# Patient Record
Sex: FEMALE | Race: WHITE | NOT HISPANIC OR LATINO | Employment: FULL TIME | URBAN - METROPOLITAN AREA
[De-identification: names, ages, dates, MRNs, and addresses within clinical notes are randomized per-mention and may not be internally consistent; named-entity substitution may affect disease eponyms.]

---

## 2017-03-07 PROBLEM — I50.22 CHRONIC SYSTOLIC CONGESTIVE HEART FAILURE: Status: ACTIVE | Noted: 2017-03-07

## 2017-03-15 ENCOUNTER — TELEPHONE (OUTPATIENT)
Dept: VASCULAR SURGERY | Facility: CLINIC | Age: 42
End: 2017-03-15

## 2017-03-15 NOTE — TELEPHONE ENCOUNTER
----- Message from Simeon MILTON Richter sent at 3/14/2017 12:01 PM CDT -----  Contact: Ingrid/Dr. Limon's office  Ingrid called in regarding the attached patient and wanted to see if Dr. Spencer would accept the attached as a referral.  Patient would be new patient to [facility] and has Medicaid.    Ingrid's call back number is [phone]

## 2017-03-24 ENCOUNTER — PATIENT MESSAGE (OUTPATIENT)
Dept: CARDIOLOGY | Facility: CLINIC | Age: 42
End: 2017-03-24

## 2020-08-08 ENCOUNTER — OFFICE VISIT (OUTPATIENT)
Dept: URGENT CARE | Facility: CLINIC | Age: 45
End: 2020-08-08
Payer: MEDICAID

## 2020-08-08 VITALS
WEIGHT: 197 LBS | OXYGEN SATURATION: 97 % | TEMPERATURE: 97 F | DIASTOLIC BLOOD PRESSURE: 90 MMHG | HEIGHT: 65 IN | SYSTOLIC BLOOD PRESSURE: 153 MMHG | BODY MASS INDEX: 32.82 KG/M2 | HEART RATE: 67 BPM

## 2020-08-08 DIAGNOSIS — R05.9 COUGH: ICD-10-CM

## 2020-08-08 DIAGNOSIS — R06.02 SHORTNESS OF BREATH: ICD-10-CM

## 2020-08-08 DIAGNOSIS — Z20.822 CLOSE EXPOSURE TO COVID-19 VIRUS: Primary | ICD-10-CM

## 2020-08-08 DIAGNOSIS — R51.9 HEAD ACHE: ICD-10-CM

## 2020-08-08 DIAGNOSIS — R51.9 INTRACTABLE HEADACHE, UNSPECIFIED CHRONICITY PATTERN, UNSPECIFIED HEADACHE TYPE: ICD-10-CM

## 2020-08-08 DIAGNOSIS — R06.02 SOB (SHORTNESS OF BREATH): ICD-10-CM

## 2020-08-08 DIAGNOSIS — J02.9 SORE THROAT: ICD-10-CM

## 2020-08-08 DIAGNOSIS — Z03.818 ENCOUNTER FOR OBSERVATION FOR SUSPECTED EXPOSURE TO OTHER BIOLOGICAL AGENTS RULED OUT: ICD-10-CM

## 2020-08-08 PROCEDURE — U0003 INFECTIOUS AGENT DETECTION BY NUCLEIC ACID (DNA OR RNA); SEVERE ACUTE RESPIRATORY SYNDROME CORONAVIRUS 2 (SARS-COV-2) (CORONAVIRUS DISEASE [COVID-19]), AMPLIFIED PROBE TECHNIQUE, MAKING USE OF HIGH THROUGHPUT TECHNOLOGIES AS DESCRIBED BY CMS-2020-01-R: HCPCS

## 2020-08-08 PROCEDURE — 99203 OFFICE O/P NEW LOW 30 MIN: CPT | Mod: S$GLB,,, | Performed by: NURSE PRACTITIONER

## 2020-08-08 PROCEDURE — 99203 PR OFFICE/OUTPT VISIT, NEW, LEVL III, 30-44 MIN: ICD-10-PCS | Mod: S$GLB,,, | Performed by: NURSE PRACTITIONER

## 2020-08-08 RX ORDER — PROMETHAZINE HYDROCHLORIDE AND DEXTROMETHORPHAN HYDROBROMIDE 6.25; 15 MG/5ML; MG/5ML
5 SYRUP ORAL NIGHTLY PRN
Qty: 118 ML | Refills: 0 | Status: SHIPPED | OUTPATIENT
Start: 2020-08-08 | End: 2020-08-18

## 2020-08-08 RX ORDER — ALBUTEROL SULFATE 90 UG/1
2 AEROSOL, METERED RESPIRATORY (INHALATION) EVERY 6 HOURS PRN
Qty: 18 G | Refills: 0 | Status: SHIPPED | OUTPATIENT
Start: 2020-08-08 | End: 2022-03-18

## 2020-08-08 RX ORDER — FLUTICASONE PROPIONATE 50 MCG
1 SPRAY, SUSPENSION (ML) NASAL DAILY
Qty: 16 G | Refills: 0 | Status: ON HOLD | OUTPATIENT
Start: 2020-08-08 | End: 2022-03-18 | Stop reason: HOSPADM

## 2020-08-08 NOTE — PATIENT INSTRUCTIONS
Follow up with your doctor in a few days.  Return to the urgent care or go to the ER if symptoms get worse.    flonase daily.  Daily antihistamine (claritin, allergra, or zyrtec)  Hot steam showers, vicks vapor rub, hot tea.  Albuterol every 4-6 hours for shortness of breath.    Promethazine DM for cough- will make you drowsy.     Follow up with primary care this week.  Self quarantine until test results back and follow cdc guidelines    We will call with results or they will be released to your patient portal.

## 2020-08-08 NOTE — PROGRESS NOTES
"Subjective:       Patient ID: Samraia Verma is a 44 y.o. female.    Vitals:  height is 5' 5" (1.651 m) and weight is 89.4 kg (197 lb). Her temperature is 97.2 °F (36.2 °C). Her blood pressure is 153/90 (abnormal) and her pulse is 67. Her oxygen saturation is 97%.     Chief Complaint: Sore Throat    Patient presents to clinic today for sore throat, headache, shortness of breath that started on Tuesday.(5 days) Patient's son tested positive on Tuesday for COVID.   Smh: htn, dm2, chf.  started with symptoms 1-2 days ago. No hx of asthma, chronic lung disease.     Other  This is a new problem. The current episode started in the past 7 days. The problem occurs constantly. The problem has been unchanged. Associated symptoms include congestion, coughing, fatigue, headaches and a sore throat. Pertinent negatives include no arthralgias, chest pain, chills, fever, joint swelling, myalgias, nausea, rash, vertigo, vomiting or weakness. She has tried nothing for the symptoms. The treatment provided no relief.       Constitution: Positive for fatigue. Negative for chills and fever.   HENT: Positive for congestion and sore throat.    Neck: Negative for painful lymph nodes.   Cardiovascular: Negative for chest pain and leg swelling.   Eyes: Negative for double vision and blurred vision.   Respiratory: Positive for cough. Negative for shortness of breath.    Gastrointestinal: Negative for nausea, vomiting and diarrhea.   Genitourinary: Negative for dysuria, frequency, urgency and history of kidney stones.   Musculoskeletal: Negative for joint pain, joint swelling, muscle cramps and muscle ache.   Skin: Negative for color change, pale, rash and bruising.   Allergic/Immunologic: Negative for seasonal allergies.   Neurological: Positive for headaches. Negative for dizziness, history of vertigo, light-headedness and passing out.   Hematologic/Lymphatic: Negative for swollen lymph nodes.   Psychiatric/Behavioral: Negative for " nervous/anxious, sleep disturbance and depression. The patient is not nervous/anxious.        Objective:      Physical Exam   Constitutional:  Non-toxic appearance. She does not appear ill. No distress.   HENT:   Mouth/Throat: Posterior oropharyngeal erythema present. No oropharyngeal exudate or posterior oropharyngeal edema.   Pulmonary/Chest: No stridor. No respiratory distress. She has decreased breath sounds (decreased air movement). She has no wheezes. She has no rhonchi.   Dry cough with deep breath    Comments: Dry cough with deep breath    Skin: Skin is not diaphoretic. Psychiatric: Her speech is normal and behavior is normal. Mood normal.       Patient was seen limited PE due to state of emergency for the COVID-19 outbreak.    Assessment:       1. Close Exposure to Covid-19 Virus    2. SOB (shortness of breath)    3. Intractable headache, unspecified chronicity pattern, unspecified headache type    4. Cough    5. Sore throat    6. Shortness of breath    7. Head ache    8. Encounter for observation for suspected exposure to other biological agents ruled out        Plan:     discussed meets criteria for covid 19 testing- . Advised to self quarantine until results and based on cdc guidelines if symptomatic.  Close follow up with pcp  Antihistamine, flonase, albuterol, rest/hydrate.     Close Exposure to Covid-19 Virus    SOB (shortness of breath)  -     albuterol (PROVENTIL/VENTOLIN HFA) 90 mcg/actuation inhaler; Inhale 2 puffs into the lungs every 6 (six) hours as needed. Rescue  Dispense: 18 g; Refill: 0    Intractable headache, unspecified chronicity pattern, unspecified headache type    Cough  -     albuterol (PROVENTIL/VENTOLIN HFA) 90 mcg/actuation inhaler; Inhale 2 puffs into the lungs every 6 (six) hours as needed. Rescue  Dispense: 18 g; Refill: 0  -     promethazine-dextromethorphan (PROMETHAZINE-DM) 6.25-15 mg/5 mL Syrp; Take 5 mLs by mouth nightly as needed.  Dispense: 118 mL; Refill: 0  -      fluticasone propionate (FLONASE) 50 mcg/actuation nasal spray; 1 spray (50 mcg total) by Each Nostril route once daily.  Dispense: 16 g; Refill: 0    Sore throat  -     fluticasone propionate (FLONASE) 50 mcg/actuation nasal spray; 1 spray (50 mcg total) by Each Nostril route once daily.  Dispense: 16 g; Refill: 0    Shortness of breath  -     COVID-19 Routine Screening    Head ache  -     COVID-19 Routine Screening    Encounter for observation for suspected exposure to other biological agents ruled out  -     COVID-19 Routine Screening      Patient Instructions   Follow up with your doctor in a few days.  Return to the urgent care or go to the ER if symptoms get worse.    flonase daily.  Daily antihistamine (claritin, allergra, or zyrtec)  Hot steam showers, vicks vapor rub, hot tea.  Albuterol every 4-6 hours for shortness of breath.    Promethazine DM for cough- will make you drowsy.     Follow up with primary care this week.  Self quarantine until test results back and follow cdc guidelines    We will call with results or they will be released to your patient portal.

## 2020-08-09 ENCOUNTER — TELEPHONE (OUTPATIENT)
Dept: URGENT CARE | Facility: CLINIC | Age: 45
End: 2020-08-09

## 2020-08-09 LAB — SARS-COV-2 RNA RESP QL NAA+PROBE: DETECTED

## 2020-08-09 NOTE — TELEPHONE ENCOUNTER
covid 19 detected. Reviewed results. Discussed cdc guidelines/dept of state recommendations/guidelines.  Enrolled in home monitoring program.

## 2021-04-15 ENCOUNTER — OFFICE VISIT (OUTPATIENT)
Dept: URGENT CARE | Facility: CLINIC | Age: 46
End: 2021-04-15
Payer: MEDICAID

## 2021-04-15 VITALS
TEMPERATURE: 98 F | WEIGHT: 197 LBS | BODY MASS INDEX: 32.78 KG/M2 | SYSTOLIC BLOOD PRESSURE: 125 MMHG | RESPIRATION RATE: 16 BRPM | OXYGEN SATURATION: 98 % | DIASTOLIC BLOOD PRESSURE: 70 MMHG | HEART RATE: 84 BPM

## 2021-04-15 DIAGNOSIS — J01.90 ACUTE SINUSITIS, RECURRENCE NOT SPECIFIED, UNSPECIFIED LOCATION: Primary | ICD-10-CM

## 2021-04-15 DIAGNOSIS — R05.9 COUGH: ICD-10-CM

## 2021-04-15 LAB
CTP QC/QA: YES
SARS-COV-2 RDRP RESP QL NAA+PROBE: NEGATIVE

## 2021-04-15 PROCEDURE — U0002: ICD-10-PCS | Mod: QW,S$GLB,, | Performed by: PHYSICIAN ASSISTANT

## 2021-04-15 PROCEDURE — 99214 PR OFFICE/OUTPT VISIT, EST, LEVL IV, 30-39 MIN: ICD-10-PCS | Mod: S$GLB,,, | Performed by: PHYSICIAN ASSISTANT

## 2021-04-15 PROCEDURE — 99214 OFFICE O/P EST MOD 30 MIN: CPT | Mod: S$GLB,,, | Performed by: PHYSICIAN ASSISTANT

## 2021-04-15 PROCEDURE — U0002 COVID-19 LAB TEST NON-CDC: HCPCS | Mod: QW,S$GLB,, | Performed by: PHYSICIAN ASSISTANT

## 2021-04-15 RX ORDER — AMOXICILLIN AND CLAVULANATE POTASSIUM 875; 125 MG/1; MG/1
1 TABLET, FILM COATED ORAL 2 TIMES DAILY
Qty: 20 TABLET | Refills: 0 | Status: SHIPPED | OUTPATIENT
Start: 2021-04-15 | End: 2021-04-25

## 2021-04-15 RX ORDER — ONDANSETRON 4 MG/1
4 TABLET, ORALLY DISINTEGRATING ORAL EVERY 8 HOURS PRN
Qty: 21 TABLET | Refills: 0 | Status: SHIPPED | OUTPATIENT
Start: 2021-04-15 | End: 2021-04-22

## 2021-04-15 RX ORDER — AZELASTINE 1 MG/ML
1 SPRAY, METERED NASAL 2 TIMES DAILY
Qty: 30 ML | Refills: 0 | Status: SHIPPED | OUTPATIENT
Start: 2021-04-15 | End: 2022-03-18

## 2021-04-15 RX ORDER — PROMETHAZINE HYDROCHLORIDE AND DEXTROMETHORPHAN HYDROBROMIDE 6.25; 15 MG/5ML; MG/5ML
5 SYRUP ORAL
Qty: 180 ML | Refills: 0 | Status: SHIPPED | OUTPATIENT
Start: 2021-04-15 | End: 2021-04-22

## 2021-04-15 RX ORDER — BENZONATATE 100 MG/1
100 CAPSULE ORAL EVERY 6 HOURS PRN
Qty: 28 CAPSULE | Refills: 0 | Status: SHIPPED | OUTPATIENT
Start: 2021-04-15 | End: 2021-04-22

## 2021-04-15 RX ORDER — FLUTICASONE PROPIONATE 50 MCG
1 SPRAY, SUSPENSION (ML) NASAL DAILY
Qty: 16 G | Refills: 0 | Status: SHIPPED | OUTPATIENT
Start: 2021-04-15 | End: 2022-03-28

## 2021-05-06 ENCOUNTER — PATIENT MESSAGE (OUTPATIENT)
Dept: RESEARCH | Facility: HOSPITAL | Age: 46
End: 2021-05-06

## 2022-03-15 ENCOUNTER — HOSPITAL ENCOUNTER (INPATIENT)
Facility: HOSPITAL | Age: 47
LOS: 2 days | Discharge: HOME | DRG: 638 | End: 2022-03-18
Attending: EMERGENCY MEDICINE | Admitting: STUDENT IN AN ORGANIZED HEALTH CARE EDUCATION/TRAINING PROGRAM
Payer: MEDICARE

## 2022-03-15 DIAGNOSIS — R73.9 HYPERGLYCEMIA: Primary | ICD-10-CM

## 2022-03-15 DIAGNOSIS — R07.89 CHEST DISCOMFORT: ICD-10-CM

## 2022-03-15 DIAGNOSIS — R20.0 NUMBNESS AND TINGLING OF LEFT SIDE OF FACE: ICD-10-CM

## 2022-03-15 DIAGNOSIS — I50.22 CHRONIC SYSTOLIC CONGESTIVE HEART FAILURE: ICD-10-CM

## 2022-03-15 DIAGNOSIS — E11.69 DIABETES MELLITUS TYPE 2 IN OBESE: ICD-10-CM

## 2022-03-15 DIAGNOSIS — R07.9 CHEST PAIN: ICD-10-CM

## 2022-03-15 DIAGNOSIS — R20.2 PARESTHESIA OF LEFT ARM AND LEG: ICD-10-CM

## 2022-03-15 DIAGNOSIS — I42.8 NICM (NONISCHEMIC CARDIOMYOPATHY): ICD-10-CM

## 2022-03-15 DIAGNOSIS — H53.8 BLURRY VISION, LEFT EYE: ICD-10-CM

## 2022-03-15 DIAGNOSIS — I50.9 CHF (CONGESTIVE HEART FAILURE): ICD-10-CM

## 2022-03-15 DIAGNOSIS — H53.2 DIPLOPIA: ICD-10-CM

## 2022-03-15 DIAGNOSIS — R20.2 NUMBNESS AND TINGLING OF LEFT SIDE OF FACE: ICD-10-CM

## 2022-03-15 DIAGNOSIS — E11.00 HYPEROSMOLAR HYPERGLYCEMIC STATE (HHS): ICD-10-CM

## 2022-03-15 DIAGNOSIS — E66.9 DIABETES MELLITUS TYPE 2 IN OBESE: ICD-10-CM

## 2022-03-15 PROBLEM — E78.2 MIXED HYPERLIPIDEMIA: Status: ACTIVE | Noted: 2022-03-15

## 2022-03-15 PROBLEM — E87.1 HYPONATREMIA: Status: ACTIVE | Noted: 2022-03-15

## 2022-03-15 PROBLEM — E66.811 CLASS 1 OBESITY IN ADULT: Status: ACTIVE | Noted: 2022-03-15

## 2022-03-15 LAB
ALLENS TEST: ABNORMAL
ANION GAP SERPL CALC-SCNC: 15 MMOL/L (ref 8–16)
ANION GAP SERPL CALC-SCNC: 18 MMOL/L (ref 8–16)
B-OH-BUTYR BLD STRIP-SCNC: 0 MMOL/L (ref 0–0.5)
BACTERIA #/AREA URNS AUTO: NORMAL /HPF
BASOPHILS # BLD AUTO: 0.03 K/UL (ref 0–0.2)
BASOPHILS NFR BLD: 0.3 % (ref 0–1.9)
BILIRUB UR QL STRIP: NEGATIVE
BNP SERPL-MCNC: <10 PG/ML (ref 0–99)
BUN SERPL-MCNC: 6 MG/DL (ref 6–30)
BUN SERPL-MCNC: 6 MG/DL (ref 6–30)
BUN SERPL-MCNC: 7 MG/DL (ref 6–20)
BUN SERPL-MCNC: 7 MG/DL (ref 6–20)
CALCIUM SERPL-MCNC: 10 MG/DL (ref 8.7–10.5)
CALCIUM SERPL-MCNC: 9.9 MG/DL (ref 8.7–10.5)
CHLORIDE SERPL-SCNC: 93 MMOL/L (ref 95–110)
CHLORIDE SERPL-SCNC: 95 MMOL/L (ref 95–110)
CHLORIDE SERPL-SCNC: 96 MMOL/L (ref 95–110)
CHLORIDE SERPL-SCNC: 98 MMOL/L (ref 95–110)
CLARITY UR REFRACT.AUTO: CLEAR
CO2 SERPL-SCNC: 22 MMOL/L (ref 23–29)
CO2 SERPL-SCNC: 22 MMOL/L (ref 23–29)
COLOR UR AUTO: COLORLESS
CREAT SERPL-MCNC: 0.5 MG/DL (ref 0.5–1.4)
CREAT SERPL-MCNC: 0.5 MG/DL (ref 0.5–1.4)
CREAT SERPL-MCNC: 0.6 MG/DL (ref 0.5–1.4)
CREAT SERPL-MCNC: 0.7 MG/DL (ref 0.5–1.4)
CREAT SERPL-MCNC: 0.8 MG/DL (ref 0.5–1.4)
CTP QC/QA: YES
DELSYS: ABNORMAL
DIFFERENTIAL METHOD: ABNORMAL
EOSINOPHIL # BLD AUTO: 0.1 K/UL (ref 0–0.5)
EOSINOPHIL NFR BLD: 1.3 % (ref 0–8)
ERYTHROCYTE [DISTWIDTH] IN BLOOD BY AUTOMATED COUNT: 14.1 % (ref 11.5–14.5)
EST. GFR  (AFRICAN AMERICAN): >60 ML/MIN/1.73 M^2
EST. GFR  (AFRICAN AMERICAN): >60 ML/MIN/1.73 M^2
EST. GFR  (NON AFRICAN AMERICAN): >60 ML/MIN/1.73 M^2
EST. GFR  (NON AFRICAN AMERICAN): >60 ML/MIN/1.73 M^2
ESTIMATED AVG GLUCOSE: ABNORMAL MG/DL (ref 68–131)
GLUCOSE SERPL-MCNC: 315 MG/DL (ref 70–110)
GLUCOSE SERPL-MCNC: 460 MG/DL (ref 70–110)
GLUCOSE SERPL-MCNC: 508 MG/DL (ref 70–110)
GLUCOSE SERPL-MCNC: 646 MG/DL (ref 70–110)
GLUCOSE UR QL STRIP: ABNORMAL
HBA1C MFR BLD: >14 % (ref 4–5.6)
HCO3 UR-SCNC: 27.8 MMOL/L (ref 24–28)
HCT VFR BLD AUTO: 42.3 % (ref 37–48.5)
HCT VFR BLD CALC: 42 %PCV (ref 36–54)
HCT VFR BLD CALC: 44 %PCV (ref 36–54)
HGB BLD-MCNC: 12.7 G/DL (ref 12–16)
HGB UR QL STRIP: NEGATIVE
IMM GRANULOCYTES # BLD AUTO: 0.05 K/UL (ref 0–0.04)
IMM GRANULOCYTES NFR BLD AUTO: 0.5 % (ref 0–0.5)
KETONES UR QL STRIP: NEGATIVE
LEUKOCYTE ESTERASE UR QL STRIP: ABNORMAL
LYMPHOCYTES # BLD AUTO: 3.3 K/UL (ref 1–4.8)
LYMPHOCYTES NFR BLD: 31.8 % (ref 18–48)
MAGNESIUM SERPL-MCNC: 1.7 MG/DL (ref 1.6–2.6)
MCH RBC QN AUTO: 25.2 PG (ref 27–31)
MCHC RBC AUTO-ENTMCNC: 30 G/DL (ref 32–36)
MCV RBC AUTO: 84 FL (ref 82–98)
MICROSCOPIC COMMENT: NORMAL
MODE: ABNORMAL
MONOCYTES # BLD AUTO: 0.5 K/UL (ref 0.3–1)
MONOCYTES NFR BLD: 4.7 % (ref 4–15)
NEUTROPHILS # BLD AUTO: 6.4 K/UL (ref 1.8–7.7)
NEUTROPHILS NFR BLD: 61.4 % (ref 38–73)
NITRITE UR QL STRIP: NEGATIVE
NRBC BLD-RTO: 0 /100 WBC
OSMOLALITY SERPL: 312 MOSM/KG (ref 275–295)
PCO2 BLDA: 45.8 MMHG (ref 35–45)
PH SMN: 7.39 [PH] (ref 7.35–7.45)
PH UR STRIP: 6 [PH] (ref 5–8)
PHOSPHATE SERPL-MCNC: 3.2 MG/DL (ref 2.7–4.5)
PHOSPHATE SERPL-MCNC: 3.5 MG/DL (ref 2.7–4.5)
PLATELET # BLD AUTO: 287 K/UL (ref 150–450)
PMV BLD AUTO: 10.5 FL (ref 9.2–12.9)
PO2 BLDA: 37 MMHG (ref 40–60)
POC BE: 3 MMOL/L
POC IONIZED CALCIUM: 1.19 MMOL/L (ref 1.06–1.42)
POC IONIZED CALCIUM: 1.21 MMOL/L (ref 1.06–1.42)
POC PTINR: 1 (ref 0.9–1.2)
POC PTWBT: 12.1 SEC (ref 9.7–14.3)
POC SATURATED O2: 70 % (ref 95–100)
POC TCO2 (MEASURED): 22 MMOL/L (ref 23–29)
POC TCO2 (MEASURED): 26 MMOL/L (ref 23–29)
POC TCO2: 29 MMOL/L (ref 24–29)
POCT GLUCOSE: 283 MG/DL (ref 70–110)
POCT GLUCOSE: 288 MG/DL (ref 70–110)
POCT GLUCOSE: 301 MG/DL (ref 70–110)
POCT GLUCOSE: 314 MG/DL (ref 70–110)
POCT GLUCOSE: >500 MG/DL (ref 70–110)
POTASSIUM BLD-SCNC: 4.4 MMOL/L (ref 3.5–5.1)
POTASSIUM BLD-SCNC: 4.6 MMOL/L (ref 3.5–5.1)
POTASSIUM SERPL-SCNC: 3.9 MMOL/L (ref 3.5–5.1)
POTASSIUM SERPL-SCNC: 4.3 MMOL/L (ref 3.5–5.1)
PROT UR QL STRIP: NEGATIVE
RBC # BLD AUTO: 5.04 M/UL (ref 4–5.4)
RBC #/AREA URNS AUTO: 2 /HPF (ref 0–4)
SAMPLE: ABNORMAL
SAMPLE: NORMAL
SAMPLE: NORMAL
SARS-COV-2 RDRP RESP QL NAA+PROBE: NEGATIVE
SITE: ABNORMAL
SODIUM BLD-SCNC: 131 MMOL/L (ref 136–145)
SODIUM BLD-SCNC: 133 MMOL/L (ref 136–145)
SODIUM SERPL-SCNC: 133 MMOL/L (ref 136–145)
SODIUM SERPL-SCNC: 135 MMOL/L (ref 136–145)
SP GR UR STRIP: >=1.03 (ref 1–1.03)
TROPONIN I SERPL DL<=0.01 NG/ML-MCNC: <0.006 NG/ML (ref 0–0.03)
URN SPEC COLLECT METH UR: ABNORMAL
WBC # BLD AUTO: 10.47 K/UL (ref 3.9–12.7)
WBC #/AREA URNS AUTO: 1 /HPF (ref 0–5)
YEAST UR QL AUTO: NORMAL

## 2022-03-15 PROCEDURE — 25000003 PHARM REV CODE 250

## 2022-03-15 PROCEDURE — 80048 BASIC METABOLIC PNL TOTAL CA: CPT

## 2022-03-15 PROCEDURE — 99220 PR INITIAL OBSERVATION CARE,LEVL III: CPT | Mod: ,,, | Performed by: FAMILY MEDICINE

## 2022-03-15 PROCEDURE — 93010 ELECTROCARDIOGRAM REPORT: CPT | Mod: 59,76,, | Performed by: INTERNAL MEDICINE

## 2022-03-15 PROCEDURE — 82565 ASSAY OF CREATININE: CPT

## 2022-03-15 PROCEDURE — 87389 HIV-1 AG W/HIV-1&-2 AB AG IA: CPT | Performed by: EMERGENCY MEDICINE

## 2022-03-15 PROCEDURE — 96366 THER/PROPH/DIAG IV INF ADDON: CPT

## 2022-03-15 PROCEDURE — 84484 ASSAY OF TROPONIN QUANT: CPT

## 2022-03-15 PROCEDURE — 25500020 PHARM REV CODE 255

## 2022-03-15 PROCEDURE — 93005 ELECTROCARDIOGRAM TRACING: CPT

## 2022-03-15 PROCEDURE — 99220 PR INITIAL OBSERVATION CARE,LEVL III: ICD-10-PCS | Mod: ,,, | Performed by: FAMILY MEDICINE

## 2022-03-15 PROCEDURE — 85610 PROTHROMBIN TIME: CPT

## 2022-03-15 PROCEDURE — 83036 HEMOGLOBIN GLYCOSYLATED A1C: CPT

## 2022-03-15 PROCEDURE — 93010 ELECTROCARDIOGRAM REPORT: CPT | Mod: ,,, | Performed by: INTERNAL MEDICINE

## 2022-03-15 PROCEDURE — 81001 URINALYSIS AUTO W/SCOPE: CPT

## 2022-03-15 PROCEDURE — 96365 THER/PROPH/DIAG IV INF INIT: CPT

## 2022-03-15 PROCEDURE — 93308 TTE F-UP OR LMTD: CPT | Mod: 26,,, | Performed by: EMERGENCY MEDICINE

## 2022-03-15 PROCEDURE — 63600175 PHARM REV CODE 636 W HCPCS: Performed by: FAMILY MEDICINE

## 2022-03-15 PROCEDURE — 96372 THER/PROPH/DIAG INJ SC/IM: CPT | Performed by: FAMILY MEDICINE

## 2022-03-15 PROCEDURE — G0378 HOSPITAL OBSERVATION PER HR: HCPCS

## 2022-03-15 PROCEDURE — 82010 KETONE BODYS QUAN: CPT

## 2022-03-15 PROCEDURE — 83735 ASSAY OF MAGNESIUM: CPT

## 2022-03-15 PROCEDURE — 99291 PR CRITICAL CARE, E/M 30-74 MINUTES: ICD-10-PCS | Mod: 25,CS,, | Performed by: EMERGENCY MEDICINE

## 2022-03-15 PROCEDURE — 85025 COMPLETE CBC W/AUTO DIFF WBC: CPT

## 2022-03-15 PROCEDURE — 86803 HEPATITIS C AB TEST: CPT | Performed by: EMERGENCY MEDICINE

## 2022-03-15 PROCEDURE — 82803 BLOOD GASES ANY COMBINATION: CPT

## 2022-03-15 PROCEDURE — 80047 BASIC METABLC PNL IONIZED CA: CPT

## 2022-03-15 PROCEDURE — 99900035 HC TECH TIME PER 15 MIN (STAT)

## 2022-03-15 PROCEDURE — 84100 ASSAY OF PHOSPHORUS: CPT | Mod: 91

## 2022-03-15 PROCEDURE — 83880 ASSAY OF NATRIURETIC PEPTIDE: CPT

## 2022-03-15 PROCEDURE — 82962 GLUCOSE BLOOD TEST: CPT

## 2022-03-15 PROCEDURE — 83930 ASSAY OF BLOOD OSMOLALITY: CPT

## 2022-03-15 PROCEDURE — 99291 CRITICAL CARE FIRST HOUR: CPT | Mod: 25,CS,, | Performed by: EMERGENCY MEDICINE

## 2022-03-15 PROCEDURE — 99223 PR INITIAL HOSPITAL CARE,LEVL III: ICD-10-PCS | Mod: ,,, | Performed by: PSYCHIATRY & NEUROLOGY

## 2022-03-15 PROCEDURE — 25000003 PHARM REV CODE 250: Performed by: FAMILY MEDICINE

## 2022-03-15 PROCEDURE — 93308 PR ECHO HEART XTHORACIC,LIMITED: ICD-10-PCS | Mod: 26,,, | Performed by: EMERGENCY MEDICINE

## 2022-03-15 PROCEDURE — U0002 COVID-19 LAB TEST NON-CDC: HCPCS | Performed by: EMERGENCY MEDICINE

## 2022-03-15 PROCEDURE — 99291 CRITICAL CARE FIRST HOUR: CPT | Mod: 25

## 2022-03-15 PROCEDURE — 99223 1ST HOSP IP/OBS HIGH 75: CPT | Mod: ,,, | Performed by: PSYCHIATRY & NEUROLOGY

## 2022-03-15 PROCEDURE — 93010 EKG 12-LEAD: ICD-10-PCS | Mod: ,,, | Performed by: INTERNAL MEDICINE

## 2022-03-15 RX ORDER — GLUCAGON 1 MG
1 KIT INJECTION
Status: DISCONTINUED | OUTPATIENT
Start: 2022-03-15 | End: 2022-03-18 | Stop reason: HOSPADM

## 2022-03-15 RX ORDER — NALOXONE HCL 0.4 MG/ML
0.02 VIAL (ML) INJECTION
Status: DISCONTINUED | OUTPATIENT
Start: 2022-03-15 | End: 2022-03-18 | Stop reason: HOSPADM

## 2022-03-15 RX ORDER — HEPARIN SODIUM 5000 [USP'U]/ML
5000 INJECTION, SOLUTION INTRAVENOUS; SUBCUTANEOUS EVERY 8 HOURS
Status: DISCONTINUED | OUTPATIENT
Start: 2022-03-15 | End: 2022-03-18 | Stop reason: HOSPADM

## 2022-03-15 RX ORDER — ACETAMINOPHEN 500 MG
1000 TABLET ORAL EVERY 8 HOURS PRN
Status: DISCONTINUED | OUTPATIENT
Start: 2022-03-15 | End: 2022-03-18 | Stop reason: HOSPADM

## 2022-03-15 RX ORDER — MAG HYDROX/ALUMINUM HYD/SIMETH 200-200-20
30 SUSPENSION, ORAL (FINAL DOSE FORM) ORAL 4 TIMES DAILY PRN
Status: DISCONTINUED | OUTPATIENT
Start: 2022-03-15 | End: 2022-03-18 | Stop reason: HOSPADM

## 2022-03-15 RX ORDER — SODIUM CHLORIDE 9 MG/ML
INJECTION, SOLUTION INTRAVENOUS CONTINUOUS
Status: DISCONTINUED | OUTPATIENT
Start: 2022-03-15 | End: 2022-03-15

## 2022-03-15 RX ORDER — SODIUM CHLORIDE 0.9 % (FLUSH) 0.9 %
10 SYRINGE (ML) INJECTION EVERY 12 HOURS PRN
Status: DISCONTINUED | OUTPATIENT
Start: 2022-03-15 | End: 2022-03-18 | Stop reason: HOSPADM

## 2022-03-15 RX ORDER — IBUPROFEN 200 MG
16 TABLET ORAL
Status: DISCONTINUED | OUTPATIENT
Start: 2022-03-15 | End: 2022-03-18 | Stop reason: HOSPADM

## 2022-03-15 RX ORDER — ATORVASTATIN CALCIUM 20 MG/1
40 TABLET, FILM COATED ORAL DAILY
Status: DISCONTINUED | OUTPATIENT
Start: 2022-03-16 | End: 2022-03-18 | Stop reason: HOSPADM

## 2022-03-15 RX ORDER — POLYETHYLENE GLYCOL 3350 17 G/17G
17 POWDER, FOR SOLUTION ORAL DAILY
Status: DISCONTINUED | OUTPATIENT
Start: 2022-03-16 | End: 2022-03-18 | Stop reason: HOSPADM

## 2022-03-15 RX ORDER — SODIUM CHLORIDE 9 MG/ML
INJECTION, SOLUTION INTRAVENOUS CONTINUOUS
Status: ACTIVE | OUTPATIENT
Start: 2022-03-15 | End: 2022-03-16

## 2022-03-15 RX ORDER — IPRATROPIUM BROMIDE AND ALBUTEROL SULFATE 2.5; .5 MG/3ML; MG/3ML
3 SOLUTION RESPIRATORY (INHALATION) EVERY 4 HOURS PRN
Status: DISCONTINUED | OUTPATIENT
Start: 2022-03-15 | End: 2022-03-18 | Stop reason: HOSPADM

## 2022-03-15 RX ORDER — HYDRALAZINE HYDROCHLORIDE 25 MG/1
25 TABLET, FILM COATED ORAL EVERY 8 HOURS PRN
Status: DISCONTINUED | OUTPATIENT
Start: 2022-03-15 | End: 2022-03-18 | Stop reason: HOSPADM

## 2022-03-15 RX ORDER — ONDANSETRON 2 MG/ML
4 INJECTION INTRAMUSCULAR; INTRAVENOUS EVERY 8 HOURS PRN
Status: DISCONTINUED | OUTPATIENT
Start: 2022-03-15 | End: 2022-03-18 | Stop reason: HOSPADM

## 2022-03-15 RX ORDER — CARVEDILOL 6.25 MG/1
6.25 TABLET ORAL 2 TIMES DAILY
Status: DISCONTINUED | OUTPATIENT
Start: 2022-03-15 | End: 2022-03-18 | Stop reason: HOSPADM

## 2022-03-15 RX ORDER — TALC
6 POWDER (GRAM) TOPICAL NIGHTLY PRN
Status: DISCONTINUED | OUTPATIENT
Start: 2022-03-15 | End: 2022-03-18 | Stop reason: HOSPADM

## 2022-03-15 RX ORDER — IBUPROFEN 200 MG
24 TABLET ORAL
Status: DISCONTINUED | OUTPATIENT
Start: 2022-03-15 | End: 2022-03-18 | Stop reason: HOSPADM

## 2022-03-15 RX ORDER — ASPIRIN 81 MG/1
81 TABLET ORAL DAILY
Status: DISCONTINUED | OUTPATIENT
Start: 2022-03-16 | End: 2022-03-18 | Stop reason: HOSPADM

## 2022-03-15 RX ADMIN — SODIUM CHLORIDE: 0.9 INJECTION, SOLUTION INTRAVENOUS at 09:03

## 2022-03-15 RX ADMIN — SODIUM CHLORIDE 250 ML: 0.9 INJECTION, SOLUTION INTRAVENOUS at 06:03

## 2022-03-15 RX ADMIN — IOHEXOL 100 ML: 350 INJECTION, SOLUTION INTRAVENOUS at 05:03

## 2022-03-15 RX ADMIN — SODIUM CHLORIDE: 0.9 INJECTION, SOLUTION INTRAVENOUS at 08:03

## 2022-03-15 RX ADMIN — HEPARIN SODIUM 5000 UNITS: 5000 INJECTION INTRAVENOUS; SUBCUTANEOUS at 10:03

## 2022-03-15 RX ADMIN — CARVEDILOL 6.25 MG: 6.25 TABLET, FILM COATED ORAL at 08:03

## 2022-03-15 RX ADMIN — INSULIN HUMAN 3 UNITS/HR: 1 INJECTION, SOLUTION INTRAVENOUS at 06:03

## 2022-03-15 NOTE — HPI
Ms. Verma is a 46 year old female with PMH of CHF (AICD), LBBB, cardiomyopathy, DM, and HTN who presented with c/o chest pain, blurry vision, left sided numbness, and double vision that started at 1530. Patient reports she was at home laying in bed when symptoms started. However reports blurry vision is not a new problem and that has been going on for a while. She has never seen anyone for this issue. She reports that she was unable to take home medications due to financial hardship and did not have a PCP. Stroke code was activated. On exam, patient with functional exam. Patient reported that she could not move left arm however on distraction patient moved hand was able to maintain arm above her head with out dropping it. Noted increased tone on the left arm, mild left facial weakness, and fluctuating visual fields defect. Patient was noted to be hyperglycemic with blood glucose >600 and hyponatremic.CTA MP negative for LVO, high grade stenosis, vascular malformation, and hemorrhage. Patient has a pacemaker in which she was told it was not MRI compatible.     Patient is not a candidate for TPA as evidence of stroke mimic with blood glucose >600 and functional exam.     Recommend repeat CTH in 24 hours to rule out stroke if patient is not able to get MRI.

## 2022-03-15 NOTE — ED PROVIDER NOTES
Encounter Date: 3/15/2022       History     Chief Complaint   Patient presents with    Chest Pain     Has aicd     Ms. Verma is a 46 year old female with history of DM2, HTN, CHF with IACD who presents with progressively worsening chest pain. Substernal in location and radiates to the right subcostal area. Chest pain is a 6/10 during exam. CP worsens with activity. She also notes DAVIS, SOB, chills, headache and weakness. Patient noted to have walking difficulty with double vision since arriving to Emergency Room. She reports chronic diarrhea for the past two years. She notes not having been taking medications due to financial difficulties and lack of PCP for over a year and half. She denies fever, n/v/c.        Review of patient's allergies indicates:   Allergen Reactions    Lisinopril Rash     Past Medical History:   Diagnosis Date    Anticoagulant long-term use     CHF (congestive heart failure)     Chronic systolic congestive heart failure 3/7/2017    Diabetes mellitus     Diabetes mellitus type 2 in obese 1/6/2016    Hypertension     Primary hypertension 1/6/2016     Past Surgical History:   Procedure Laterality Date    BREAST SURGERY Bilateral     reduction    CARDIAC DEFIBRILLATOR PLACEMENT  2018    INSERTION OF PACEMAKER  2018    TUBAL LIGATION       Family History   Problem Relation Age of Onset    Heart disease Mother     Arthritis Mother     Heart disease Father     Hypertension Father     Asthma Son     Heart disease Paternal Grandmother     Pulmonary embolism Unknown      Social History     Tobacco Use    Smoking status: Never Smoker    Smokeless tobacco: Never Used   Substance Use Topics    Alcohol use: No    Drug use: No     Review of Systems   Constitutional: Positive for chills. Negative for fever.   HENT: Negative for congestion and ear pain.    Eyes: Positive for visual disturbance (left eye blurry, double vision). Negative for pain.   Respiratory: Positive for shortness of  breath.    Cardiovascular: Positive for chest pain. Negative for palpitations.   Gastrointestinal: Positive for diarrhea. Negative for abdominal pain, constipation, nausea and vomiting.   Endocrine: Positive for polyuria.   Genitourinary: Positive for urgency. Negative for difficulty urinating, dysuria and hematuria.   Musculoskeletal: Negative for back pain and neck pain.   Skin: Negative for rash and wound.   Neurological: Positive for dizziness and headaches.   Psychiatric/Behavioral: Positive for sleep disturbance. Negative for agitation.       Physical Exam     Initial Vitals [03/15/22 1551]   BP Pulse Resp Temp SpO2   (!) 172/81 100 20 99.3 °F (37.4 °C) 97 %      MAP       --         Physical Exam    Nursing note and vitals reviewed.  Constitutional: She appears well-developed and well-nourished. She is diaphoretic. She appears distressed.   HENT:   Head: Normocephalic and atraumatic.   Right Ear: External ear normal.   Left Ear: External ear normal.   Eyes: EOM are normal. Pupils are equal, round, and reactive to light. Right eye exhibits no discharge. Left eye exhibits no discharge.   Neck: Neck supple. No JVD present.   Normal range of motion.  Cardiovascular: Normal rate and regular rhythm.   No murmur heard.  Pulmonary/Chest: Breath sounds normal. She has no wheezes.   Abdominal: Abdomen is soft. Bowel sounds are normal.   Musculoskeletal:         General: No tenderness or edema. Normal range of motion.      Cervical back: Normal range of motion and neck supple.     Neurological: She is alert and oriented to person, place, and time. She displays no atrophy. A cranial nerve deficit and sensory deficit is present. Coordination abnormal.   Reflex Scores:       Bicep reflexes are 2+ on the right side and 2+ on the left side.       Brachioradialis reflexes are 2+ on the right side and 2+ on the left side.       Patellar reflexes are 3+ on the right side and 3+ on the left side.  Left sided ataxia, Left facial  paresthesia, Left eye vision blurriness and double vision noted when both eyes open   Skin: Skin is warm. No rash noted.   Psychiatric: She has a normal mood and affect. Her behavior is normal. Judgment and thought content normal.         ED Course   Procedures  Labs Reviewed   CBC W/ AUTO DIFFERENTIAL - Abnormal; Notable for the following components:       Result Value    MCH 25.2 (*)     MCHC 30.0 (*)     Immature Grans (Abs) 0.05 (*)     All other components within normal limits   URINALYSIS, REFLEX TO URINE CULTURE - Abnormal; Notable for the following components:    Color, UA Colorless (*)     Specific Gravity, UA >=1.030 (*)     Glucose, UA 3+ (*)     Leukocytes, UA Trace (*)     All other components within normal limits    Narrative:     Specimen Source->Urine   HEMOGLOBIN A1C - Abnormal; Notable for the following components:    Hemoglobin A1C >14.0 (*)     All other components within normal limits    Narrative:     With next lab draw   BASIC METABOLIC PANEL - Abnormal; Notable for the following components:    Sodium 133 (*)     Chloride 93 (*)     CO2 22 (*)     Glucose 508 (*)     Anion Gap 18 (*)     All other components within normal limits    Narrative:     With next lab draw   POCT GLUCOSE - Abnormal; Notable for the following components:    POCT Glucose >500 (*)     All other components within normal limits   ISTAT PROCEDURE - Abnormal; Notable for the following components:    POC Glucose 646 (*)     POC Sodium 131 (*)     POC TCO2 (MEASURED) 22 (*)     All other components within normal limits   ISTAT PROCEDURE - Abnormal; Notable for the following components:    POC PCO2 45.8 (*)     POC PO2 37 (*)     POC SATURATED O2 70 (*)     All other components within normal limits   ISTAT PROCEDURE - Abnormal; Notable for the following components:    POC Glucose 460 (*)     POC Sodium 133 (*)     All other components within normal limits   BETA - HYDROXYBUTYRATE, SERUM   TROPONIN I   B-TYPE NATRIURETIC PEPTIDE    PHOSPHORUS    Narrative:     With next lab draw   MAGNESIUM    Narrative:     With next lab draw   URINALYSIS MICROSCOPIC    Narrative:     Specimen Source->Urine   HIV 1 / 2 ANTIBODY   HEPATITIS C ANTIBODY   BASIC METABOLIC PANEL   PHOSPHORUS   SARS-COV-2 RDRP GENE   ISTAT PROCEDURE   ISTAT CREATININE   POCT GLUCOSE MONITORING CONTINUOUS   POCT GLUCOSE MONITORING CONTINUOUS     EKG Readings: (Independently Interpreted)   Initial Reading: No STEMI. Rhythm: Normal Sinus Rhythm.       Imaging Results          X-Ray Chest AP Portable (Final result)  Result time 03/15/22 18:47:30    Final result by Rosas Calero MD (03/15/22 18:47:30)                 Impression:      No acute intrathoracic abnormality.    Electronically signed by resident: Supa Marinelli  Date:    03/15/2022  Time:    18:05    Electronically signed by: Rosas Calero MD  Date:    03/15/2022  Time:    18:47             Narrative:    EXAMINATION:  XR CHEST AP PORTABLE    CLINICAL HISTORY:  hyperglycemia;    TECHNIQUE:  Single frontal view of the chest was performed.    COMPARISON:  Chest radiograph 03/07/2017    FINDINGS:  There has been interval revision of the AICD.  The overall appearance is unremarkable.  Cardiac leads overlie the field of view.    The lungs are clear, with normal appearance of pulmonary vasculature and no pleural effusion or pneumothorax.    The cardiac silhouette is enlarged but stable in size. The hilar and mediastinal contours are unremarkable.    Bones are intact.                               CTA STROKE MULTI-PHASE (Final result)  Result time 03/15/22 17:27:05    Final result by Krish Olmedo MD (03/15/22 17:27:05)                 Impression:      No acute intracranial process.  Chronic small right cerebellar infarct.    No significant stenosis at the carotid bifurcations by NASCET criteria.  The vertebral arteries are patent without advanced stenosis.    No major branch stenosis/occlusion intracranially.      Electronically  signed by: Krish Olmedo  Date:    03/15/2022  Time:    17:27             Narrative:    EXAMINATION:  CTA STROKE MULTI-PHASE    CLINICAL HISTORY:  Neuro deficit, acute, stroke suspected;    TECHNIQUE:  Non contrast low dose axial images were obtained thought the head. CT angiogram was performed from the level of the lisa to the top of the head following the IV administration of 100mL of Omnipaque 350.   Sagittal and coronal reconstructions and maximum intensity projection reconstructions were performed. Arterial stenosis percentages are based on NASCET measurement criteria.  Two additional phases of immediate post-contrast CTA images were performed through the head alone.    3D reformats were created to better visualize the [place] of [place].    COMPARISON:  None    FINDINGS:  Brain:    There is no evidence of hydrocephalus mass effect intracranial hemorrhage or acute territorial infarct.  The brain parenchyma maintains normal attenuation other than for a small chronic right cerebellar infarct.    The visualized sinuses and mastoid air cells are clear.    CT angiographic evaluation:    There is no advanced stenosis at the origin of the vessels from the aortic arch or at the origin of the vertebral arteries from the subclavian arteries bilaterally.  There is no significant stenosis at the carotid bifurcations bilaterally by NASCET criteria    Intracranially there is no evidence of major branch stenosis/occlusion at the [id number].  Calcifications result in no advanced stenosis at the cavernous/supraclinoid ICA.  A fetal origin of the left PCA is identified developmentally.    No evidence of venous sinus thrombosis.  No aneurysm is identified.    Bilateral thyroid nodules measuring up to 11 mm on the right not warranting further radiographic evaluation unless otherwise clinically warranted.  Borderline in size nonspecific left supraclavicular lymph node measuring 11 mm may be reactive.    This was communicated  to Dr. Grande at 17:14 on 03/15/2022.                                 Medications   insulin regular in 0.9 % NaCl 100 unit/100 mL (1 unit/mL) infusion (3 Units/hr Intravenous New Bag 3/15/22 1844)   dextrose 10% bolus 125 mL (has no administration in time range)   dextrose 10% bolus 250 mL (has no administration in time range)   sodium chloride 0.9% bolus 250 mL (250 mLs Intravenous New Bag 3/15/22 1851)   iohexoL (OMNIPAQUE 350) injection 100 mL (100 mLs Intravenous Given 3/15/22 1701)   insulin regular bolus from bag/infusion 8.62 Units (8.62 Units Intravenous Bolus from Bag 3/15/22 1845)     Medical Decision Making:   History:   Old Medical Records: I decided to obtain old medical records.  Initial Assessment:   46 year old female presenting with CP that has progressively worsened since yesterday. DAVIS, SOB associated with chest Pain. Also notes new vision changes since 1pm, left side ataxia, left sided numbness.   Differential Diagnosis:   Stroke, HHS/DKA, ACS  Clinical Tests:   Lab Tests: Ordered and Reviewed  The following lab test(s) were unremarkable: CBC, Urinalysis, BMP and Troponin  Radiological Study: Ordered and Reviewed  Medical Tests: Ordered and Reviewed  ED Management:  Stroke code called. No acute stroke noted on CTA brain.  CBG >600, VBG pH normal, HHS can explain symptoms  EKG without STEMI, trop  Insulin bolus and gtt started  Bedside echo with EF ~20%, 250cc NS bolus  Neuro symptoms resolving with insulin.  Patient will be admitted for observation while BG becomes normalized.  Other:   I discussed test(s) with the performing physician.            Attending Attestation:   Physician Attestation Statement for Resident:  As the supervising MD   Physician Attestation Statement: I have personally seen and examined this patient.   I agree with the above history. -:   As the supervising MD I agree with the above PE.    As the supervising MD I agree with the above treatment, course, plan, and  disposition.   -: 45 yo F w/ chest pain, left sided ataxia and diplopia. BG >500    vasc neurology consulted, CTA neg for acute CVA but old cerebellar CVA  Elevated BG with HSS  Bedside US with LVEF 20%. Small fluid bolus, insulin ggt  Patient not taking any meds (finacial issues, no PCP)  Neurologic symptoms resolved with BG improvement      Dg:   HHS  Systolic heart failure  Left sided ataxia, resolved  Old CVA  I have reviewed and agree with the residents interpretation of the following: lab data, CT scans, EKG and x-rays.  I have reviewed the following: old records at this facility.        Attending Critical Care:   Critical Care Times:   ==============================================================  · Total Critical Care Time - exclusive of procedural time: 30 minutes.  ==============================================================  Critical care reasons: HHS, acute neurologic deficit, systolic heart failure, chest pain.   Critical care was time spent personally by me on the following activities: obtaining history from patient or relative, examination of patient, review of old charts, ordering lab, x-rays, and/or EKG, development of treatment plan with patient or relative, ordering and performing treatments and interventions, evaluation of patient's response to treatment, discussion with consultants and interpretation of cardiac measurements.   Critical Care Condition: potentially life-threatening                    Clinical Impression:   Final diagnoses:  [R07.89] Chest discomfort  [R73.9] Hyperglycemia (Primary)  [R20.2] Paresthesia of left arm and leg  [R20.0, R20.2] Numbness and tingling of left side of face  [H53.2] Diplopia  [H53.8] Blurry vision, left eye                 Lloyd Grande MD  Resident  03/15/22 [id number]       Christel Baptiste MD  03/16/22 1240

## 2022-03-15 NOTE — CONSULTS
[facility] - Emergency Dept  Vascular Neurology  [facility]  Consult Note    Consults  Assessment/Plan:     Patient is a 46 y.o. year old female with:    * Double vision  Ms. Verma is a 46 year old female with PMH of CHF (AICD), LBBB, cardiomyopathy, DM, and HTN who presented with c/o chest pain, blurry vision, left sided numbness, and double vision that started at 1530. Patient reports she was at home laying in bed when symptoms started. However reports blurry vision is not a new problem and that has been going on for a while. She has never seen anyone for this issue. She reports that she was unable to take home medications due to financial hardship and did not have a PCP. Stroke code was activated. On exam, patient with functional exam. Patient reported that she could not move left arm however on distraction patient moved hand was able to maintain arm above her head with out dropping it. Noted increased tone on the left arm, mild left facial weakness, and fluctuating visual fields defect. Patient was noted to be hyperglycemic with blood glucose >600 and hyponatremic.CTA MP negative for LVO, high grade stenosis, vascular malformation, and hemorrhage. Patient has a pacemaker in which she was told it was not MRI compatible.     Patient is not a candidate for TPA as evidence of stroke mimic with blood glucose >600 and functional exam.     Recommend repeat CTH in 24 hours to rule out stroke if patient is not able to get MRI.    Mixed hyperlipidemia  Recommend getting updated lipid panel   No home statin medication on list   Would recommend starting Atorvastatin 40mg    Hyponatremia  Na 131 on arrival       Class 1 obesity in adult  Stroke RF    on diet and exercise when appropriate    Chronic systolic congestive heart failure  Last TTE in 2016 with EF 40%, mild LAE,mild SHIVA, hypokinetic septum and anterior wall   Recommend repeating TTE   Recommend consulting cardiology     Primary  hypertension  Stroke RF   bp 180s on arrival    Diabetes mellitus type 2 in obese  Stroke RF   Recommend getting Hgb A1C   Glucose on arrival 646        Chest pain  Initial presenting symptom   Onset since 1pm on 3/15   EKG obtained, official read pending       STROKE DOCUMENTATION     Acute Stroke Times   Last Known Normal Date: 03/15/22  Last Known Normal Time: 1530  Symptom Onset Date: 03/15/22  Symptom Onset Time: 1530  Stroke Team Called Date: 03/15/22  Stroke Team Called Time: 1637  Stroke Team Arrival Date: 03/15/22  Stroke Team Arrival Time: 1642  CT completed but images not available for review - spoke to document results: 1647  Alteplase Recommended: No  CTA Interpretation Time: 1648  Thrombectomy Recommended: No    NIH Scale:  1a. Level of Consciousness: 0-->Alert, keenly responsive  1b. LOC Questions: 0-->Answers both questions correctly  1c. LOC Commands: 0-->Performs both tasks correctly  2. Best Gaze: 0-->Normal  3. Visual: 1-->Partial hemianopia  4. Facial Palsy: 1-->Minor paralysis (flattened nasolabial fold, asymmetry on smiling)  5a. Motor Arm, Left: 1-->Drift, limb holds 90 (or 45) degrees, but drifts down before full 10 seconds, does not hit bed or other support  5b. Motor Arm, Right: 0-->No drift, limb holds 90 (or 45) degrees for full 10 secs  6a. Motor Leg, Left: 0-->No drift, leg holds 30 degree position for full 5 secs  6b. Motor Leg, Right: 0-->No drift, leg holds 30 degree position for full 5 secs  7. Limb Ataxia: 0-->Absent  8. Sensory: 1-->Mild-to-moderate sensory loss, patient feels pinprick is less sharp or is dull on the affected side, or there is a loss of superficial pain with pinprick, but patient is aware of being touched  9. Best Language: 0-->No aphasia, normal  10. Dysarthria: 0-->Normal  11. Extinction and Inattention (formerly Neglect): 1-->Visual, tactile, auditory, spatial, or personal inattention or extinction to bilateral simultaneous stimulation in one of the sensory  modalities  Total (NIH Stroke Scale): 5    Modified Costilla Score: 1  Jean Carlos Coma Scale:    ABCD2 Score:    [id number] Score:   HAS -BLED Score:   ICH Score:   Hunt & Pinedo Classification:       Thrombolysis Candidate? No, Strong suspicion for stroke mimic or alternative diagnosis     Delays to Thrombolysis?  No    Interventional Revascularization Candidate?   Is the patient eligible for mechanical endovascular reperfusion (MYLA)?  No; at this time symptoms not suggestive of large vessel occlusion    Delays to Thrombectomy? No    Hemorrhagic change of an Ischemic Stroke: Does this patient have an ischemic stroke with hemorrhagic changes? No     Subjective:     History of Present Illness:  Ms. Verma is a 46 year old female with PMH of CHF (AICD), LBBB, cardiomyopathy, DM, and HTN who presented with c/o chest pain, blurry vision, left sided numbness, and double vision that started at 1530. Patient reports she was at home laying in bed when symptoms started. However reports blurry vision is not a new problem and that has been going on for a while. She has never seen anyone for this issue. She reports that she was unable to take home medications due to financial hardship and did not have a PCP. Stroke code was activated. On exam, patient with functional exam. Patient reported that she could not move left arm however on distraction patient moved hand was able to maintain arm above her head with out dropping it. Noted increased tone on the left arm, mild left facial weakness, and fluctuating visual fields defect. Patient was noted to be hyperglycemic with blood glucose >600 and hyponatremic.CTA MP negative for LVO, high grade stenosis, vascular malformation, and hemorrhage. Patient has a pacemaker in which she was told it was not MRI compatible.     Patient is not a candidate for TPA as evidence of stroke mimic with blood glucose >600 and functional exam.     Recommend repeat CTH in 24 hours to rule out stroke if patient  is not able to get MRI.                  Past Medical History:   Diagnosis Date    Anticoagulant long-term use     CHF (congestive heart failure)     Diabetes mellitus     Hypertension      Past Surgical History:   Procedure Laterality Date    BREAST SURGERY Bilateral     reduction    CARDIAC DEFIBRILLATOR PLACEMENT  2018    INSERTION OF PACEMAKER  2018    TUBAL LIGATION       Family History   Problem Relation Age of Onset    Heart disease Mother     Arthritis Mother     Heart disease Father     Hypertension Father     Asthma Son     Heart disease Paternal Grandmother     Pulmonary embolism Unknown      Social History     Tobacco Use    Smoking status: Never Smoker    Smokeless tobacco: Never Used   Substance Use Topics    Alcohol use: No    Drug use: No     Review of patient's allergies indicates:   Allergen Reactions    Lisinopril Rash       Medications: I have reviewed the current medication administration record.    (Not in a hospital admission)      Review of Systems   Constitutional:  Positive for chills. Negative for diaphoresis and fever.   HENT:  Negative for drooling, hearing loss, rhinorrhea and trouble swallowing.    Eyes:  Positive for visual disturbance. Negative for redness.   Respiratory:  Positive for shortness of breath. Negative for cough and choking.    Cardiovascular:  Positive for chest pain.   Gastrointestinal:  Negative for diarrhea (Chronic) and vomiting.   Genitourinary:  Negative for difficulty urinating.   Musculoskeletal:  Negative for gait problem.   Skin:  Negative for rash.   Neurological:  Positive for facial asymmetry, weakness, numbness and headaches. Negative for speech difficulty.   Psychiatric/Behavioral:  Negative for agitation, behavioral problems and confusion. The patient is not nervous/anxious.    Objective:     Vital Signs (Most Recent):  Temp: 99.3 °F (37.4 °C) (03/15/22 1551)  Pulse: 100 (03/15/22 1551)  Resp: 20 (03/15/22 1551)  BP: (!) 172/81  (03/15/22 1551)  SpO2: 97 % (03/15/22 1551)    Vital Signs Range (Last 24H):  Temp:  [99.3 °F (37.4 °C)]   Pulse:  [100]   Resp:  [20]   BP: (172)/(81)   SpO2:  [97 %]     Physical Exam  Vitals and nursing note reviewed.   Constitutional:       General: She is not in acute distress.     Appearance: She is not ill-appearing.   HENT:      Head: Normocephalic and atraumatic.      Right Ear: External ear normal.      Left Ear: External ear normal.      Nose: No rhinorrhea.   Eyes:      General: Visual field deficit present. No scleral icterus.        Right eye: No discharge.         Left eye: No discharge.      Extraocular Movements: Extraocular movements intact.   Cardiovascular:      Rate and Rhythm: Normal rate.   Pulmonary:      Effort: Pulmonary effort is normal. No respiratory distress.   Abdominal:      General: There is no distension.   Musculoskeletal:         General: No deformity.      Comments: Increased tone to the left arm, able to hold up above her head but not on her own. Arm did not drop down to bed.   Skin:     General: Skin is warm and dry.   Neurological:      Mental Status: She is alert and oriented to person, place, and time.      Cranial Nerves: Facial asymmetry present. No dysarthria.      Sensory: Sensory deficit present.   Psychiatric:         Mood and Affect: Affect is tearful.         Behavior: Behavior is cooperative.       Neurological Exam:   LOC: alert  Attention Span: Good   Language: No aphasia  Articulation: No dysarthria  Orientation: Person, Place, Time   Visual Fields: Fluctuating exam from left hemianopsia to been able to see   EOM (CN III, IV, VI): Full/intact  Facial Movement (CN VII): mild left droop  Sensation: decreased sensation to the left      Laboratory:  CMP: No results for input(s): GLUCOSE, CALCIUM, ALBUMIN, PROT, NA, K, CO2, CL, BUN, CREATININE, ALKPHOS, ALT, AST, BILITOT in the last 24 hours.  CBC: No results for input(s): WBC, RBC, HGB, HCT, PLT, MCV, MCH, MCHC in  the last 168 hours.  Lipid Panel: No results for input(s): CHOL, LDLCALC, HDL, TRIG in the last 168 hours.  Coagulation: No results for input(s): PT, INR, APTT in the last 168 hours.  Hgb A1C: No results for input(s): HGBA1C in the last 168 hours.  TSH: No results for input(s): TSH in the last 168 hours.    Diagnostic Results:      Brain/Vessel imaging:  CTA MP 3/15/22   No acute intracranial process.  Chronic small right cerebellar infarct.     No significant stenosis at the carotid bifurcations by NASCET criteria.  The vertebral arteries are patent without advanced stenosis.     No major branch stenosis/occlusion intracranially.           Shima Milner NP  [facility]  Department of Vascular Neurology   [facility] Dept

## 2022-03-15 NOTE — ED NOTES
I-STAT Chem-8+ Results:   Value Reference Range   Sodium 131 136-145 mmol/L   Potassium  4.6 3.5-5.1 mmol/L   Chloride 95  mmol/L   Ionized Calcium 1.19 1.06-1.42 mmol/L   CO2 (measured) 22 23-29 mmol/L   Glucose 646  mg/dL   BUN 6 6-30 mg/dL   Creatinine 0.5 0.5-1.4 mg/dL   Hematocrit 44 36-54%

## 2022-03-15 NOTE — SUBJECTIVE & OBJECTIVE
Past Medical History:   Diagnosis Date    Anticoagulant long-term use     CHF (congestive heart failure)     Diabetes mellitus     Hypertension      Past Surgical History:   Procedure Laterality Date    BREAST SURGERY Bilateral     reduction    CARDIAC DEFIBRILLATOR PLACEMENT  2018    INSERTION OF PACEMAKER  2018    TUBAL LIGATION       Family History   Problem Relation Age of Onset    Heart disease Mother     Arthritis Mother     Heart disease Father     Hypertension Father     Asthma Son     Heart disease Paternal Grandmother     Pulmonary embolism Unknown      Social History     Tobacco Use    Smoking status: Never Smoker    Smokeless tobacco: Never Used   Substance Use Topics    Alcohol use: No    Drug use: No     Review of patient's allergies indicates:   Allergen Reactions    Lisinopril Rash       Medications: I have reviewed the current medication administration record.    (Not in a hospital admission)      Review of Systems   Constitutional:  Positive for chills. Negative for diaphoresis and fever.   HENT:  Negative for drooling, hearing loss, rhinorrhea and trouble swallowing.    Eyes:  Positive for visual disturbance. Negative for redness.   Respiratory:  Positive for shortness of breath. Negative for cough and choking.    Cardiovascular:  Positive for chest pain.   Gastrointestinal:  Negative for diarrhea (Chronic) and vomiting.   Genitourinary:  Negative for difficulty urinating.   Musculoskeletal:  Negative for gait problem.   Skin:  Negative for rash.   Neurological:  Positive for facial asymmetry, weakness, numbness and headaches. Negative for speech difficulty.   Psychiatric/Behavioral:  Negative for agitation, behavioral problems and confusion. The patient is not nervous/anxious.    Objective:     Vital Signs (Most Recent):  Temp: 99.3 °F (37.4 °C) (03/15/22 1551)  Pulse: 100 (03/15/22 1551)  Resp: 20 (03/15/22 1551)  BP: (!) 172/81 (03/15/22 1551)  SpO2: 97 % (03/15/22 1551)    Vital Signs  Range (Last 24H):  Temp:  [99.3 °F (37.4 °C)]   Pulse:  [100]   Resp:  [20]   BP: (172)/(81)   SpO2:  [97 %]     Physical Exam  Vitals and nursing note reviewed.   Constitutional:       General: She is not in acute distress.     Appearance: She is not ill-appearing.   HENT:      Head: Normocephalic and atraumatic.      Right Ear: External ear normal.      Left Ear: External ear normal.      Nose: No rhinorrhea.   Eyes:      General: Visual field deficit present. No scleral icterus.        Right eye: No discharge.         Left eye: No discharge.      Extraocular Movements: Extraocular movements intact.   Cardiovascular:      Rate and Rhythm: Normal rate.   Pulmonary:      Effort: Pulmonary effort is normal. No respiratory distress.   Abdominal:      General: There is no distension.   Musculoskeletal:         General: No deformity.      Comments: Increased tone to the left arm, able to hold up above her head but not on her own. Arm did not drop down to bed.   Skin:     General: Skin is warm and dry.   Neurological:      Mental Status: She is alert and oriented to person, place, and time.      Cranial Nerves: Facial asymmetry present. No dysarthria.      Sensory: Sensory deficit present.   Psychiatric:         Mood and Affect: Affect is tearful.         Behavior: Behavior is cooperative.       Neurological Exam:   LOC: alert  Attention Span: Good   Language: No aphasia  Articulation: No dysarthria  Orientation: Person, Place, Time   Visual Fields: Fluctuating exam from left hemianopsia to been able to see   EOM (CN III, IV, VI): Full/intact  Facial Movement (CN VII): mild left droop  Sensation: decreased sensation to the left      Laboratory:  CMP: No results for input(s): GLUCOSE, CALCIUM, ALBUMIN, PROT, NA, K, CO2, CL, BUN, CREATININE, ALKPHOS, ALT, AST, BILITOT in the last 24 hours.  CBC: No results for input(s): WBC, RBC, HGB, HCT, PLT, MCV, MCH, MCHC in the last 168 hours.  Lipid Panel: No results for input(s):  CHOL, LDLCALC, HDL, TRIG in the last 168 hours.  Coagulation: No results for input(s): PT, INR, APTT in the last 168 hours.  Hgb A1C: No results for input(s): HGBA1C in the last 168 hours.  TSH: No results for input(s): TSH in the last 168 hours.    Diagnostic Results:      Brain/Vessel imaging:  CTA MP 3/15/22   No acute intracranial process.  Chronic small right cerebellar infarct.     No significant stenosis at the carotid bifurcations by NASCET criteria.  The vertebral arteries are patent without advanced stenosis.     No major branch stenosis/occlusion intracranially.

## 2022-03-15 NOTE — ED TRIAGE NOTES
Pt complains of chest pain to right side of chest radiating to right shoulder blade rating pain 8/10 States the pain started around 13:10 today  States the pain is worse on deep breathing

## 2022-03-15 NOTE — ED NOTES
Stroke code called by ED resident. Stroke code paged overhead. Stroke team notified. Pt to CT on cont cardiac monitor cont pulse ox and auto BP cuff.

## 2022-03-15 NOTE — ED NOTES
I-STAT Chem-8+ Results:   Value Reference Range   Sodium 133 136-145 mmol/L   Potassium  4.4 3.5-5.1 mmol/L   Chloride 96  mmol/L   Ionized Calcium 1.21 1.06-1.42 mmol/L   CO2 (measured) 26 23-29 mmol/L   Glucose 460  mg/dL   BUN 6 6-30 mg/dL   Creatinine 0.5 0.5-1.4 mg/dL   Hematocrit 42 36-54%

## 2022-03-15 NOTE — ED NOTES
Pt identifiers Samaria Verma were checked and are correct  LOC: The patient is awake, alert, aware of environment with an appropriate affect. Oriented x4, speaking appropriately Pt states she has had blurred vision in left eye but it is worse today   APPEARANCE: Pt rates right side chest pain an 8/10 , in no acute distress, pt is clean and well groomed, clothing properly fastened  Pt is on a cardiac monitor and pulse oximetry  SKIN: Skin warm, dry and intact, normal skin turgor, moist mucus membranes  RESPIRATORY: Airway is open and patent, respirations are spontaneous, even and unlabored, normal effort and rate Breath sounds clear savita to all lung fields on ausucultation  CARDIAC: Normal rate and rhythm, no peripheral edema noted, capillary refill < 3 seconds, bilateral radial pulses 2+  ABDOMEN: Soft, nontender, nondistended. Bowel sounds present to all four quad of abd on ausucultation  NEUROLOGIC: PERRL, States she has blurred vision in left eye  facial expression is symmetrical, patient moving all extremities spontaneously, normal sensation in all extremities when touched with a finger.  Follows all commands appropriately  MUSCULOSKELETAL: No obvious deformities.

## 2022-03-15 NOTE — ASSESSMENT & PLAN NOTE
Recommend getting updated lipid panel   No home statin medication on list   Would recommend starting Atorvastatin 40mg

## 2022-03-15 NOTE — ASSESSMENT & PLAN NOTE
Last TTE in 2016 with EF 40%, mild LAE,mild SHIVA, hypokinetic septum and anterior wall   Recommend repeating TTE   Recommend consulting cardiology

## 2022-03-16 PROBLEM — R29.818 TRANSIENT NEUROLOGICAL SYMPTOMS: Status: ACTIVE | Noted: 2022-03-16

## 2022-03-16 PROBLEM — H53.2 DOUBLE VISION: Status: RESOLVED | Noted: 2022-03-15 | Resolved: 2022-03-16

## 2022-03-16 LAB
ALBUMIN SERPL BCP-MCNC: 2 G/DL (ref 3.5–5.2)
ALP SERPL-CCNC: 60 U/L (ref 55–135)
ALT SERPL W/O P-5'-P-CCNC: 18 U/L (ref 10–44)
ANION GAP SERPL CALC-SCNC: 10 MMOL/L (ref 8–16)
ANION GAP SERPL CALC-SCNC: 14 MMOL/L (ref 8–16)
ANION GAP SERPL CALC-SCNC: 8 MMOL/L (ref 8–16)
ASCENDING AORTA: 2.26 CM
AST SERPL-CCNC: 13 U/L (ref 10–40)
AV INDEX (PROSTH): 0.62
AV MEAN GRADIENT: 5 MMHG
AV PEAK GRADIENT: 9 MMHG
AV VALVE AREA: 1.85 CM2
AV VELOCITY RATIO: 0.56
BASOPHILS # BLD AUTO: 0.03 K/UL (ref 0–0.2)
BASOPHILS NFR BLD: 0.3 % (ref 0–1.9)
BILIRUB SERPL-MCNC: 0.2 MG/DL (ref 0.1–1)
BSA FOR ECHO PROCEDURE: 1.99 M2
BUN SERPL-MCNC: 7 MG/DL (ref 6–20)
BUN SERPL-MCNC: 8 MG/DL (ref 6–20)
BUN SERPL-MCNC: 9 MG/DL (ref 6–20)
CALCIUM SERPL-MCNC: 5.8 MG/DL (ref 8.7–10.5)
CALCIUM SERPL-MCNC: 9.3 MG/DL (ref 8.7–10.5)
CALCIUM SERPL-MCNC: 9.3 MG/DL (ref 8.7–10.5)
CHLORIDE SERPL-SCNC: 100 MMOL/L (ref 95–110)
CHLORIDE SERPL-SCNC: 117 MMOL/L (ref 95–110)
CHLORIDE SERPL-SCNC: 99 MMOL/L (ref 95–110)
CO2 SERPL-SCNC: 16 MMOL/L (ref 23–29)
CO2 SERPL-SCNC: 24 MMOL/L (ref 23–29)
CO2 SERPL-SCNC: 24 MMOL/L (ref 23–29)
CREAT SERPL-MCNC: 0.4 MG/DL (ref 0.5–1.4)
CREAT SERPL-MCNC: 0.6 MG/DL (ref 0.5–1.4)
CREAT SERPL-MCNC: 0.6 MG/DL (ref 0.5–1.4)
CV ECHO LV RWT: 0.31 CM
DIFFERENTIAL METHOD: ABNORMAL
DOP CALC AO PEAK VEL: 1.48 M/S
DOP CALC AO VTI: 27.94 CM
DOP CALC LVOT AREA: 3 CM2
DOP CALC LVOT DIAMETER: 1.95 CM
DOP CALC LVOT PEAK VEL: 0.83 M/S
DOP CALC LVOT STROKE VOLUME: 51.79 CM3
DOP CALCLVOT PEAK VEL VTI: 17.35 CM
ECHO LV POSTERIOR WALL: 0.78 CM (ref 0.6–1.1)
EJECTION FRACTION: 25 %
EOSINOPHIL # BLD AUTO: 0.2 K/UL (ref 0–0.5)
EOSINOPHIL NFR BLD: 2.4 % (ref 0–8)
ERYTHROCYTE [DISTWIDTH] IN BLOOD BY AUTOMATED COUNT: 13.9 % (ref 11.5–14.5)
EST. GFR  (AFRICAN AMERICAN): >60 ML/MIN/1.73 M^2
EST. GFR  (NON AFRICAN AMERICAN): >60 ML/MIN/1.73 M^2
FRACTIONAL SHORTENING: 19 % (ref 28–44)
GLUCOSE SERPL-MCNC: 195 MG/DL (ref 70–110)
GLUCOSE SERPL-MCNC: 281 MG/DL (ref 70–110)
GLUCOSE SERPL-MCNC: 323 MG/DL (ref 70–110)
HCT VFR BLD AUTO: 36.9 % (ref 37–48.5)
HCV AB SERPL QL IA: NEGATIVE
HGB BLD-MCNC: 11.4 G/DL (ref 12–16)
HIV 1+2 AB+HIV1 P24 AG SERPL QL IA: NEGATIVE
IMM GRANULOCYTES # BLD AUTO: 0.04 K/UL (ref 0–0.04)
IMM GRANULOCYTES NFR BLD AUTO: 0.4 % (ref 0–0.5)
INTERVENTRICULAR SEPTUM: 1.07 CM (ref 0.6–1.1)
LA MAJOR: 5.79 CM
LA MINOR: 5.84 CM
LA WIDTH: 3.92 CM
LEFT ATRIUM SIZE: 3.49 CM
LEFT ATRIUM VOLUME INDEX: 34.9 ML/M2
LEFT ATRIUM VOLUME: 67.62 CM3
LEFT INTERNAL DIMENSION IN SYSTOLE: 4.13 CM (ref 2.1–4)
LEFT VENTRICLE DIASTOLIC VOLUME INDEX: 63.95 ML/M2
LEFT VENTRICLE DIASTOLIC VOLUME: 124.06 ML
LEFT VENTRICLE MASS INDEX: 87 G/M2
LEFT VENTRICLE SYSTOLIC VOLUME INDEX: 38.9 ML/M2
LEFT VENTRICLE SYSTOLIC VOLUME: 75.51 ML
LEFT VENTRICULAR INTERNAL DIMENSION IN DIASTOLE: 5.1 CM (ref 3.5–6)
LEFT VENTRICULAR MASS: 169.54 G
LYMPHOCYTES # BLD AUTO: 3.8 K/UL (ref 1–4.8)
LYMPHOCYTES NFR BLD: 42.3 % (ref 18–48)
MAGNESIUM SERPL-MCNC: 1.1 MG/DL (ref 1.6–2.6)
MCH RBC QN AUTO: 25.1 PG (ref 27–31)
MCHC RBC AUTO-ENTMCNC: 30.9 G/DL (ref 32–36)
MCV RBC AUTO: 81 FL (ref 82–98)
MONOCYTES # BLD AUTO: 0.5 K/UL (ref 0.3–1)
MONOCYTES NFR BLD: 5.6 % (ref 4–15)
NEUTROPHILS # BLD AUTO: 4.4 K/UL (ref 1.8–7.7)
NEUTROPHILS NFR BLD: 49 % (ref 38–73)
NRBC BLD-RTO: 0 /100 WBC
PHOSPHATE SERPL-MCNC: 2.4 MG/DL (ref 2.7–4.5)
PHOSPHATE SERPL-MCNC: 3.1 MG/DL (ref 2.7–4.5)
PLATELET # BLD AUTO: 284 K/UL (ref 150–450)
PMV BLD AUTO: 10 FL (ref 9.2–12.9)
POCT GLUCOSE: 221 MG/DL (ref 70–110)
POCT GLUCOSE: 237 MG/DL (ref 70–110)
POCT GLUCOSE: 238 MG/DL (ref 70–110)
POCT GLUCOSE: 241 MG/DL (ref 70–110)
POCT GLUCOSE: 245 MG/DL (ref 70–110)
POCT GLUCOSE: 248 MG/DL (ref 70–110)
POCT GLUCOSE: 270 MG/DL (ref 70–110)
POCT GLUCOSE: 272 MG/DL (ref 70–110)
POCT GLUCOSE: 278 MG/DL (ref 70–110)
POCT GLUCOSE: 278 MG/DL (ref 70–110)
POCT GLUCOSE: 366 MG/DL (ref 70–110)
POTASSIUM SERPL-SCNC: 2.3 MMOL/L (ref 3.5–5.1)
POTASSIUM SERPL-SCNC: 3.5 MMOL/L (ref 3.5–5.1)
POTASSIUM SERPL-SCNC: 4.3 MMOL/L (ref 3.5–5.1)
PROT SERPL-MCNC: 4.1 G/DL (ref 6–8.4)
RA MAJOR: 4.95 CM
RA PRESSURE: 3 MMHG
RA WIDTH: 4.28 CM
RBC # BLD AUTO: 4.55 M/UL (ref 4–5.4)
RIGHT VENTRICULAR END-DIASTOLIC DIMENSION: 3.46 CM
SINUS: 2.23 CM
SODIUM SERPL-SCNC: 131 MMOL/L (ref 136–145)
SODIUM SERPL-SCNC: 138 MMOL/L (ref 136–145)
SODIUM SERPL-SCNC: 143 MMOL/L (ref 136–145)
STJ: 2.34 CM
TDI LATERAL: 0.07 M/S
TDI SEPTAL: 0.07 M/S
TDI: 0.07 M/S
TRICUSPID ANNULAR PLANE SYSTOLIC EXCURSION: 2.85 CM
WBC # BLD AUTO: 9.05 K/UL (ref 3.9–12.7)

## 2022-03-16 PROCEDURE — 99900035 HC TECH TIME PER 15 MIN (STAT)

## 2022-03-16 PROCEDURE — 84100 ASSAY OF PHOSPHORUS: CPT

## 2022-03-16 PROCEDURE — 99233 PR SUBSEQUENT HOSPITAL CARE,LEVL III: ICD-10-PCS | Mod: ,,, | Performed by: PSYCHIATRY & NEUROLOGY

## 2022-03-16 PROCEDURE — 36415 COLL VENOUS BLD VENIPUNCTURE: CPT | Performed by: STUDENT IN AN ORGANIZED HEALTH CARE EDUCATION/TRAINING PROGRAM

## 2022-03-16 PROCEDURE — 25000003 PHARM REV CODE 250: Performed by: FAMILY MEDICINE

## 2022-03-16 PROCEDURE — 99222 PR INITIAL HOSPITAL CARE,LEVL II: ICD-10-PCS | Mod: ,,, | Performed by: INTERNAL MEDICINE

## 2022-03-16 PROCEDURE — 80048 BASIC METABOLIC PNL TOTAL CA: CPT

## 2022-03-16 PROCEDURE — 63600175 PHARM REV CODE 636 W HCPCS: Performed by: STUDENT IN AN ORGANIZED HEALTH CARE EDUCATION/TRAINING PROGRAM

## 2022-03-16 PROCEDURE — 63600175 PHARM REV CODE 636 W HCPCS: Performed by: FAMILY MEDICINE

## 2022-03-16 PROCEDURE — 85025 COMPLETE CBC W/AUTO DIFF WBC: CPT | Performed by: STUDENT IN AN ORGANIZED HEALTH CARE EDUCATION/TRAINING PROGRAM

## 2022-03-16 PROCEDURE — 25000003 PHARM REV CODE 250: Performed by: STUDENT IN AN ORGANIZED HEALTH CARE EDUCATION/TRAINING PROGRAM

## 2022-03-16 PROCEDURE — 94761 N-INVAS EAR/PLS OXIMETRY MLT: CPT

## 2022-03-16 PROCEDURE — 80053 COMPREHEN METABOLIC PANEL: CPT | Performed by: STUDENT IN AN ORGANIZED HEALTH CARE EDUCATION/TRAINING PROGRAM

## 2022-03-16 PROCEDURE — 80048 BASIC METABOLIC PNL TOTAL CA: CPT | Mod: 91 | Performed by: STUDENT IN AN ORGANIZED HEALTH CARE EDUCATION/TRAINING PROGRAM

## 2022-03-16 PROCEDURE — 36415 COLL VENOUS BLD VENIPUNCTURE: CPT

## 2022-03-16 PROCEDURE — 99222 1ST HOSP IP/OBS MODERATE 55: CPT | Mod: ,,, | Performed by: INTERNAL MEDICINE

## 2022-03-16 PROCEDURE — 25500020 PHARM REV CODE 255: Performed by: INTERNAL MEDICINE

## 2022-03-16 PROCEDURE — 84100 ASSAY OF PHOSPHORUS: CPT | Mod: 91 | Performed by: STUDENT IN AN ORGANIZED HEALTH CARE EDUCATION/TRAINING PROGRAM

## 2022-03-16 PROCEDURE — 99233 SBSQ HOSP IP/OBS HIGH 50: CPT | Mod: ,,, | Performed by: STUDENT IN AN ORGANIZED HEALTH CARE EDUCATION/TRAINING PROGRAM

## 2022-03-16 PROCEDURE — 83735 ASSAY OF MAGNESIUM: CPT | Performed by: STUDENT IN AN ORGANIZED HEALTH CARE EDUCATION/TRAINING PROGRAM

## 2022-03-16 PROCEDURE — 11000001 HC ACUTE MED/SURG PRIVATE ROOM

## 2022-03-16 PROCEDURE — 96372 THER/PROPH/DIAG INJ SC/IM: CPT | Performed by: FAMILY MEDICINE

## 2022-03-16 PROCEDURE — 99233 SBSQ HOSP IP/OBS HIGH 50: CPT | Mod: ,,, | Performed by: PSYCHIATRY & NEUROLOGY

## 2022-03-16 PROCEDURE — 99233 PR SUBSEQUENT HOSPITAL CARE,LEVL III: ICD-10-PCS | Mod: ,,, | Performed by: STUDENT IN AN ORGANIZED HEALTH CARE EDUCATION/TRAINING PROGRAM

## 2022-03-16 PROCEDURE — C9399 UNCLASSIFIED DRUGS OR BIOLOG: HCPCS | Performed by: STUDENT IN AN ORGANIZED HEALTH CARE EDUCATION/TRAINING PROGRAM

## 2022-03-16 RX ORDER — POTASSIUM CHLORIDE 7.45 MG/ML
40 INJECTION INTRAVENOUS ONCE
Status: DISCONTINUED | OUTPATIENT
Start: 2022-03-16 | End: 2022-03-16

## 2022-03-16 RX ORDER — POTASSIUM CHLORIDE 7.45 MG/ML
10 INJECTION INTRAVENOUS
Status: COMPLETED | OUTPATIENT
Start: 2022-03-16 | End: 2022-03-16

## 2022-03-16 RX ORDER — INSULIN ASPART 100 [IU]/ML
6 INJECTION, SOLUTION INTRAVENOUS; SUBCUTANEOUS
Status: DISCONTINUED | OUTPATIENT
Start: 2022-03-16 | End: 2022-03-16

## 2022-03-16 RX ORDER — INSULIN ASPART 100 [IU]/ML
0-5 INJECTION, SOLUTION INTRAVENOUS; SUBCUTANEOUS
Status: DISCONTINUED | OUTPATIENT
Start: 2022-03-16 | End: 2022-03-16

## 2022-03-16 RX ORDER — POTASSIUM CHLORIDE 750 MG/1
40 CAPSULE, EXTENDED RELEASE ORAL 2 TIMES DAILY
Status: DISCONTINUED | OUTPATIENT
Start: 2022-03-16 | End: 2022-03-16

## 2022-03-16 RX ORDER — INSULIN ASPART 100 [IU]/ML
6 INJECTION, SOLUTION INTRAVENOUS; SUBCUTANEOUS
Status: DISCONTINUED | OUTPATIENT
Start: 2022-03-16 | End: 2022-03-17

## 2022-03-16 RX ORDER — HYDROXYZINE HYDROCHLORIDE 25 MG/1
25 TABLET, FILM COATED ORAL 3 TIMES DAILY PRN
Status: DISCONTINUED | OUTPATIENT
Start: 2022-03-16 | End: 2022-03-18 | Stop reason: HOSPADM

## 2022-03-16 RX ORDER — MAGNESIUM SULFATE HEPTAHYDRATE 40 MG/ML
4 INJECTION, SOLUTION INTRAVENOUS ONCE
Status: COMPLETED | OUTPATIENT
Start: 2022-03-16 | End: 2022-03-16

## 2022-03-16 RX ADMIN — POTASSIUM CHLORIDE 10 MEQ: 7.45 INJECTION INTRAVENOUS at 11:03

## 2022-03-16 RX ADMIN — HEPARIN SODIUM 5000 UNITS: 5000 INJECTION INTRAVENOUS; SUBCUTANEOUS at 08:03

## 2022-03-16 RX ADMIN — INSULIN ASPART 4 UNITS: 100 INJECTION, SOLUTION INTRAVENOUS; SUBCUTANEOUS at 01:03

## 2022-03-16 RX ADMIN — POTASSIUM CHLORIDE 10 MEQ: 7.45 INJECTION INTRAVENOUS at 01:03

## 2022-03-16 RX ADMIN — CARVEDILOL 6.25 MG: 6.25 TABLET, FILM COATED ORAL at 08:03

## 2022-03-16 RX ADMIN — ASPIRIN 81 MG: 81 TABLET, COATED ORAL at 09:03

## 2022-03-16 RX ADMIN — ONDANSETRON 4 MG: 2 INJECTION INTRAMUSCULAR; INTRAVENOUS at 08:03

## 2022-03-16 RX ADMIN — INSULIN HUMAN 1.8 UNITS/HR: 1 INJECTION, SOLUTION INTRAVENOUS at 12:03

## 2022-03-16 RX ADMIN — POTASSIUM BICARBONATE 50 MEQ: 978 TABLET, EFFERVESCENT ORAL at 09:03

## 2022-03-16 RX ADMIN — CARVEDILOL 6.25 MG: 6.25 TABLET, FILM COATED ORAL at 09:03

## 2022-03-16 RX ADMIN — POTASSIUM CHLORIDE 10 MEQ: 7.45 INJECTION INTRAVENOUS at 10:03

## 2022-03-16 RX ADMIN — ATORVASTATIN CALCIUM 40 MG: 20 TABLET, FILM COATED ORAL at 09:03

## 2022-03-16 RX ADMIN — POTASSIUM CHLORIDE 10 MEQ: 7.45 INJECTION INTRAVENOUS at 02:03

## 2022-03-16 RX ADMIN — HUMAN ALBUMIN MICROSPHERES AND PERFLUTREN 0.66 MG: 10; .22 INJECTION, SOLUTION INTRAVENOUS at 09:03

## 2022-03-16 RX ADMIN — LOSARTAN POTASSIUM 12.5 MG: 25 TABLET, FILM COATED ORAL at 09:03

## 2022-03-16 RX ADMIN — MAGNESIUM SULFATE IN WATER 4 G: 40 INJECTION, SOLUTION INTRAVENOUS at 08:03

## 2022-03-16 RX ADMIN — INSULIN ASPART 6 UNITS: 100 INJECTION, SOLUTION INTRAVENOUS; SUBCUTANEOUS at 05:03

## 2022-03-16 RX ADMIN — POTASSIUM BICARBONATE 50 MEQ: 978 TABLET, EFFERVESCENT ORAL at 04:03

## 2022-03-16 RX ADMIN — CALCIUM GLUCONATE 2 G: 98 INJECTION, SOLUTION INTRAVENOUS at 10:03

## 2022-03-16 RX ADMIN — INSULIN DETEMIR 10 UNITS: 100 INJECTION, SOLUTION SUBCUTANEOUS at 08:03

## 2022-03-16 RX ADMIN — HYDROXYZINE HYDROCHLORIDE 25 MG: 25 TABLET ORAL at 03:03

## 2022-03-16 NOTE — ASSESSMENT & PLAN NOTE
- Neurovascular consulted  ;  Appreciate recs  - improved with IVFs and insulin ; felt likely due to HHS  - repeat CTH in 24hrs to rule out stroke  (patient reports AICD not compatible with MRI)  - see further management above

## 2022-03-16 NOTE — H&P
[facility] - Emergency Dept  [facility] Medicine  History & Physical    Patient Name: Samaria Verma  MRN: [id number]  Patient Class: OP- Observation  Admission Date: 3/15/2022  Attending Physician: Damian Maxwell MD   Primary Care Provider: Fab Cesar MD         Patient information was obtained from patient, past medical records and ER records.     Subjective:     Principal Problem:Hyperosmolar hyperglycemic state (HHS)    Chief Complaint:   Chief Complaint   Patient presents with    Chest Pain     Has aicd        HPI: This is a 46 year old female with a past medical history of HFrEF (AICD), LBBB, cardiomyopathy, DM, and HTN who presents to the ED with chief complaint of blurry vision, left sided weakness/numbness, and chest pain. She reports that symptoms started this afternoon at 1530. She has not been taking any medications for the past year due to financial hardship and not having PCP. Patient states that she has never been prescribed insulin for DM control. In the ED patient afebrile and hemodynamically stable saturating well on room air. Stroke code was activated and Neurovasc evaluated patient. CTA MP performed and negative for LVO, high grade stenosis, vascular malformation, and hemorrhage. Patient has a pacemaker in which she was told it was not MRI compatible. Patient BG on presentation >600, AG 18, pH 7.39, beta-hydroxybutyrate negative and no ketones in urine. Patient started on IVFs and insulin gtt with resolution of her neurological symptoms. She was admitted to the care of medicine for further evaluation and management of suspected HHS.      Past Medical History:   Diagnosis Date    Anticoagulant long-term use     CHF (congestive heart failure)     Chronic systolic congestive heart failure 3/7/2017    Diabetes mellitus     Diabetes mellitus type 2 in obese 1/6/2016    Hypertension     Primary hypertension 1/6/2016       Past Surgical History:   Procedure Laterality Date    BREAST SURGERY  Bilateral     reduction    CARDIAC DEFIBRILLATOR PLACEMENT  2018    INSERTION OF PACEMAKER  2018    TUBAL LIGATION         Review of patient's allergies indicates:   Allergen Reactions    Lisinopril Rash       No current facility-administered medications on file prior to encounter.     Current Outpatient Medications on File Prior to Encounter   Medication Sig    albuterol (PROVENTIL/VENTOLIN HFA) 90 mcg/actuation inhaler Inhale 2 puffs into the lungs every 6 (six) hours as needed. Rescue    aspirin (ECOTRIN) 81 MG EC tablet Take 81 mg by mouth once daily.    azelastine (ASTELIN) 137 mcg (0.1 %) nasal spray 1 spray (137 mcg total) by Nasal route 2 (two) times daily.    carvedilol (COREG) 25 MG tablet Take 1 tablet (25 mg total) by mouth 2 (two) times daily.    clonazePAM (KLONOPIN) 0.5 MG tablet Take 0.5 mg by mouth 2 (two) times daily as needed for Anxiety.    ergocalciferol (ERGOCALCIFEROL) 50,000 unit Cap Take 50,000 Units by mouth every 7 days.    fluticasone propionate (FLONASE) 50 mcg/actuation nasal spray 1 spray (50 mcg total) by Each Nostril route once daily.    fluticasone propionate (FLONASE) 50 mcg/actuation nasal spray 1 spray (50 mcg total) by Each Nostril route once daily.    furosemide (LASIX) 80 MG tablet Take 0.5 tablets (40 mg total) by mouth 2 (two) times daily. (Patient taking differently: Take 80 mg by mouth once daily. )    gabapentin (NEURONTIN) 300 MG capsule Take 1 capsule (300 mg total) by mouth 2 (two) times daily.    metformin (GLUCOPHAGE) 1000 MG tablet Take 1 tablet (1,000 mg total) by mouth 2 (two) times daily with meals.    nitroGLYCERIN (NITROSTAT) 0.4 MG SL tablet Place 1 tablet (0.4 mg total) under the tongue every 5 (five) minutes as needed for Chest pain.    sacubitriL-valsartan (ENTRESTO) 24-26 mg per tablet Take 1 tablet by mouth 2 (two) times daily.    spironolactone (ALDACTONE) 50 MG tablet Take 50 mg by mouth once daily.     Family History       Problem  Relation (Age of Onset)    Arthritis Mother    Asthma Son    Heart disease Mother, Father, Paternal Grandmother    Hypertension Father    Pulmonary embolism           Tobacco Use    Smoking status: Never Smoker    Smokeless tobacco: Never Used   Substance and Sexual Activity    Alcohol use: No    Drug use: No    Sexual activity: Yes     Partners: Male     Review of Systems   Constitutional:  Negative for chills, fatigue and fever.   HENT:  Negative for sore throat and trouble swallowing.    Eyes:  Positive for visual disturbance. Negative for photophobia.   Respiratory:  Negative for cough, shortness of breath and wheezing.    Cardiovascular:  Positive for chest pain. Negative for palpitations and leg swelling.   Gastrointestinal:  Negative for abdominal distention, abdominal pain, diarrhea, nausea and vomiting.   Genitourinary:  Negative for dysuria and hematuria.   Musculoskeletal:  Negative for neck pain and neck stiffness.   Skin:  Negative for rash and wound.   Neurological:  Positive for weakness (Lt sided) and numbness (Lt sided). Negative for seizures, syncope, light-headedness and headaches.   Psychiatric/Behavioral:  Negative for confusion and decreased concentration.    Objective:     Vital Signs (Most Recent):  Temp: 98.1 °F (36.7 °C) (03/15/22 2003)  Pulse: 91 (03/15/22 2003)  Resp: 16 (03/15/22 2003)  BP: (!) 147/75 (03/15/22 2009)  SpO2: 97 % (03/15/22 2003)   Vital Signs (24h Range):  Temp:  [98.1 °F (36.7 °C)-99.3 °F (37.4 °C)] 98.1 °F (36.7 °C)  Pulse:  [] 91  Resp:  [14-23] 16  SpO2:  [94 %-97 %] 97 %  BP: (147-182)/(73-88) 147/75     Weight: 86.2 kg (190 lb)  Body mass index is 31.62 kg/m².    Physical Exam  Constitutional:       General: She is not in acute distress.     Appearance: She is not toxic-appearing or diaphoretic.   HENT:      Head: Normocephalic and atraumatic.      Nose: Nose normal.   Eyes:      General: No scleral icterus.     Extraocular Movements: Extraocular  movements intact.      Pupils: Pupils are equal, round, and reactive to light.   Cardiovascular:      Rate and Rhythm: Normal rate and regular rhythm.   Pulmonary:      Effort: Pulmonary effort is normal. No respiratory distress.      Breath sounds: No wheezing or rales.   Abdominal:      General: Abdomen is flat. There is no distension.      Palpations: Abdomen is soft.      Tenderness: There is no abdominal tenderness. There is no guarding.   Musculoskeletal:         General: Normal range of motion.      Cervical back: Normal range of motion and neck supple. No rigidity.      Right lower leg: No edema.      Left lower leg: No edema.   Skin:     General: Skin is warm and dry.   Neurological:      General: No focal deficit present.      Mental Status: She is alert and oriented to person, place, and time.      Cranial Nerves: No cranial nerve deficit.   Psychiatric:         Mood and Affect: Mood normal.         Behavior: Behavior normal.         CRANIAL NERVES     CN III, IV, VI   Pupils are equal, round, and reactive to light.     Significant Labs: All pertinent labs within the past 24 hours have been reviewed.  CBC:   Recent Labs   Lab 03/15/22  1649 03/15/22  1756 03/15/22  1836   WBC  --  10.47  --    HGB  --  12.7  --    HCT 44 42.3 42   PLT  --  287  --      CMP:   Recent Labs   Lab 03/15/22  1756   *   K 4.3   CL 93*   CO2 22*   *   BUN 7   CREATININE 0.8   CALCIUM 9.9   ANIONGAP 18*   EGFRNONAA >60.0     Cardiac Markers:   Recent Labs   Lab 03/15/22  1756   BNP <10     Lactic Acid: No results for input(s): LACTATE in the last 48 hours.  Urine Studies:   Recent Labs   Lab 03/15/22  1811   COLORU Colorless*   APPEARANCEUA Clear   PHUR 6.0   SPECGRAV >=1.030*   PROTEINUA Negative   GLUCUA 3+*   KETONESU Negative   BILIRUBINUA Negative   OCCULTUA Negative   NITRITE Negative   LEUKOCYTESUR Trace*   RBCUA 2   WBCUA 1   BACTERIA Rare       Significant Imaging: I have reviewed all pertinent imaging  results/findings within the past 24 hours.    Assessment/Plan:     * Hyperosmolar hyperglycemic state (HHS)  - patient with transient neurological symptoms in setting of BG>600 with improvement following insulin and IVFs. Osmo still pending but highly suspect this is HHS.  - continue insulin gtt  - accuchecks q1h  - sp 750ml bolus in ED  - start NS 150ml/hr for 10hrs  - neurochecks q4h  - Endocrinology consulted  ;  Appreciate recs  - closely monitor and hold fluids if developing symptoms of CHF  - further management pending clinical course and future study review      Mixed hyperlipidemia  - start statin      Double vision  - Neurovascular consulted  ;  Appreciate recs  - improved with IVFs and insulin ; felt likely due to HHS  - repeat CTH in 24hrs to rule out stroke  (patient reports AICD not compatible with MRI)  - see further management above      Chronic systolic congestive heart failure  - last ECHO 2016 with EF 30%  ;  Patient has AICD  - IVFs as above for HHS  - ECHO pending  - resume ASA  - start coreg 6.25mg BID  - start losartan 12.5mg daily  - start atorvastatin 40mg daily  - monitor tele  - strict I/Os and daily weights      Primary hypertension  - no meds for last year  - start coreg and losartan  - hydralazine prn      Diabetes mellitus type 2 in obese  - see HHS above      Chest pain  - patient reported chest pain on presentation ; resolved spontaneously  - initial trop wnl and EKG without ischemic changes  - monitor tele  - ECHO pending  - if further chest pain repeat EKG and trop        VTE Risk Mitigation (From admission, onward)         Ordered     heparin (porcine) injection 5,000 Units  Every 8 hours         03/15/22 2001     IP VTE HIGH RISK PATIENT  Once         03/15/22 2001     Place sequential compression device  Until discontinued         03/15/22 2001                   Jalil Medrano MD  Department of Hospital Medicine   [facility] - Emergency Dept

## 2022-03-16 NOTE — ASSESSMENT & PLAN NOTE
Last TTE in 2016 with EF 40%, mild LAE,mild SHIVA, hypokinetic septum and anterior wall   Repeat TTE with EF of 25% and severe LV global hypokinesis  Continue coreg and losartan

## 2022-03-16 NOTE — ASSESSMENT & PLAN NOTE
Key History and Diagnostic Findings  - Initially diagnosed with Type 2 diabetes mellitus: Since at least   - A1c greater than 14 on 03/15/2022, remote previous reading of 7.1 on 2016  - Patient reports no recent history of diabetes medications, was on metformin in the past but could not tolerate secondary to diarrhea  - Weight based dosin kg x 0.5 = 43 TDD x 0.5 = 21 basal / 21 prandial  - 1700/TDD = 40 (estimated insulin sensitivity factor)  - 450/TDD = 10.5 (estimated starting carb ratio for prandial dosing)  - Glucose Goals:  160-200mg/dL  - 24 hour glucose trend:  Controlled in high 100s on steady insulin drip rate after correction of extreme hyperglycemia    Plan  - Start transition insulin drip at 1.8 units/hour   - Start mealtime aspart 6 units with moderate correction scale  - If patient appetite improves by this evening and insulin requirements remains stable, will stop transition drip at that time and start subcutaneous insulin regimen  - Agree with atorvastatin 40 mg daily, patient should continue this after discharge  - Patient will require basal/bolus insulin regimen at discharge given her A1c greater than 14; this was discussed with patient who understands  - Patient may have component of gastroparesis, if nausea persists, primary team may consider using Reglan for symptomatic treatment  - Noted history of cardiomyopathy and echo with reduced EF in 2016; will await repeat echocardiogram to determine current EF as patient may benefit from SGLT2 inhibitor outpatient    - Hypoglycemia protocol in place  - If blood glucose greater than 300, please ask patient not to eat food or drink anything other than water until correctional insulin has brought it back below 250  - Please ensure patient receives their p.r.n. insulin aspart if they eat a snack with more than 30 g of carbohydrate    - Patient will require close outpatient follow-up with Endocrinology, we will set her up with fellows  discharge clinic appointment  - Outpatient, recommend checking C-peptide to assess pancreatic reserve, consider repeat trial of metformin, GLP 1 would be great for weight loss though unclear if patient would be able to tolerate with nausea, patient has history of cardiomyopathy with reduced ejection fraction in 2016 and may benefit from SGLT2 inhibitor, certainly recommend CGM while on MDI if insurance allows, will need urinalysis to assess for proteinuria and referral to Ophthalmology do an annual dilated eye exam    Teaching Service Information    Inpatient subcutaneous insulin dosing strategies:  - Calculate an initial total daily dose of insulin by multiplying patient  body weight in kg by 0.5 mg/kg (or 0.3 mg/kg if they are older than 70 years or have a serum creatinine greater than 2.0 [as insulin renal clearance is decreased])    - In most cases, institute a basal-bolus regimen with 50% of total daily dose basal insulin and 50% prandial insulin    - In non-ICU patients with diabetes already on insulin therapy at home, initially reduce their total daily dose by 20% to reduce hypoglycemia risk as their appetite is likely decreased substantially and their food intake is likely reduced compared to at home; then if needed can up-titrate insulin dose to meet glycemic targets above    - Discharge regimen generally depends on admission A1c. If A1c is less than 8.0, patient likely did not have significantly persistent hyperglycemia before admission and can likely be discharged on their original home regimen. If A1c is between 8.0 and 10.0, then restart outpatient oral agents and discharge on 50-80% inpatient basal dose. If A1c greater than 10.0, then restart outpatient oral agents and discharge on % inpatient basal dose. All should receive post-hospital follow-up within a few weeks with their pcp or endocrinologist.

## 2022-03-16 NOTE — HPI
This is a 46 year old female with a past medical history of HFrEF (AICD), LBBB, cardiomyopathy, DM, and HTN who presents to the ED with chief complaint of blurry vision, left sided weakness/numbness, and chest pain. She reports that symptoms started this afternoon at 1530. She has not been taking any medications for the past year due to financial hardship and not having PCP. Patient states that she has never been prescribed insulin for DM control. In the ED patient afebrile and hemodynamically stable saturating well on room air. Stroke code was activated and Neurovasc evaluated patient. CTA MP performed and negative for LVO, high grade stenosis, vascular malformation, and hemorrhage. Patient has a pacemaker in which she was told it was not MRI compatible. Patient BG on presentation >600, AG 18, pH 7.39, beta-hydroxybutyrate negative and no ketones in urine. Patient started on IVFs and insulin gtt with resolution of her neurological symptoms. She was admitted to the care of medicine for further evaluation and management of suspected HHS.

## 2022-03-16 NOTE — ASSESSMENT & PLAN NOTE
- patient with transient neurological symptoms in setting of BG>600 with improvement following insulin and IVFs. Osmo still pending but highly suspect this is HHS.  - sp 750ml bolus in ED  - BG improving, tolerating diet  - Insulin gtt transitioned to levemir and prandial insulin  - Endocrinology consulted  ;  Appreciate recs  - closely monitor and hold fluids if developing symptoms of CHF  - further management pending clinical course and future study review

## 2022-03-16 NOTE — ASSESSMENT & PLAN NOTE
- last ECHO 2016 with EF 30%  ;  Patient has AICD  - IVFs as above for HHS  - ECHO pending  - resume ASA  - start coreg 6.25mg BID  - start losartan 12.5mg daily  - start atorvastatin 40mg daily  - monitor tele  - strict I/Os and daily weights

## 2022-03-16 NOTE — PROGRESS NOTES
[facility] - Neurosurgery ([facility])  [facility] Medicine  Progress Note    Patient Name: Samaria Verma  MRN: [id number]  Patient Class: IP- Inpatient   Admission Date: 3/15/2022  Length of Stay: 0 days  Attending Physician: Damian Maxwell MD  Primary Care Provider: No primary care provider on file.        Subjective:     Principal Problem:Hyperosmolar hyperglycemic state (HHS)        HPI:  This is a 46 year old female with a past medical history of HFrEF (AICD), LBBB, cardiomyopathy, DM, and HTN who presents to the ED with chief complaint of blurry vision, left sided weakness/numbness, and chest pain. She reports that symptoms started this afternoon at 1530. She has not been taking any medications for the past year due to financial hardship and not having PCP. Patient states that she has never been prescribed insulin for DM control. In the ED patient afebrile and hemodynamically stable saturating well on room air. Stroke code was activated and Neurovasc evaluated patient. CTA MP performed and negative for LVO, high grade stenosis, vascular malformation, and hemorrhage. Patient has a pacemaker in which she was told it was not MRI compatible. Patient BG on presentation >600, AG 18, pH 7.39, beta-hydroxybutyrate negative and no ketones in urine. Patient started on IVFs and insulin gtt with resolution of her neurological symptoms. She was admitted to the care of medicine for further evaluation and management of suspected HHS.      Overview/Hospital Course:  Pt admitted to  team G, and was initiated on insulin gtt for HHS. BG gradually trended down, pt tolerating PO; insulin gtt discontinued and pt started on levemir with prandial insulin; endocrinology following. Electrolytes aggressively replaced.      Interval History: Pt seen and examined this morning on thomas PETERSON. Pt reports that she is feeling much better than from admission: she can now see clearly, appetite is improved, but she still is not yet back to her  baseline. Discussed aggressive electrolyte replacement in addition to starting her on an appropriate insulin regimen during this admission that she can continue at home upon discharge. Care plan reviewed. Otherwise, doing well and with no further complaints at this time.      Objective:     Vital Signs (Most Recent):  Temp: 98.6 °F (37 °C) (03/16/22 1156)  Pulse: 70 (03/16/22 1156)  Resp: 18 (03/16/22 1156)  BP: (!) 180/86 (03/16/22 1156)  SpO2: 98 % (03/16/22 1156)   Vital Signs (24h Range):  Temp:  [97.4 °F (36.3 °C)-99.3 °F (37.4 °C)] 98.6 °F (37 °C)  Pulse:  [] 70  Resp:  [14-23] 18  SpO2:  [90 %-98 %] 98 %  BP: (131-182)/(67-88) 180/86     Weight: 86.2 kg (190 lb)  Body mass index is 31.62 kg/m².    Intake/Output Summary (Last 24 hours) at 3/16/2022 1515  Last data filed at 3/16/2022 0516  Gross per 24 hour   Intake 767.07 ml   Output 600 ml   Net 167.07 ml        Physical Exam  Gen: in NAD, appears stated age; pt is obese  Neuro: AAOx4, CN2-12 grossly intact BL; motor, sensory, and strength grossly intact BL  HEENT: NTNC, EOMI, PERRLA, MMM; no thyromegaly or lymphadenopathy; no JVD appreciated  CVS: RRR, no m/r/g; S1/S2 auscultated with no S3 or S4; capillary refill < 2 sec  Resp: lungs CTAB, no w/r/r; no belabored breathing or accessory muscle use appreciated   Abd: BS+ in all 4 quadrants; NTND, soft to palpation; no organomegaly appreciated   Extrem: pulses full, equal, and regular over all 4 extremities; no UE or LE edema BL      Significant Labs: All pertinent labs within the past 24 hours have been reviewed.    Significant Imaging: I have reviewed all pertinent imaging results/findings within the past 24 hours.      Assessment/Plan:      * Hyperosmolar hyperglycemic state (HHS)  - patient with transient neurological symptoms in setting of BG>600 with improvement following insulin and IVFs. Osmo still pending but highly suspect this is HHS.  - sp 750ml bolus in ED  - BG improving, tolerating diet  -  Insulin gtt transitioned to levemir and prandial insulin  - Endocrinology consulted  ;  Appreciate recs  - closely monitor and hold fluids if developing symptoms of CHF  - further management pending clinical course and future study review      Mixed hyperlipidemia  - start statin      Chronic systolic congestive heart failure  - last ECHO 2016 with EF 30%  ;  Patient has AICD  - IVFs as above for HHS  - ECHO pending  - resume ASA  - start coreg 6.25mg BID  - start losartan 12.5mg daily  - start atorvastatin 40mg daily  - monitor tele  - strict I/Os and daily weights      Primary hypertension  - no meds for last year  - start coreg and losartan  - hydralazine prn      Diabetes mellitus type 2 in obese  - see HHS above        VTE Risk Mitigation (From admission, onward)         Ordered     heparin (porcine) injection 5,000 Units  Every 8 hours         03/15/22 2001     IP VTE HIGH RISK PATIENT  Once         03/15/22 2001     Place sequential compression device  Until discontinued         03/15/22 2001                Discharge Planning   CARLOS: 3/18/2022     Code Status: Full Code   Is the patient medically ready for discharge?: No    Reason for patient still in hospital (select all that apply): Patient trending condition  Discharge Plan A: Home Health          Damian Maxwell MD  Attending Physician  Department of Hospital Medicine  Epic secure chat preferred, or [employer]Link ext. [phone]  3/16/2022

## 2022-03-16 NOTE — CONSULTS
Christiano Dunn - Neurosurgery ([facility])  Endocrinology  Diabetes Consult Note    Consult Requested by: Damian Maxwell MD   Reason for admit: Hyperosmolar hyperglycemic state (HHS)    HISTORY OF PRESENT ILLNESS:  46 year old female with T2DM, HFrEF (AICD), LBBB, cardiomyopathy, and HTN who presents with blurry vision, left sided weakness/numbness, and chest pain since afternoon of admission. She has not been taking any medications for the past year due to financial hardship and not having PCP.     - In the ED patient afebrile and hemodynamically stable saturating well on room air. Stroke code was activated and Neurovasc evaluated patient. CTA MP performed and negative for LVO, high grade stenosis, vascular malformation, and hemorrhage. Patient has a pacemaker in which she was told it was not MRI compatible.   - Pertinent labs on presentation: glucose >600, AG 18, pH 7.39, beta-hydroxybutyrate negative and no ketones in urine. Patient started on IVFs and insulin gtt with resolution of her neurological symptoms.     - Endocrinology consulted for evaluation and management of HHS    Regarding Diabetes Mellitus:    - Initially diagnosed with Type 2 diabetes mellitus: Since at least 2015  - A1c greater than 14 on 03/15/2022, remote previous reading of 7.1 on 01/06/2016  - Patient reports having been on metformin in the past but could not tolerate secondary to diarrhea, has not been on any diabetes medications in several months    - Current symptoms:  Polyuria/polydipsia, visual changes, nausea/vomiting, numbness/tingling of the lower extremities more pronounced in the left  - Known diabetic complications: cardiovascular disease and cerebrovascular disease  - Cardiovascular risk factors: diabetes mellitus, dyslipidemia, obesity (BMI >= 30 kg/m2), and sedentary lifestyle      Interval HPI:   Overnight events:  No acute events reported overnight  Eating:   <25%  Nausea: Yes  Hypoglycemia and intervention: No  Fever: No  TPN  and/or TF: No  If yes, type of TF/TPN and rate: NA    PMH, PSH, FH, SH updated and reviewed     ROS:  Review of Systems   Constitutional:  Positive for fatigue.   Eyes:  Positive for visual disturbance.   Gastrointestinal:  Positive for nausea. Negative for abdominal pain.   Endocrine: Positive for polydipsia and polyuria.   Neurological:  Positive for numbness.     Current Medications and/or Treatments Impacting Glycemic Control  Immunotherapy:    Immunosuppressants       None          Steroids:   Hormones (From admission, onward)                Start     Stop Route Frequency Ordered    03/15/22 2100  melatonin tablet 6 mg         -- Oral Nightly PRN 03/15/22 2001          Pressors:    Autonomic Drugs (From admission, onward)                None          Hyperglycemia/Diabetes Medications:   Antihyperglycemics (From admission, onward)                Start     Stop Route Frequency Ordered    03/16/22 1130  insulin aspart U-100 pen 6 Units         -- SubQ 3 times daily with meals 03/16/22 0915    03/16/22 1015  insulin aspart U-100 pen 0-5 Units         -- SubQ As needed (PRN) 03/16/22 0915    03/16/22 1015  insulin regular in 0.9 % NaCl 100 unit/100 mL (1 unit/mL) infusion        Question:  Enter initial dose (Units/hr):  Answer:  1.8    -- IV Continuous 03/16/22 0915             PHYSICAL EXAMINATION:  Vitals:    03/16/22 0811   BP: 131/67   Pulse: 71   Resp: 17   Temp: 97.4 °F (36.3 °C)     Body mass index is 31.62 kg/m².    Physical Exam  Constitutional:       Appearance: Normal appearance.   Eyes:      Extraocular Movements: Extraocular movements intact.      Conjunctiva/sclera: Conjunctivae normal.   Cardiovascular:      Rate and Rhythm: Normal rate and regular rhythm.      Pulses: Normal pulses.      Heart sounds: Normal heart sounds.   Pulmonary:      Effort: Pulmonary effort is normal.      Breath sounds: Normal breath sounds.   Abdominal:      General: Abdomen is flat.      Palpations: Abdomen is soft.    Musculoskeletal:      Comments: No lower extremity swelling, no overt abrasions or breaks in skin   Neurological:      Mental Status: She is alert and oriented to person, place, and time.      Sensory: Sensory deficit present.   Psychiatric:         Mood and Affect: Mood normal.         Behavior: Behavior normal.         Labs Reviewed and Include   Recent Labs   Lab 22  0712   *   CALCIUM 5.8*   ALBUMIN 2.0*   PROT 4.1*      K 2.3*   CO2 16*   *   BUN 7   CREATININE 0.4*   ALKPHOS 60   AST 13   BILITOT 0.2     Lab Results   Component Value Date    WBC 9.05 2022    HGB 11.4 (L) 2022    HCT 36.9 (L) 2022    MCV 81 (L) 2022     2022     No results for input(s): TSH, FREET4 in the last 168 hours.  Lab Results   Component Value Date    HGBA1C >14.0 (H) 03/15/2022       Nutritional status:   Body mass index is 31.62 kg/m².  Lab Results   Component Value Date    ALBUMIN 2.0 (L) 2022    ALBUMIN 3.9 2017    ALBUMIN 4.2 2016     No results found for: PREALBUMIN    Estimated Creatinine Clearance: 188.6 mL/min (A) (based on SCr of 0.4 mg/dL (L)).    Accu-Checks  Recent Labs     03/15/22  2204 03/15/22  2305 22  0008 22  0105 22  0205 22  0308 22  0407 22  0514 22  0610 22  0708   POCTGLUCOSE 314* 288* 278* 270* 221* 248* 245* 241* 238* 237*        ASSESSMENT and PLAN    * Hyperosmolar hyperglycemic state (HHS)  Key History and Diagnostic Findings  - Initially diagnosed with Type 2 diabetes mellitus: Since at least   - A1c greater than 14 on 03/15/2022, remote previous reading of 7.1 on 2016  - Patient reports no recent history of diabetes medications, was on metformin in the past but could not tolerate secondary to diarrhea  - Weight based dosin kg x 0.5 = 43 TDD x 0.5 = 21 basal / 21 prandial  - 1700/TDD = 40 (estimated insulin sensitivity factor)  - 450/TDD = 10.5 (estimated  starting carb ratio for prandial dosing)  - Glucose Goals:  160-200mg/dL  - 24 hour glucose trend:  Controlled in high 100s on steady insulin drip rate after correction of extreme hyperglycemia    Plan  - Start transition insulin drip at 1.8 units/hour   - Start mealtime aspart 6 units with moderate correction scale  - If patient appetite improves by this evening and insulin requirements remains stable, will stop transition drip at that time and start subcutaneous basal insulin regimen with Levemir 10 units twice daily  - Agree with atorvastatin 40 mg daily, patient should continue this after discharge  - Patient will require basal/bolus insulin regimen at discharge given her A1c greater than 14; this was discussed with patient who understands  - Patient may have component of gastroparesis, if nausea persists, primary team may consider using Reglan for symptomatic treatment  - Noted history of cardiomyopathy and echo with reduced EF in 2016; will await repeat echocardiogram to determine current EF as patient may benefit from SGLT2 inhibitor outpatient    - Hypoglycemia protocol in place  - If blood glucose greater than 300, please ask patient not to eat food or drink anything other than water until correctional insulin has brought it back below 250  - Please ensure patient receives their p.r.n. insulin aspart if they eat a snack with more than 30 g of carbohydrate    - Patient will require close outpatient follow-up with Endocrinology, we will set her up with fellows discharge clinic appointment  - Outpatient, recommend checking C-peptide to assess pancreatic reserve, consider repeat trial of metformin, GLP 1 would be great for weight loss though unclear if patient would be able to tolerate with nausea, patient has history of cardiomyopathy with reduced ejection fraction in 2016 and may benefit from SGLT2 inhibitor, certainly recommend CGM while on MDI if insurance allows, will need urinalysis to assess for  proteinuria and referral to Ophthalmology do an annual dilated eye exam    Teaching Service Information    Inpatient subcutaneous insulin dosing strategies:  - Calculate an initial total daily dose of insulin by multiplying patient  body weight in kg by 0.5 mg/kg (or 0.3 mg/kg if they are older than 70 years or have a serum creatinine greater than 2.0 [as insulin renal clearance is decreased])    - In most cases, institute a basal-bolus regimen with 50% of total daily dose basal insulin and 50% prandial insulin    - In non-ICU patients with diabetes already on insulin therapy at home, initially reduce their total daily dose by 20% to reduce hypoglycemia risk as their appetite is likely decreased substantially and their food intake is likely reduced compared to at home; then if needed can up-titrate insulin dose to meet glycemic targets above    - Discharge regimen generally depends on admission A1c. If A1c is less than 8.0, patient likely did not have significantly persistent hyperglycemia before admission and can likely be discharged on their original home regimen. If A1c is between 8.0 and 10.0, then restart outpatient oral agents and discharge on 50-80% inpatient basal dose. If A1c greater than 10.0, then restart outpatient oral agents and discharge on % inpatient basal dose. All should receive post-hospital follow-up within a few weeks with their pcp or endocrinologist.    Diabetes mellitus type 2 in obese  - Please see plan as above    Mixed hyperlipidemia  Key History and Diagnostic Findings  -  with elevated triglycerides of 438 on 02/19/2021     Plan  - Agree with atorvastatin 40 mg daily per neurology  - Recheck lipid panel in 3 months after continued statin use    Chronic systolic congestive heart failure  - Please see plan as above        Plan discussed with patient and RN at bedside.     Parker Main DO  [facility], 6th Floor  [address] [place], [address]    Office: [phone]  Fax: [phone]    Disclaimer: This note has been generated using voice-recognition software. There may be typographical errors that have been missed during proof-reading.    The above history labs imaging impression and plan were discussed with attending physician who is in agreement and also took part in this patient's care.  I personally reviewed all of the patients available medications, labs, imaging, vitals, allergies, medical history.

## 2022-03-16 NOTE — SUBJECTIVE & OBJECTIVE
Past Medical History:   Diagnosis Date    Anticoagulant long-term use     CHF (congestive heart failure)     Chronic systolic congestive heart failure 3/7/2017    Diabetes mellitus     Diabetes mellitus type 2 in obese 1/6/2016    Hypertension     Primary hypertension 1/6/2016       Past Surgical History:   Procedure Laterality Date    BREAST SURGERY Bilateral     reduction    CARDIAC DEFIBRILLATOR PLACEMENT  2018    INSERTION OF PACEMAKER  2018    TUBAL LIGATION         Review of patient's allergies indicates:   Allergen Reactions    Lisinopril Rash       No current facility-administered medications on file prior to encounter.     Current Outpatient Medications on File Prior to Encounter   Medication Sig    albuterol (PROVENTIL/VENTOLIN HFA) 90 mcg/actuation inhaler Inhale 2 puffs into the lungs every 6 (six) hours as needed. Rescue    aspirin (ECOTRIN) 81 MG EC tablet Take 81 mg by mouth once daily.    azelastine (ASTELIN) 137 mcg (0.1 %) nasal spray 1 spray (137 mcg total) by Nasal route 2 (two) times daily.    carvedilol (COREG) 25 MG tablet Take 1 tablet (25 mg total) by mouth 2 (two) times daily.    clonazePAM (KLONOPIN) 0.5 MG tablet Take 0.5 mg by mouth 2 (two) times daily as needed for Anxiety.    ergocalciferol (ERGOCALCIFEROL) 50,000 unit Cap Take 50,000 Units by mouth every 7 days.    fluticasone propionate (FLONASE) 50 mcg/actuation nasal spray 1 spray (50 mcg total) by Each Nostril route once daily.    fluticasone propionate (FLONASE) 50 mcg/actuation nasal spray 1 spray (50 mcg total) by Each Nostril route once daily.    furosemide (LASIX) 80 MG tablet Take 0.5 tablets (40 mg total) by mouth 2 (two) times daily. (Patient taking differently: Take 80 mg by mouth once daily. )    gabapentin (NEURONTIN) 300 MG capsule Take 1 capsule (300 mg total) by mouth 2 (two) times daily.    metformin (GLUCOPHAGE) 1000 MG tablet Take 1 tablet (1,000 mg total) by mouth 2 (two) times daily with  meals.    nitroGLYCERIN (NITROSTAT) 0.4 MG SL tablet Place 1 tablet (0.4 mg total) under the tongue every 5 (five) minutes as needed for Chest pain.    sacubitriL-valsartan (ENTRESTO) 24-26 mg per tablet Take 1 tablet by mouth 2 (two) times daily.    spironolactone (ALDACTONE) 50 MG tablet Take 50 mg by mouth once daily.     Family History       Problem Relation (Age of Onset)    Arthritis Mother    Asthma Son    Heart disease Mother, Father, Paternal Grandmother    Hypertension Father    Pulmonary embolism           Tobacco Use    Smoking status: Never Smoker    Smokeless tobacco: Never Used   Substance and Sexual Activity    Alcohol use: No    Drug use: No    Sexual activity: Yes     Partners: Male     Review of Systems   Constitutional:  Negative for chills, fatigue and fever.   HENT:  Negative for sore throat and trouble swallowing.    Eyes:  Positive for visual disturbance. Negative for photophobia.   Respiratory:  Negative for cough, shortness of breath and wheezing.    Cardiovascular:  Positive for chest pain. Negative for palpitations and leg swelling.   Gastrointestinal:  Negative for abdominal distention, abdominal pain, diarrhea, nausea and vomiting.   Genitourinary:  Negative for dysuria and hematuria.   Musculoskeletal:  Negative for neck pain and neck stiffness.   Skin:  Negative for rash and wound.   Neurological:  Positive for weakness (Lt sided) and numbness (Lt sided). Negative for seizures, syncope, light-headedness and headaches.   Psychiatric/Behavioral:  Negative for confusion and decreased concentration.    Objective:     Vital Signs (Most Recent):  Temp: 98.1 °F (36.7 °C) (03/15/22 2003)  Pulse: 91 (03/15/22 2003)  Resp: 16 (03/15/22 2003)  BP: (!) 147/75 (03/15/22 2009)  SpO2: 97 % (03/15/22 2003)   Vital Signs (24h Range):  Temp:  [98.1 °F (36.7 °C)-99.3 °F (37.4 °C)] 98.1 °F (36.7 °C)  Pulse:  [] 91  Resp:  [14-23] 16  SpO2:  [94 %-97 %] 97 %  BP: (147-182)/(73-88) 147/75      Weight: 86.2 kg (190 lb)  Body mass index is 31.62 kg/m².    Physical Exam  Constitutional:       General: She is not in acute distress.     Appearance: She is not toxic-appearing or diaphoretic.   HENT:      Head: Normocephalic and atraumatic.      Nose: Nose normal.   Eyes:      General: No scleral icterus.     Extraocular Movements: Extraocular movements intact.      Pupils: Pupils are equal, round, and reactive to light.   Cardiovascular:      Rate and Rhythm: Normal rate and regular rhythm.   Pulmonary:      Effort: Pulmonary effort is normal. No respiratory distress.      Breath sounds: No wheezing or rales.   Abdominal:      General: Abdomen is flat. There is no distension.      Palpations: Abdomen is soft.      Tenderness: There is no abdominal tenderness. There is no guarding.   Musculoskeletal:         General: Normal range of motion.      Cervical back: Normal range of motion and neck supple. No rigidity.      Right lower leg: No edema.      Left lower leg: No edema.   Skin:     General: Skin is warm and dry.   Neurological:      General: No focal deficit present.      Mental Status: She is alert and oriented to person, place, and time.      Cranial Nerves: No cranial nerve deficit.   Psychiatric:         Mood and Affect: Mood normal.         Behavior: Behavior normal.         CRANIAL NERVES     CN III, IV, VI   Pupils are equal, round, and reactive to light.     Significant Labs: All pertinent labs within the past 24 hours have been reviewed.  CBC:   Recent Labs   Lab 03/15/22  1649 03/15/22  1756 03/15/22  1836   WBC  --  10.47  --    HGB  --  12.7  --    HCT 44 42.3 42   PLT  --  287  --      CMP:   Recent Labs   Lab 03/15/22  1756   *   K 4.3   CL 93*   CO2 22*   *   BUN 7   CREATININE 0.8   CALCIUM 9.9   ANIONGAP 18*   EGFRNONAA >60.0     Cardiac Markers:   Recent Labs   Lab 03/15/22  1756   BNP <10     Lactic Acid: No results for input(s): LACTATE in the last 48 hours.  Urine  Studies:   Recent Labs   Lab 03/15/22  1811   COLORU Colorless*   APPEARANCEUA Clear   PHUR 6.0   SPECGRAV >=1.030*   PROTEINUA Negative   GLUCUA 3+*   KETONESU Negative   BILIRUBINUA Negative   OCCULTUA Negative   NITRITE Negative   LEUKOCYTESUR Trace*   RBCUA 2   WBCUA 1   BACTERIA Rare       Significant Imaging: I have reviewed all pertinent imaging results/findings within the past 24 hours.

## 2022-03-16 NOTE — HOSPITAL COURSE
Pt admitted to  team G, and was initiated on insulin gtt for HHS. BG gradually trended down, pt tolerating PO; insulin gtt discontinued and pt started on levemir with prandial insulin; endocrinology following. Electrolytes aggressively replaced. Overall she improved. Now stable for discharge as per endocrine's outlined insulin regimen. See below. 12 units BID lantus with novolog 6-8 units with MDSSI AC HS. She will need to follow up with endocrine OP. She has missed all her cardiac meds for the past year or 2 as per patient. Cardiology referral placed as well. Refills some of her cardioprudent meds.

## 2022-03-16 NOTE — SUBJECTIVE & OBJECTIVE
Neurologic Chief Complaint: blurry vision, diplopia, L sided numbness, L sided weakness    Subjective:     Interval History: Patient is seen for follow-up neurological assessment and treatment recommendations: patient admitted to  for [facility], neurologic symptoms from yesterday improved with improvement of blood glucose, low suspicion for cerebrovascular origin for patient's symptoms. Will follow up on repeat CT    HPI, Past Medical, Family, and Social History remains the same as documented in the initial encounter.     Review of Systems   Constitutional:  Negative for fever.   HENT:  Negative for drooling and trouble swallowing.    Eyes:  Positive for visual disturbance.   Respiratory:  Negative for cough.    Cardiovascular:  Negative for chest pain.   Gastrointestinal:  Negative for nausea and vomiting.   Genitourinary:  Negative for dysuria.   Musculoskeletal:  Negative for arthralgias and myalgias.   Skin:  Negative for rash.   Neurological:  Negative for facial asymmetry, speech difficulty, weakness and numbness.   Psychiatric/Behavioral:  Negative for agitation.    Scheduled Meds:   aspirin  81 mg Oral Daily    atorvastatin  40 mg Oral Daily    carvediloL  6.25 mg Oral BID    heparin (porcine)  5,000 Units Subcutaneous Q8H    insulin aspart U-100  6 Units Subcutaneous TIDWM    insulin detemir U-100  10 Units Subcutaneous QHS    losartan  12.5 mg Oral Daily    polyethylene glycol  17 g Oral Daily    potassium bicarbonate  50 mEq Oral BID AC     Continuous Infusions:  PRN Meds:acetaminophen, albuterol-ipratropium, aluminum-magnesium hydroxide-simethicone, dextrose 10%, dextrose 10%, glucagon (human recombinant), glucose, glucose, hydrALAZINE, hydrOXYzine HCL, melatonin, naloxone, ondansetron, sodium chloride 0.9%    Objective:     Vital Signs (Most Recent):  Temp: 98.6 °F (37 °C) (03/16/22 1517)  Pulse: 89 (03/16/22 1517)  Resp: 17 (03/16/22 1517)  BP: (!) 147/88 (03/16/22 1517)  SpO2: 99 % (03/16/22 1517)  BP  Location: Left arm    Vital Signs Range (Last 24H):  Temp:  [97.4 °F (36.3 °C)-99.3 °F (37.4 °C)]   Pulse:  []   Resp:  [14-23]   BP: (131-182)/(67-88)   SpO2:  [90 %-99 %]   BP Location: Left arm    Physical Exam  Vitals reviewed.   Constitutional:       General: She is not in acute distress.     Appearance: She is well-developed.   HENT:      Head: Normocephalic and atraumatic.      Right Ear: External ear normal.      Left Ear: External ear normal.      Nose: Nose normal.   Eyes:      General: No scleral icterus.     Pupils: Pupils are equal, round, and reactive to light.   Cardiovascular:      Rate and Rhythm: Normal rate.   Pulmonary:      Effort: Pulmonary effort is normal. No respiratory distress.   Musculoskeletal:         General: Normal range of motion.      Cervical back: Normal range of motion.      Right lower leg: No edema.      Left lower leg: No edema.   Skin:     General: Skin is warm and dry.   Neurological:      Mental Status: She is alert.   Psychiatric:         Mood and Affect: Mood normal.         Behavior: Behavior normal.       Neurological Exam:   LOC: alert  Attention Span: Good   Language: No aphasia  Articulation: No dysarthria  Orientation: Person, Place, Time   Visual Fields: Full  EOM (CN III, IV, VI): Full/intact  Facial Movement (CN VII): Symmetric facial expression    Motor: Arm left  Normal 5/5  Leg left  Normal 5/5  Arm right  Normal 5/5  Leg right Normal 5/5  Cerebellum: No evidence of appendicular or axial ataxia  Sensation: Intact to light touch, temperature and vibration  Tone: Normal tone throughout    Laboratory:  CMP:   Recent Labs   Lab 03/16/22  0712   CALCIUM 5.8*   ALBUMIN 2.0*   PROT 4.1*      K 2.3*   CO2 16*   *   BUN 7   CREATININE 0.4*   ALKPHOS 60   ALT 18   AST 13   BILITOT 0.2     CBC:   Recent Labs   Lab 03/16/22  0839   WBC 9.05   RBC 4.55   HGB 11.4*   HCT 36.9*      MCV 81*   MCH 25.1*   MCHC 30.9*     Lipid Panel: No results for  input(s): CHOL, LDLCALC, HDL, TRIG in the last 168 hours.  Coagulation: No results for input(s): PT, INR, APTT in the last 168 hours.  Platelet Aggregation Study: No results for input(s): PLTAGG, PLTAGINTERP, PLTAGREGLACO, ADPPLTAGGREG in the last 168 hours.  Hgb A1C:   Recent Labs   Lab 03/15/22  1756   HGBA1C >14.0*     TSH: No results for input(s): TSH in the last 168 hours.    Diagnostic Results     Brain Imaging   Repeat CT pending    Vessel Imaging   CTA MP 3/15/22   No acute intracranial process.  Chronic small right cerebellar infarct.     No significant stenosis at the carotid bifurcations by NASCET criteria.  The vertebral arteries are patent without advanced stenosis.     No major branch stenosis/occlusion intracranially.        Cardiac Imaging   TTE 3/16/22  The left ventricle is normal in size with severely decreased systolic function. The estimated ejection fraction is 25%.  There is severe left ventricular global hypokinesis.  Normal right ventricular size with normal right ventricular systolic function.  Left ventricular diastolic dysfunction.  Mild left atrial enlargement.  Normal central venous pressure (3 mmHg).

## 2022-03-16 NOTE — ASSESSMENT & PLAN NOTE
Key History and Diagnostic Findings  -  with elevated triglycerides of 438 on 02/19/2021     Plan  - Agree with atorvastatin 40 mg daily per neurology  - Recheck lipid panel in 3 months after continued statin use

## 2022-03-16 NOTE — PLAN OF CARE
Problem: Adult Inpatient Plan of Care  Goal: Plan of Care Review  Outcome: Ongoing, Progressing  Goal: Optimal Comfort and Wellbeing  Outcome: Ongoing, Progressing     Problem: Diabetes Comorbidity  Goal: Blood Glucose Level Within Targeted Range  Outcome: Ongoing, Progressing    POC reviewed with patient. Patient verbalized understanding of POC. All comments and concerns addressed. Pt off insulin drip, BG checked Q4. Scheduled insulin given. Head CT completed. OOB  independently. No complaints of pain at this time. Bed locked and low. Call light within reach. Safety precautions maintained.

## 2022-03-16 NOTE — ASSESSMENT & PLAN NOTE
- patient reported chest pain on presentation ; resolved spontaneously  - initial trop wnl and EKG without ischemic changes  - monitor tele  - ECHO pending  - if further chest pain repeat EKG and trop

## 2022-03-16 NOTE — ASSESSMENT & PLAN NOTE
- patient with transient neurological symptoms in setting of BG>600 with improvement following insulin and IVFs. Osmo still pending but highly suspect this is HHS.  - continue insulin gtt  - accuchecks q1h  - sp 750ml bolus in ED  - start NS 150ml/hr for 10hrs  - neurochecks q4h  - Endocrinology consulted  ;  Appreciate recs  - closely monitor and hold fluids if developing symptoms of CHF  - further management pending clinical course and future study review

## 2022-03-16 NOTE — SUBJECTIVE & OBJECTIVE
Interval HPI:   Overnight events:  No acute events reported overnight  Eating:   <25%  Nausea: Yes  Hypoglycemia and intervention: No  Fever: No  TPN and/or TF: No  If yes, type of TF/TPN and rate: NA    PMH, PSH, FH, SH updated and reviewed     ROS:  Review of Systems   Constitutional:  Positive for fatigue.   Eyes:  Positive for visual disturbance.   Gastrointestinal:  Positive for nausea. Negative for abdominal pain.   Endocrine: Positive for polydipsia and polyuria.   Neurological:  Positive for numbness.     Current Medications and/or Treatments Impacting Glycemic Control  Immunotherapy:    Immunosuppressants       None          Steroids:   Hormones (From admission, onward)                Start     Stop Route Frequency Ordered    03/15/22 2100  melatonin tablet 6 mg         -- Oral Nightly PRN 03/15/22 2001          Pressors:    Autonomic Drugs (From admission, onward)                None          Hyperglycemia/Diabetes Medications:   Antihyperglycemics (From admission, onward)                Start     Stop Route Frequency Ordered    03/16/22 1130  insulin aspart U-100 pen 6 Units         -- SubQ 3 times daily with meals 03/16/22 0915    03/16/22 1015  insulin aspart U-100 pen 0-5 Units         -- SubQ As needed (PRN) 03/16/22 0915    03/16/22 1015  insulin regular in 0.9 % NaCl 100 unit/100 mL (1 unit/mL) infusion        Question:  Enter initial dose (Units/hr):  Answer:  1.8    -- IV Continuous 03/16/22 0915             PHYSICAL EXAMINATION:  Vitals:    03/16/22 0811   BP: 131/67   Pulse: 71   Resp: 17   Temp: 97.4 °F (36.3 °C)     Body mass index is 31.62 kg/m².    Physical Exam  Constitutional:       Appearance: Normal appearance.   Eyes:      Extraocular Movements: Extraocular movements intact.      Conjunctiva/sclera: Conjunctivae normal.   Cardiovascular:      Rate and Rhythm: Normal rate and regular rhythm.      Pulses: Normal pulses.      Heart sounds: Normal heart sounds.   Pulmonary:      Effort:  Pulmonary effort is normal.      Breath sounds: Normal breath sounds.   Abdominal:      General: Abdomen is flat.      Palpations: Abdomen is soft.   Musculoskeletal:      Comments: No lower extremity swelling, no overt abrasions or breaks in skin   Neurological:      Mental Status: She is alert and oriented to person, place, and time.      Sensory: Sensory deficit present.   Psychiatric:         Mood and Affect: Mood normal.         Behavior: Behavior normal.

## 2022-03-16 NOTE — HOSPITAL COURSE
03/16/2022 patient admitted to  for HHS, neurologic symptoms from yesterday improved with improvement of blood glucose, low suspicion for cerebrovascular origin for patient's symptoms. Will follow up on repeat CT

## 2022-03-16 NOTE — ASSESSMENT & PLAN NOTE
Samaria Verma is a 46 y.o. female with PMHx of CHF (AICD), LBBB, cardiomyopathy, DM, and HTN who presented with chest pain, blurry vision, left sided numbness, and double vision. Stroke code called. Blurry vision is chronic and has not sought out care for this issue. Reports medication noncompliance due to financial hardship and does not have a PCP.   Stroke provider who initially evaluated patient in ED noted evidence of functional exam findings. Patient reported that she could not move left arm, however on distraction patient moved hand and was able to maintain arm above her head with out dropping it. Noted increased tone on the left arm, mild left facial weakness, and fluctuating visual fields defect. Patient was noted to be hyperglycemic with blood glucose >600 and hyponatremic.CTA MP negative for LVO, high grade stenosis, vascular malformation, and hemorrhage. Patient has a pacemaker in which she was told it was not MRI compatible.     She was not a candidate for TPA as there was stroke evidence of stroke mimic with blood glucose >600 and functional exam. She was admitted to hospital medicine for HHS. Exam improved with improvement in blood glucose. There is low suspicion for cerebrovascular origin for patient's symptoms. Stroke team will follow up on repeat CT.

## 2022-03-16 NOTE — HPI
46 year old female with T2DM, HFrEF (AICD), LBBB, cardiomyopathy, and HTN who presents with blurry vision, left sided weakness/numbness, and chest pain since afternoon of admission. She has not been taking any medications for the past year due to financial hardship and not having PCP.     - In the ED patient afebrile and hemodynamically stable saturating well on room air. Stroke code was activated and Neurovasc evaluated patient. CTA MP performed and negative for LVO, high grade stenosis, vascular malformation, and hemorrhage. Patient has a pacemaker in which she was told it was not MRI compatible.   - Pertinent labs on presentation: glucose >600, AG 18, pH 7.39, beta-hydroxybutyrate negative and no ketones in urine. Patient started on IVFs and insulin gtt with resolution of her neurological symptoms.     - Endocrinology consulted for evaluation and management of HHS    Regarding Diabetes Mellitus:    - Initially diagnosed with Type 2 diabetes mellitus: Since at least 2015  - A1c greater than 14 on 03/15/2022, remote previous reading of 7.1 on 01/06/2016  - Patient reports having been on metformin in the past but could not tolerate secondary to diarrhea, has not been on any diabetes medications in several months    - Current symptoms:  Polyuria/polydipsia, visual changes, nausea/vomiting, numbness/tingling of the lower extremities more pronounced in the left  - Known diabetic complications: cardiovascular disease and cerebrovascular disease  - Cardiovascular risk factors: diabetes mellitus, dyslipidemia, obesity (BMI >= 30 kg/m2), and sedentary lifestyle   Bcc Infiltrative Histology Text: There were numerous aggregates of basaloid cells demonstrating an infiltrative pattern.

## 2022-03-16 NOTE — SUBJECTIVE & OBJECTIVE
Interval History: Pt seen and examined this morning on thomas PETERSON. Pt reports that she is feeling much better than from admission: she can now see clearly, appetite is improved, but she still is not yet back to her baseline. Discussed aggressive electrolyte replacement in addition to starting her on an appropriate insulin regimen during this admission that she can continue at home upon discharge. Care plan reviewed. Otherwise, doing well and with no further complaints at this time.      Objective:     Vital Signs (Most Recent):  Temp: 98.6 °F (37 °C) (03/16/22 1156)  Pulse: 70 (03/16/22 1156)  Resp: 18 (03/16/22 1156)  BP: (!) 180/86 (03/16/22 1156)  SpO2: 98 % (03/16/22 1156)   Vital Signs (24h Range):  Temp:  [97.4 °F (36.3 °C)-99.3 °F (37.4 °C)] 98.6 °F (37 °C)  Pulse:  [] 70  Resp:  [14-23] 18  SpO2:  [90 %-98 %] 98 %  BP: (131-182)/(67-88) 180/86     Weight: 86.2 kg (190 lb)  Body mass index is 31.62 kg/m².    Intake/Output Summary (Last 24 hours) at 3/16/2022 1515  Last data filed at 3/16/2022 0516  Gross per 24 hour   Intake 767.07 ml   Output 600 ml   Net 167.07 ml        Physical Exam  Gen: in NAD, appears stated age; pt is obese  Neuro: AAOx4, CN2-12 grossly intact BL; motor, sensory, and strength grossly intact BL  HEENT: NTNC, EOMI, PERRLA, MMM; no thyromegaly or lymphadenopathy; no JVD appreciated  CVS: RRR, no m/r/g; S1/S2 auscultated with no S3 or S4; capillary refill < 2 sec  Resp: lungs CTAB, no w/r/r; no belabored breathing or accessory muscle use appreciated   Abd: BS+ in all 4 quadrants; NTND, soft to palpation; no organomegaly appreciated   Extrem: pulses full, equal, and regular over all 4 extremities; no UE or LE edema BL      Significant Labs: All pertinent labs within the past 24 hours have been reviewed.    Significant Imaging: I have reviewed all pertinent imaging results/findings within the past 24 hours.

## 2022-03-16 NOTE — NURSING
Patient admitted to room 926 alert and oriented x4, no distress noted, pt c/o 3/10 pain to mid right side chest radiating to right back, VSS, Respiratory status intact, pt on room air, Per-wic in place to suction, n redness or edema noted. Pt on insulin drip rate at 2.1 units/per hr, blood glucose taken 2200 noted at 314, rated adjusted per protocol increase by 0.6 to 2.7 units/hr. Oriented pt to room and call light system. Educated patient to importance of using the call light system with any wants or needs, Updated pt to POC, pt verbalized understanding.

## 2022-03-16 NOTE — PLAN OF CARE
"Christiano Dunn - Neurosurgery ([facility])  Initial Discharge Assessment       Primary Care Provider: No primary care provider on file.    Admission Diagnosis: Diplopia [H53.2]  CHF (congestive heart failure) [I50.9]  Chest discomfort [R07.89]  Hyperglycemia [R73.9]  Blurry vision, left eye [H53.8]  Paresthesia of left arm and leg [R20.2]  Chest pain [R07.9]  Numbness and tingling of left side of face [R20.0, R20.2]    Admission Date: 3/15/2022  Expected Discharge Date:     SW met with patient at bedside for Discharge Planning Assessment.  Per patient, patient lives with her , 3 children and one grandchild in a single story home with no step(s) to enter.   Per patient, patient was mostly independent with ADLs (pt reported that she "sometimes" needs assistance while walking if she is experiencing weakness) and used no dme for ambulation prior to admit. Pt reported that she is diabetic and monitors her blood glucose at home. Patient will have assistance from /family upon discharge.  Patient does not attend a coumadin clinic and is not on dialysis.     Discharge Planning Booklet given to patient and discussed.  All questions addressed.     SW/CM to follow and assist with d/c needs as needed.    Discharge Barriers Identified: None    Payor: MEDICAID / Plan: [facility] COMMUNITY PLAN [facility] ([place] MEDICAID) / Product Type: Managed Medicaid /  (pt reported that she currently has a Medicare plan through [facility]. SW to email admitting on 3/17 if not updated by that time)    Extended Emergency Contact Information  Primary Emergency Contact: Krish Verma   [place]  Mobile Phone: [phone]  Relation: Spouse    Discharge Plan A: Home Health  Discharge Plan B: Home with family      [facility] [address] - [place], [place] - [address]   [facility] [address]  Phone: [phone] Fax: [phone]    [facility] [address] [facility] [address] [place] [address]  Phone: " "[phone] Fax: [phone]      Initial Assessment (most recent)     Adult Discharge Assessment - 03/16/22 1431        Discharge Assessment    Assessment Type Discharge Planning Assessment     Confirmed/corrected address, phone number and insurance Yes     Confirmed Demographics Correct on Facesheet     Source of Information patient     Communicated CARLOS with patient/caregiver Date not available/Unable to determine     Reason For Admission Hyperosmolar hyperglycemic state (HHS)     Lives With child(sharon), adult;child(sharon), dependent;spouse;grandchild(sharon)     Facility Arrived From: home     Do you have help at home or someone to help you manage your care at home? Yes     Who are your caregiver(s) and their phone number(s)?  Krish [phone]     Prior to hospitilization cognitive status: Unable to Assess     Current cognitive status: Alert/Oriented     Walking or Climbing Stairs Difficulty ambulation difficulty, assistance 1 person     Mobility Management pt reported that she "sometimes" has trouble walking due to weakness but does not use DME     Dressing/Bathing Difficulty none     Home Accessibility wheelchair accessible     Home Layout Able to live on 1st floor     Equipment Currently Used at Home glucometer     Do you currently have service(s) that help you manage your care at home? No     Who is going to help you get home at discharge? /family     How do you get to doctors appointments? family or friend will provide     Are you on dialysis? No     Do you take coumadin? No     Discharge Plan A Home Health     Discharge Plan B Home with family     Discharge Plan discussed with: Patient     Discharge Barriers Identified None        Relationship/Environment    Name(s) of Who Lives With Patient  Krish, 3 children and one grandchild                      [place], [place]  [id number]    "

## 2022-03-17 PROBLEM — E87.1 HYPONATREMIA: Status: RESOLVED | Noted: 2022-03-15 | Resolved: 2022-03-17

## 2022-03-17 PROBLEM — E11.00 HYPEROSMOLAR HYPERGLYCEMIC STATE (HHS): Status: RESOLVED | Noted: 2022-03-15 | Resolved: 2022-03-17

## 2022-03-17 LAB
ALBUMIN SERPL BCP-MCNC: 3.2 G/DL (ref 3.5–5.2)
ALP SERPL-CCNC: 99 U/L (ref 55–135)
ALT SERPL W/O P-5'-P-CCNC: 30 U/L (ref 10–44)
ANION GAP SERPL CALC-SCNC: 12 MMOL/L (ref 8–16)
AST SERPL-CCNC: 23 U/L (ref 10–40)
BASOPHILS # BLD AUTO: 0.05 K/UL (ref 0–0.2)
BASOPHILS NFR BLD: 0.6 % (ref 0–1.9)
BILIRUB SERPL-MCNC: 0.3 MG/DL (ref 0.1–1)
BUN SERPL-MCNC: 7 MG/DL (ref 6–20)
CALCIUM SERPL-MCNC: 9.4 MG/DL (ref 8.7–10.5)
CHLORIDE SERPL-SCNC: 99 MMOL/L (ref 95–110)
CO2 SERPL-SCNC: 22 MMOL/L (ref 23–29)
CREAT SERPL-MCNC: 0.6 MG/DL (ref 0.5–1.4)
DIFFERENTIAL METHOD: ABNORMAL
EOSINOPHIL # BLD AUTO: 0.3 K/UL (ref 0–0.5)
EOSINOPHIL NFR BLD: 3.3 % (ref 0–8)
ERYTHROCYTE [DISTWIDTH] IN BLOOD BY AUTOMATED COUNT: 14.4 % (ref 11.5–14.5)
EST. GFR  (AFRICAN AMERICAN): >60 ML/MIN/1.73 M^2
EST. GFR  (NON AFRICAN AMERICAN): >60 ML/MIN/1.73 M^2
GLUCOSE SERPL-MCNC: 314 MG/DL (ref 70–110)
HCT VFR BLD AUTO: 39.4 % (ref 37–48.5)
HGB BLD-MCNC: 12.1 G/DL (ref 12–16)
IMM GRANULOCYTES # BLD AUTO: 0.03 K/UL (ref 0–0.04)
IMM GRANULOCYTES NFR BLD AUTO: 0.3 % (ref 0–0.5)
LYMPHOCYTES # BLD AUTO: 3.5 K/UL (ref 1–4.8)
LYMPHOCYTES NFR BLD: 38.9 % (ref 18–48)
MAGNESIUM SERPL-MCNC: 1.7 MG/DL (ref 1.6–2.6)
MCH RBC QN AUTO: 26.1 PG (ref 27–31)
MCHC RBC AUTO-ENTMCNC: 30.7 G/DL (ref 32–36)
MCV RBC AUTO: 85 FL (ref 82–98)
MONOCYTES # BLD AUTO: 0.5 K/UL (ref 0.3–1)
MONOCYTES NFR BLD: 5 % (ref 4–15)
NEUTROPHILS # BLD AUTO: 4.7 K/UL (ref 1.8–7.7)
NEUTROPHILS NFR BLD: 51.9 % (ref 38–73)
NRBC BLD-RTO: 0 /100 WBC
PHOSPHATE SERPL-MCNC: 3.7 MG/DL (ref 2.7–4.5)
PLATELET # BLD AUTO: 272 K/UL (ref 150–450)
PMV BLD AUTO: 10.3 FL (ref 9.2–12.9)
POCT GLUCOSE: 201 MG/DL (ref 70–110)
POCT GLUCOSE: 269 MG/DL (ref 70–110)
POCT GLUCOSE: 272 MG/DL (ref 70–110)
POCT GLUCOSE: 277 MG/DL (ref 70–110)
POCT GLUCOSE: 362 MG/DL (ref 70–110)
POTASSIUM SERPL-SCNC: 4 MMOL/L (ref 3.5–5.1)
PROT SERPL-MCNC: 6.8 G/DL (ref 6–8.4)
RBC # BLD AUTO: 4.64 M/UL (ref 4–5.4)
SODIUM SERPL-SCNC: 133 MMOL/L (ref 136–145)
WBC # BLD AUTO: 9.05 K/UL (ref 3.9–12.7)

## 2022-03-17 PROCEDURE — 85025 COMPLETE CBC W/AUTO DIFF WBC: CPT | Performed by: STUDENT IN AN ORGANIZED HEALTH CARE EDUCATION/TRAINING PROGRAM

## 2022-03-17 PROCEDURE — 94761 N-INVAS EAR/PLS OXIMETRY MLT: CPT

## 2022-03-17 PROCEDURE — 63600175 PHARM REV CODE 636 W HCPCS: Performed by: FAMILY MEDICINE

## 2022-03-17 PROCEDURE — 25000003 PHARM REV CODE 250: Performed by: STUDENT IN AN ORGANIZED HEALTH CARE EDUCATION/TRAINING PROGRAM

## 2022-03-17 PROCEDURE — 80053 COMPREHEN METABOLIC PANEL: CPT | Performed by: STUDENT IN AN ORGANIZED HEALTH CARE EDUCATION/TRAINING PROGRAM

## 2022-03-17 PROCEDURE — 99232 SBSQ HOSP IP/OBS MODERATE 35: CPT | Mod: ,,, | Performed by: INTERNAL MEDICINE

## 2022-03-17 PROCEDURE — 99233 PR SUBSEQUENT HOSPITAL CARE,LEVL III: ICD-10-PCS | Mod: ,,, | Performed by: INTERNAL MEDICINE

## 2022-03-17 PROCEDURE — 25000003 PHARM REV CODE 250: Performed by: FAMILY MEDICINE

## 2022-03-17 PROCEDURE — 36415 COLL VENOUS BLD VENIPUNCTURE: CPT | Performed by: STUDENT IN AN ORGANIZED HEALTH CARE EDUCATION/TRAINING PROGRAM

## 2022-03-17 PROCEDURE — 99232 PR SUBSEQUENT HOSPITAL CARE,LEVL II: ICD-10-PCS | Mod: ,,, | Performed by: INTERNAL MEDICINE

## 2022-03-17 PROCEDURE — C9399 UNCLASSIFIED DRUGS OR BIOLOG: HCPCS | Performed by: STUDENT IN AN ORGANIZED HEALTH CARE EDUCATION/TRAINING PROGRAM

## 2022-03-17 PROCEDURE — 63600175 PHARM REV CODE 636 W HCPCS: Performed by: STUDENT IN AN ORGANIZED HEALTH CARE EDUCATION/TRAINING PROGRAM

## 2022-03-17 PROCEDURE — 99233 SBSQ HOSP IP/OBS HIGH 50: CPT | Mod: ,,, | Performed by: INTERNAL MEDICINE

## 2022-03-17 PROCEDURE — 83735 ASSAY OF MAGNESIUM: CPT | Performed by: STUDENT IN AN ORGANIZED HEALTH CARE EDUCATION/TRAINING PROGRAM

## 2022-03-17 PROCEDURE — 84100 ASSAY OF PHOSPHORUS: CPT | Performed by: STUDENT IN AN ORGANIZED HEALTH CARE EDUCATION/TRAINING PROGRAM

## 2022-03-17 PROCEDURE — 11000001 HC ACUTE MED/SURG PRIVATE ROOM

## 2022-03-17 RX ORDER — INSULIN ASPART 100 [IU]/ML
4-6 INJECTION, SOLUTION INTRAVENOUS; SUBCUTANEOUS
Status: DISCONTINUED | OUTPATIENT
Start: 2022-03-17 | End: 2022-03-17

## 2022-03-17 RX ORDER — INSULIN ASPART 100 [IU]/ML
6-8 INJECTION, SOLUTION INTRAVENOUS; SUBCUTANEOUS
Status: DISCONTINUED | OUTPATIENT
Start: 2022-03-17 | End: 2022-03-18 | Stop reason: HOSPADM

## 2022-03-17 RX ORDER — INSULIN ASPART 100 [IU]/ML
1-10 INJECTION, SOLUTION INTRAVENOUS; SUBCUTANEOUS
Status: DISCONTINUED | OUTPATIENT
Start: 2022-03-17 | End: 2022-03-18 | Stop reason: HOSPADM

## 2022-03-17 RX ADMIN — ASPIRIN 81 MG: 81 TABLET, COATED ORAL at 07:03

## 2022-03-17 RX ADMIN — HEPARIN SODIUM 5000 UNITS: 5000 INJECTION INTRAVENOUS; SUBCUTANEOUS at 01:03

## 2022-03-17 RX ADMIN — LOSARTAN POTASSIUM 12.5 MG: 25 TABLET, FILM COATED ORAL at 07:03

## 2022-03-17 RX ADMIN — ACETAMINOPHEN 1000 MG: 500 TABLET ORAL at 04:03

## 2022-03-17 RX ADMIN — HYDROXYZINE HYDROCHLORIDE 25 MG: 25 TABLET ORAL at 08:03

## 2022-03-17 RX ADMIN — ATORVASTATIN CALCIUM 40 MG: 20 TABLET, FILM COATED ORAL at 07:03

## 2022-03-17 RX ADMIN — INSULIN ASPART 6 UNITS: 100 INJECTION, SOLUTION INTRAVENOUS; SUBCUTANEOUS at 04:03

## 2022-03-17 RX ADMIN — INSULIN ASPART 2 UNITS: 100 INJECTION, SOLUTION INTRAVENOUS; SUBCUTANEOUS at 08:03

## 2022-03-17 RX ADMIN — CARVEDILOL 6.25 MG: 6.25 TABLET, FILM COATED ORAL at 07:03

## 2022-03-17 RX ADMIN — INSULIN ASPART 6 UNITS: 100 INJECTION, SOLUTION INTRAVENOUS; SUBCUTANEOUS at 02:03

## 2022-03-17 RX ADMIN — HEPARIN SODIUM 5000 UNITS: 5000 INJECTION INTRAVENOUS; SUBCUTANEOUS at 05:03

## 2022-03-17 RX ADMIN — ONDANSETRON 4 MG: 2 INJECTION INTRAMUSCULAR; INTRAVENOUS at 08:03

## 2022-03-17 RX ADMIN — HEPARIN SODIUM 5000 UNITS: 5000 INJECTION INTRAVENOUS; SUBCUTANEOUS at 08:03

## 2022-03-17 RX ADMIN — INSULIN ASPART 6 UNITS: 100 INJECTION, SOLUTION INTRAVENOUS; SUBCUTANEOUS at 05:03

## 2022-03-17 RX ADMIN — INSULIN DETEMIR 10 UNITS: 100 INJECTION, SOLUTION SUBCUTANEOUS at 07:03

## 2022-03-17 RX ADMIN — CARVEDILOL 6.25 MG: 6.25 TABLET, FILM COATED ORAL at 08:03

## 2022-03-17 RX ADMIN — INSULIN ASPART 4 UNITS: 100 INJECTION, SOLUTION INTRAVENOUS; SUBCUTANEOUS at 11:03

## 2022-03-17 RX ADMIN — INSULIN ASPART 6 UNITS: 100 INJECTION, SOLUTION INTRAVENOUS; SUBCUTANEOUS at 07:03

## 2022-03-17 NOTE — ASSESSMENT & PLAN NOTE
- last ECHO 2016 with EF 30%  ;  Patient has AICD  - IVFs as above for HHS now stopped   - ECHO reviewed  · The left ventricle is normal in size with severely decreased systolic function. The estimated ejection fraction is 25%.  · There is severe left ventricular global hypokinesis.  · Normal right ventricular size with normal right ventricular systolic function.  · Left ventricular diastolic dysfunction.  · Mild left atrial enlargement.  · Normal central venous pressure (3 mmHg).  - resume ASA  - start coreg 6.25mg BID  - start losartan 12.5mg daily  - start atorvastatin 40mg daily  - monitor tele  - strict I/Os and daily weights

## 2022-03-17 NOTE — PLAN OF CARE
Problem: Adult Inpatient Plan of Care  Goal: Plan of Care Review  Outcome: Ongoing, Progressing  Flowsheets (Taken 3/17/2022 0304)  Plan of Care Reviewed With: patient  Goal: Patient-Specific Goal (Individualized)  Outcome: Ongoing, Progressing  Goal: Absence of Hospital-Acquired Illness or Injury  Outcome: Ongoing, Progressing  Goal: Optimal Comfort and Wellbeing  Outcome: Ongoing, Progressing  Goal: Readiness for Transition of Care  Outcome: Ongoing, Progressing     Patient alert and oriented x4, no distress noted, pt c/o 0/10  VSS, Respiratory status intact, pt on room air, Respiratory status intact, Educated patient to importance of using the call light system with any wants or needs, Updated pt to POC, pt verbalized understanding.

## 2022-03-17 NOTE — NURSING
Patient alert and oriented x4, no distress noted, pt c/o 0/10  VSS, Respiratory status intact, pt on room air, Respiratory status intact, Educated patient to importance of using the call light system with any wants or needs, Updated pt to POC, pt verbalized understanding.

## 2022-03-17 NOTE — PROGRESS NOTES
Christiano Dunn - Neurosurgery ([facility])  Endocrinology  Progress Note    Admit Date: 3/15/2022     46 year old female with T2DM, HFrEF (AICD), LBBB, cardiomyopathy, and HTN who presents with blurry vision, left sided weakness/numbness, and chest pain since afternoon of admission. She has not been taking any medications for the past year due to financial hardship and not having PCP.     - In the ED patient afebrile and hemodynamically stable saturating well on room air. Stroke code was activated and Neurovasc evaluated patient. CTA MP performed and negative for LVO, high grade stenosis, vascular malformation, and hemorrhage. Patient has a pacemaker in which she was told it was not MRI compatible.   - Pertinent labs on presentation: glucose >600, AG 18, pH 7.39, beta-hydroxybutyrate negative and no ketones in urine. Patient started on IVFs and insulin gtt with resolution of her neurological symptoms.     - Endocrinology consulted for evaluation and management of HHS    Regarding Diabetes Mellitus:    - Initially diagnosed with Type 2 diabetes mellitus: Since at least   - A1c greater than 14 on 03/15/2022, remote previous reading of 7.1 on 2016  - Patient reports having been on metformin in the past but could not tolerate secondary to diarrhea, has not been on any diabetes medications in several months    - Current symptoms:  Polyuria/polydipsia, visual changes, nausea/vomiting, numbness/tingling of the lower extremities more pronounced in the left  - Known diabetic complications: cardiovascular disease and cerebrovascular disease  - Cardiovascular risk factors: diabetes mellitus, dyslipidemia, obesity (BMI >= 30 kg/m2), and sedentary lifestyle      Interval HPI:   Overnight events:  No acute events reported overnight  Eatin%  Nausea: Yes  Hypoglycemia and intervention: No  Fever: No  TPN and/or TF: No  If yes, type of TF/TPN and rate: NA    /65   Pulse 69   Temp 97.8 °F (36.6 °C) (Oral)   Resp 17   " Ht 5' 5" (1.651 m)   Wt 86.2 kg (190 lb)   LMP  (LMP Unknown)   SpO2 97%   Breastfeeding No   BMI 31.62 kg/m²     Labs Reviewed and Include    Recent Labs   Lab 03/17/22  0652   *   CALCIUM 9.4   ALBUMIN 3.2*   PROT 6.8   *   K 4.0   CO2 22*   CL 99   BUN 7   CREATININE 0.6   ALKPHOS 99   ALT 30   AST 23   BILITOT 0.3     Lab Results   Component Value Date    WBC 9.05 03/17/2022    HGB 12.1 03/17/2022    HCT 39.4 03/17/2022    MCV 85 03/17/2022     03/17/2022     No results for input(s): TSH, FREET4 in the last 168 hours.  Lab Results   Component Value Date    HGBA1C >14.0 (H) 03/15/2022       Nutritional status:   Body mass index is 31.62 kg/m².  Lab Results   Component Value Date    ALBUMIN 3.2 (L) 03/17/2022    ALBUMIN 2.0 (L) 03/16/2022    ALBUMIN 3.9 03/07/2017     No results found for: PREALBUMIN    Estimated Creatinine Clearance: 125.7 mL/min (based on SCr of 0.6 mg/dL).    Accu-Checks  Recent Labs     03/16/22  0205 03/16/22  0308 03/16/22  0407 03/16/22  0514 03/16/22  0610 03/16/22  0708 03/16/22  1158 03/16/22  1629 03/16/22 2001 03/17/22  0727   POCTGLUCOSE 221* 248* 245* 241* 238* 237* 366* 272* 278* 277*       Current Medications and/or Treatments Impacting Glycemic Control  Immunotherapy:    Immunosuppressants       None          Steroids:   Hormones (From admission, onward)                Start     Stop Route Frequency Ordered    03/15/22 2100  melatonin tablet 6 mg         -- Oral Nightly PRN 03/15/22 2001          Pressors:    Autonomic Drugs (From admission, onward)                None          Hyperglycemia/Diabetes Medications:   Antihyperglycemics (From admission, onward)                Start     Stop Route Frequency Ordered    03/16/22 2000  insulin detemir U-100 pen 10 Units         -- SubQ 2 times daily 03/16/22 1946    03/16/22 1645  insulin aspart U-100 pen 6 Units         -- SubQ 3 times daily with meals 03/16/22 1514            ASSESSMENT and PLAN    Diabetes " mellitus type 2 in obese  Key History and Diagnostic Findings  - Initially diagnosed with Type 2 diabetes mellitus: Since at least   - A1c greater than 14 on 03/15/2022, remote previous reading of 7.1 on 2016  - Patient reports no recent history of diabetes medications, was on metformin in the past but could not tolerate secondary to diarrhea  - Weight based dosin kg x 0.5 = 43 TDD x 0.5 = 21 basal / 21 prandial  - 1700/TDD = 40 (estimated insulin sensitivity factor)  - 450/TDD = 10.5 (estimated starting carb ratio for prandial dosing)  - Glucose Goals:  160-200mg/dL  - HHS resolved, started on basal/bolus therapy evening of 2022  - 24 hour glucose trend:  Hyperglycemia in the low 300s and mid 200s since starting MDI yesterday evening    Plan  - Continue Levemir 10 units twice daily  - Continue mealtime aspart 6 units with moderate correction scale  - Agree with atorvastatin 40 mg daily, patient should continue this after discharge  - Patient will require basal/bolus insulin regimen at discharge given her A1c greater than 14; this was discussed with patient who understands  - Noted history of cardiomyopathy and EF of 25% on echo 2022; patient will benefit from SGLT2 inhibitor outpatient    - Hypoglycemia protocol in place  - If blood glucose greater than 300, please ask patient not to eat food or drink anything other than water until correctional insulin has brought it back below 250  - Please ensure patient receives their p.r.n. insulin aspart if they eat a snack with more than 30 g of carbohydrate    - Patient will require close outpatient follow-up with Endocrinology, we will set her up with fellows discharge clinic appointment  - Outpatient, recommend checking C-peptide to assess pancreatic reserve, checking AMANDA-65 to assess autoimmune contribution, consider repeat trial of metformin, GLP 1 would be great for weight loss though unclear if patient would be able to tolerate with nausea,  patient has history of cardiomyopathy with reduced EF of 25% and will benefit from SGLT2 inhibitor, certainly recommend CGM while on MDI if insurance allows, will need urinalysis to assess for proteinuria and referral to Ophthalmology do an annual dilated eye exam    Mixed hyperlipidemia  Key History and Diagnostic Findings  -  with elevated triglycerides of 438 on 02/19/2021     Plan  - Agree with atorvastatin 40 mg daily per neurology  - Recheck lipid panel in 3 months after continued statin use    Chronic systolic congestive heart failure  - Echocardiogram 03/16/2022 demonstrates EF 25%  - Please see plan as above      Parker Main DO  [facility], [address]th [address]  [address] [place], [place], [address]    Office: [phone]  Fax: [phone]    Disclaimer: This note has been generated using voice-recognition software. There may be typographical errors that have been missed during proof-reading.    The above history labs imaging impression and plan were discussed with attending physician who is in agreement and also took part in this patient's care.  I personally reviewed all of the patients available medications, labs, imaging, vitals, allergies, medical history.

## 2022-03-17 NOTE — ASSESSMENT & PLAN NOTE
Key History and Diagnostic Findings  - Initially diagnosed with Type 2 diabetes mellitus: Since at least   - A1c greater than 14 on 03/15/2022, remote previous reading of 7.1 on 2016  - Patient reports no recent history of diabetes medications, was on metformin in the past but could not tolerate secondary to diarrhea  - Weight based dosin kg x 0.5 = 43 TDD x 0.5 = 21 basal / 21 prandial  - 1700/TDD = 40 (estimated insulin sensitivity factor)  - 450/TDD = 10.5 (estimated starting carb ratio for prandial dosing)  - Glucose Goals:  160-200mg/dL  - HHS resolved, started on basal/bolus therapy evening of 2022  - 24 hour glucose trend:  Hyperglycemia in the low 300s and mid 200s since starting MDI yesterday evening    Plan  - Continue Levemir 10 units twice daily  - Continue mealtime aspart 6 units with moderate correction scale  - Agree with atorvastatin 40 mg daily, patient should continue this after discharge  - Patient will require basal/bolus insulin regimen at discharge given her A1c greater than 14; this was discussed with patient who understands  - Noted history of cardiomyopathy and EF of 25% on echo 2022; patient will benefit from SGLT2 inhibitor outpatient    - Hypoglycemia protocol in place  - If blood glucose greater than 300, please ask patient not to eat food or drink anything other than water until correctional insulin has brought it back below 250  - Please ensure patient receives their p.r.n. insulin aspart if they eat a snack with more than 30 g of carbohydrate    - Patient will require close outpatient follow-up with Endocrinology, we will set her up with fellows discharge clinic appointment  - Outpatient, recommend checking C-peptide to assess pancreatic reserve, checking AMANDA-65 to assess autoimmune contribution, consider repeat trial of metformin, GLP 1 would be great for weight loss though unclear if patient would be able to tolerate with nausea, patient has history of  cardiomyopathy with reduced EF of 25% and will benefit from SGLT2 inhibitor, certainly recommend CGM while on MDI if insurance allows, will need urinalysis to assess for proteinuria and referral to Ophthalmology do an annual dilated eye exam

## 2022-03-17 NOTE — PROGRESS NOTES
Samaria Verma is a 46 y.o. female with PMHx of CHF (AICD), LBBB, cardiomyopathy, DM, and HTN who presented with chest pain, blurry vision, left sided numbness, and double vision. Stroke code called. Blurry vision is chronic and has not sought out care for this issue. Reports medication noncompliance due to financial hardship and does not have a PCP.     Stroke provider who initially evaluated patient in ED noted evidence of functional exam findings. Patient reported that she could not move left arm, however on distraction patient moved hand and was able to maintain arm above her head with out dropping it. Noted increased tone on the left arm, mild left facial weakness, and fluctuating visual fields defect. Patient was noted to be hyperglycemic with blood glucose >600 and hyponatremic.CTA MP negative for LVO, high grade stenosis, vascular malformation, and hemorrhage. Patient has a pacemaker in which is not MRI compatible.     She was not a candidate for TPA as there was stroke evidence of stroke mimic with blood glucose >600 and functional exam. She was admitted to hospital medicine for HHS. Exam improved with improvement in blood glucose. There is low suspicion for cerebrovascular origin for patient's symptoms.     Repeat CT without acute changes. Continue asa 81 mg daily, atorvastatin 40 mg daily, and risk factor modification (DM, HTN, diet/exercise). Stroke team will sign off at this time.        Archana Car PA-C  Vascular Neurology  [phone]

## 2022-03-17 NOTE — SUBJECTIVE & OBJECTIVE
Interval History: Pt seen and examined this morning on rounds. KRISTEN. Continues to feel better daily. Vision improved, appetite is improved. Continues to have some weakness. Denies any CP or SOB. Care plan reviewed. Otherwise, doing well and with no further complaints at this time. Endocrine following and adjusting meds.      Objective:     Vital Signs (Most Recent):  Temp: 98 °F (36.7 °C) (03/17/22 1113)  Pulse: 67 (03/17/22 1113)  Resp: 18 (03/17/22 1113)  BP: 129/60 (03/17/22 1113)  SpO2: 95 % (03/17/22 1113)   Vital Signs (24h Range):  Temp:  [97.8 °F (36.6 °C)-98.6 °F (37 °C)] 98 °F (36.7 °C)  Pulse:  [67-89] 67  Resp:  [17-18] 18  SpO2:  [93 %-99 %] 95 %  BP: (122-147)/(58-88) 129/60     Weight: 86.2 kg (190 lb)  Body mass index is 31.62 kg/m².    Intake/Output Summary (Last 24 hours) at 3/17/2022 1254  Last data filed at 3/17/2022 0600  Gross per 24 hour   Intake 100 ml   Output 300 ml   Net -200 ml        Physical Exam  Gen: in NAD, appears stated age; pt is obese  Neuro: AAOx4, CN2-12 grossly intact BL; motor, sensory, and strength grossly intact BL  HEENT: NTNC, EOMI, PERRLA, MMM; no thyromegaly or lymphadenopathy; no JVD appreciated  CVS: RRR, no m/r/g; S1/S2 auscultated with no S3 or S4; capillary refill < 2 sec  Resp: lungs CTAB, no w/r/r; no belabored breathing or accessory muscle use appreciated   Abd: BS+ in all 4 quadrants; NTND, soft to palpation; no organomegaly appreciated   Extrem: pulses full, equal, and regular over all 4 extremities; no UE or LE edema BL      Significant Labs: All pertinent labs within the past 24 hours have been reviewed.    Significant Imaging: I have reviewed all pertinent imaging results/findings within the past 24 hours.

## 2022-03-17 NOTE — PLAN OF CARE
Problem: Adult Inpatient Plan of Care  Goal: Plan of Care Review  Outcome: Ongoing, Progressing  Flowsheets (Taken 3/17/2022 1708)  Plan of Care Reviewed With: patient  Goal: Patient-Specific Goal (Individualized)  Outcome: Ongoing, Progressing  Flowsheets (Taken 3/17/2022 1708)  Anxieties, Fears or Concerns: none  Individualized Care Needs: none  Patient-Specific Goals (Include Timeframe): none       POC reviewed with patient. Patient verbalized understanding of POC. All comments and concerns addressed. Bed locked and low. Bed alarm set, Call light within reach. Safety precautions maintained. Will continue to monitor for changes to poc and clinical conditions.

## 2022-03-17 NOTE — PROGRESS NOTES
Food & Nutrition  Education    Diet Education: Diabetic   Learners: Pt       Nutrition Education provided with handouts: Carbohydrate Counting for People with Diabetes       Comments: Pt sitting up in bed with no family at bedside. Dicussed A1c >14.0. Pt reports no previous diabetic diet education. Pt will start taking insulin after discharge. Pt reports checking blood sugars when she is not felling well. Dicussed CHO servicing sizes, non-starchy vegetables, and lean protein. Spoke about well balanced meals and the importance of not skipping meals throughout the day. Encourage pt to void sugary beverages. Pt verbalized understanding. All questions and concerns addressed. Highly encouraged outpatient RD visit after discharge for more diet education.     Pt reports decreased appetite with N/V and 30 lbs unintentional high loss x 1 month. No significant wt los per chart review. Please obtain new wt for accuracy. Pt appears nourished.       Follow-Up: Yes     Please re-consult as needed.

## 2022-03-17 NOTE — ASSESSMENT & PLAN NOTE
Body mass index is 31.62 kg/m². Morbid obesity complicates all aspects of disease management from diagnostic modalities to treatment. Weight loss encouraged and health benefits explained to patient.

## 2022-03-17 NOTE — SUBJECTIVE & OBJECTIVE
"Interval HPI:   Overnight events:  No acute events reported overnight  Eatin%  Nausea: Yes  Hypoglycemia and intervention: No  Fever: No  TPN and/or TF: No  If yes, type of TF/TPN and rate: NA    /65   Pulse 69   Temp 97.8 °F (36.6 °C) (Oral)   Resp 17   Ht 5' 5" (1.651 m)   Wt 86.2 kg (190 lb)   LMP  (LMP Unknown)   SpO2 97%   Breastfeeding No   BMI 31.62 kg/m²     Labs Reviewed and Include    Recent Labs   Lab 22  0652   *   CALCIUM 9.4   ALBUMIN 3.2*   PROT 6.8   *   K 4.0   CO2 22*   CL 99   BUN 7   CREATININE 0.6   ALKPHOS 99   ALT 30   AST 23   BILITOT 0.3     Lab Results   Component Value Date    WBC 9.05 2022    HGB 12.1 2022    HCT 39.4 2022    MCV 85 2022     2022     No results for input(s): TSH, FREET4 in the last 168 hours.  Lab Results   Component Value Date    HGBA1C >14.0 (H) 03/15/2022       Nutritional status:   Body mass index is 31.62 kg/m².  Lab Results   Component Value Date    ALBUMIN 3.2 (L) 2022    ALBUMIN 2.0 (L) 2022    ALBUMIN 3.9 2017     No results found for: PREALBUMIN    Estimated Creatinine Clearance: 125.7 mL/min (based on SCr of 0.6 mg/dL).    Accu-Checks  Recent Labs     22  0205 22  0308 22  0407 22  0514 22  0610 22  0708 22  1158 22  1629 22  0727   POCTGLUCOSE 221* 248* 245* 241* 238* 237* 366* 272* 278* 277*       Current Medications and/or Treatments Impacting Glycemic Control  Immunotherapy:    Immunosuppressants       None          Steroids:   Hormones (From admission, onward)                Start     Stop Route Frequency Ordered    03/15/22 2100  melatonin tablet 6 mg         -- Oral Nightly PRN 03/15/22 2001          Pressors:    Autonomic Drugs (From admission, onward)                None          Hyperglycemia/Diabetes Medications:   Antihyperglycemics (From admission, onward)                Start     Stop " Route Frequency Ordered    03/16/22 2000  insulin detemir U-100 pen 10 Units         -- SubQ 2 times daily 03/16/22 1946    03/16/22 1645  insulin aspart U-100 pen 6 Units         -- SubQ 3 times daily with meals 03/16/22 2910

## 2022-03-17 NOTE — ASSESSMENT & PLAN NOTE
- patient with transient neurological symptoms in setting of BG>600 with improvement following insulin and IVFs. HHS. Given fluids and IV insulin. Endocrine consulted.  - Insulin gtt transitioned to levemir and prandial insulin  - neuro concerns resolved, appetite improving   - Endocrinology consulted  ;  Appreciate recs, managing insulin

## 2022-03-17 NOTE — PROGRESS NOTES
[facility] - [facility] ([facility])  [facility] Medicine  Progress Note    Patient Name: Samaria Verma  MRN: [id number]  Patient Class: IP- Inpatient   Admission Date: 3/15/2022  Length of Stay: 1 days  Attending Physician: Raul Zhang MD  Primary Care Provider: No primary care provider on file.        Subjective:     Principal Problem:Hyperosmolar hyperglycemic state (HHS)        HPI:  This is a 46 year old female with a past medical history of HFrEF (AICD), LBBB, cardiomyopathy, DM, and HTN who presents to the ED with chief complaint of blurry vision, left sided weakness/numbness, and chest pain. She reports that symptoms started this afternoon at 1530. She has not been taking any medications for the past year due to financial hardship and not having PCP. Patient states that she has never been prescribed insulin for DM control. In the ED patient afebrile and hemodynamically stable saturating well on room air. Stroke code was activated and Neurovasc evaluated patient. CTA MP performed and negative for LVO, high grade stenosis, vascular malformation, and hemorrhage. Patient has a pacemaker in which she was told it was not MRI compatible. Patient BG on presentation >600, AG 18, pH 7.39, beta-hydroxybutyrate negative and no ketones in urine. Patient started on IVFs and insulin gtt with resolution of her neurological symptoms. She was admitted to the care of medicine for further evaluation and management of suspected HHS.      Overview/Hospital Course:  Pt admitted to  team G, and was initiated on insulin gtt for HHS. BG gradually trended down, pt tolerating PO; insulin gtt discontinued and pt started on levemir with prandial insulin; endocrinology following. Electrolytes aggressively replaced.      Interval History: Pt seen and examined this morning on rounds. KRISTEN. Continues to feel better daily. Vision improved, appetite is improved. Continues to have some weakness. Denies any CP or SOB. Care plan reviewed. Otherwise,  doing well and with no further complaints at this time. Endocrine following and adjusting meds.      Objective:     Vital Signs (Most Recent):  Temp: 98 °F (36.7 °C) (03/17/22 1113)  Pulse: 67 (03/17/22 1113)  Resp: 18 (03/17/22 1113)  BP: 129/60 (03/17/22 1113)  SpO2: 95 % (03/17/22 1113)   Vital Signs (24h Range):  Temp:  [97.8 °F (36.6 °C)-98.6 °F (37 °C)] 98 °F (36.7 °C)  Pulse:  [67-89] 67  Resp:  [17-18] 18  SpO2:  [93 %-99 %] 95 %  BP: (122-147)/(58-88) 129/60     Weight: 86.2 kg (190 lb)  Body mass index is 31.62 kg/m².    Intake/Output Summary (Last 24 hours) at 3/17/2022 1254  Last data filed at 3/17/2022 0600  Gross per 24 hour   Intake 100 ml   Output 300 ml   Net -200 ml        Physical Exam  Gen: in NAD, appears stated age; pt is obese  Neuro: AAOx4, CN2-12 grossly intact BL; motor, sensory, and strength grossly intact BL  HEENT: NTNC, EOMI, PERRLA, MMM; no thyromegaly or lymphadenopathy; no JVD appreciated  CVS: RRR, no m/r/g; S1/S2 auscultated with no S3 or S4; capillary refill < 2 sec  Resp: lungs CTAB, no w/r/r; no belabored breathing or accessory muscle use appreciated   Abd: BS+ in all 4 quadrants; NTND, soft to palpation; no organomegaly appreciated   Extrem: pulses full, equal, and regular over all 4 extremities; no UE or LE edema BL      Significant Labs: All pertinent labs within the past 24 hours have been reviewed.    Significant Imaging: I have reviewed all pertinent imaging results/findings within the past 24 hours.      Assessment/Plan:      * Hyperosmolar hyperglycemic state (HHS)  - patient with transient neurological symptoms in setting of BG>600 with improvement following insulin and IVFs. HHS. Given fluids and IV insulin. Endocrine consulted.  - Insulin gtt transitioned to levemir and prandial insulin  - neuro concerns resolved, appetite improving   - Endocrinology consulted  ;  Appreciate recs, managing insulin      Transient neurological symptoms  - from HHS, resolved      Mixed  hyperlipidemia  - start statin      Class 1 obesity in adult  Body mass index is 31.62 kg/m². Morbid obesity complicates all aspects of disease management from diagnostic modalities to treatment. Weight loss encouraged and health benefits explained to patient.         Chronic systolic congestive heart failure  - last ECHO 2016 with EF 30%  ;  Patient has AICD  - IVFs as above for HHS now stopped   - ECHO reviewed  · The left ventricle is normal in size with severely decreased systolic function. The estimated ejection fraction is 25%.  · There is severe left ventricular global hypokinesis.  · Normal right ventricular size with normal right ventricular systolic function.  · Left ventricular diastolic dysfunction.  · Mild left atrial enlargement.  · Normal central venous pressure (3 mmHg).  - resume ASA  - start coreg 6.25mg BID  - start losartan 12.5mg daily  - start atorvastatin 40mg daily  - monitor tele  - strict I/Os and daily weights      Primary hypertension  - no meds for last year  - start coreg and losartan  - hydralazine prn      Diabetes mellitus type 2 in obese  - see HHS above        VTE Risk Mitigation (From admission, onward)         Ordered     heparin (porcine) injection 5,000 Units  Every 8 hours         03/15/22 2001     IP VTE HIGH RISK PATIENT  Once         03/15/22 2001     Place sequential compression device  Until discontinued         03/15/22 2001                Discharge Planning   CARLOS: 3/18/2022     Code Status: Full Code   Is the patient medically ready for discharge?: No    Reason for patient still in hospital (select all that apply): Patient trending condition  Discharge Plan A: Home Health            Raul Zhang MD  Department of Hospital Medicine   [facility] - Neurosurgery ([facility])

## 2022-03-18 VITALS
OXYGEN SATURATION: 98 % | TEMPERATURE: 98 F | HEART RATE: 75 BPM | HEIGHT: 65 IN | WEIGHT: 190 LBS | DIASTOLIC BLOOD PRESSURE: 80 MMHG | BODY MASS INDEX: 31.65 KG/M2 | RESPIRATION RATE: 17 BRPM | SYSTOLIC BLOOD PRESSURE: 138 MMHG

## 2022-03-18 LAB
ALBUMIN SERPL BCP-MCNC: 3.2 G/DL (ref 3.5–5.2)
ALP SERPL-CCNC: 100 U/L (ref 55–135)
ALT SERPL W/O P-5'-P-CCNC: 28 U/L (ref 10–44)
ANION GAP SERPL CALC-SCNC: 12 MMOL/L (ref 8–16)
AST SERPL-CCNC: 23 U/L (ref 10–40)
BASOPHILS # BLD AUTO: 0.03 K/UL (ref 0–0.2)
BASOPHILS NFR BLD: 0.3 % (ref 0–1.9)
BILIRUB SERPL-MCNC: 0.3 MG/DL (ref 0.1–1)
BUN SERPL-MCNC: 9 MG/DL (ref 6–20)
CALCIUM SERPL-MCNC: 9.5 MG/DL (ref 8.7–10.5)
CHLORIDE SERPL-SCNC: 99 MMOL/L (ref 95–110)
CO2 SERPL-SCNC: 24 MMOL/L (ref 23–29)
CREAT SERPL-MCNC: 0.6 MG/DL (ref 0.5–1.4)
DIFFERENTIAL METHOD: ABNORMAL
EOSINOPHIL # BLD AUTO: 0.3 K/UL (ref 0–0.5)
EOSINOPHIL NFR BLD: 2.5 % (ref 0–8)
ERYTHROCYTE [DISTWIDTH] IN BLOOD BY AUTOMATED COUNT: 14.3 % (ref 11.5–14.5)
EST. GFR  (AFRICAN AMERICAN): >60 ML/MIN/1.73 M^2
EST. GFR  (NON AFRICAN AMERICAN): >60 ML/MIN/1.73 M^2
GLUCOSE SERPL-MCNC: 248 MG/DL (ref 70–110)
HCT VFR BLD AUTO: 39.8 % (ref 37–48.5)
HGB BLD-MCNC: 12 G/DL (ref 12–16)
IMM GRANULOCYTES # BLD AUTO: 0.05 K/UL (ref 0–0.04)
IMM GRANULOCYTES NFR BLD AUTO: 0.5 % (ref 0–0.5)
LYMPHOCYTES # BLD AUTO: 3.9 K/UL (ref 1–4.8)
LYMPHOCYTES NFR BLD: 38.2 % (ref 18–48)
MAGNESIUM SERPL-MCNC: 1.7 MG/DL (ref 1.6–2.6)
MCH RBC QN AUTO: 25.2 PG (ref 27–31)
MCHC RBC AUTO-ENTMCNC: 30.2 G/DL (ref 32–36)
MCV RBC AUTO: 83 FL (ref 82–98)
MONOCYTES # BLD AUTO: 0.4 K/UL (ref 0.3–1)
MONOCYTES NFR BLD: 4.4 % (ref 4–15)
NEUTROPHILS # BLD AUTO: 5.5 K/UL (ref 1.8–7.7)
NEUTROPHILS NFR BLD: 54.1 % (ref 38–73)
NRBC BLD-RTO: 0 /100 WBC
PHOSPHATE SERPL-MCNC: 4.3 MG/DL (ref 2.7–4.5)
PLATELET # BLD AUTO: 265 K/UL (ref 150–450)
PMV BLD AUTO: 10.3 FL (ref 9.2–12.9)
POCT GLUCOSE: 210 MG/DL (ref 70–110)
POCT GLUCOSE: 242 MG/DL (ref 70–110)
POTASSIUM SERPL-SCNC: 3.5 MMOL/L (ref 3.5–5.1)
PROT SERPL-MCNC: 6.9 G/DL (ref 6–8.4)
RBC # BLD AUTO: 4.77 M/UL (ref 4–5.4)
SODIUM SERPL-SCNC: 135 MMOL/L (ref 136–145)
WBC # BLD AUTO: 10.1 K/UL (ref 3.9–12.7)

## 2022-03-18 PROCEDURE — 84100 ASSAY OF PHOSPHORUS: CPT | Performed by: STUDENT IN AN ORGANIZED HEALTH CARE EDUCATION/TRAINING PROGRAM

## 2022-03-18 PROCEDURE — 63600175 PHARM REV CODE 636 W HCPCS: Performed by: FAMILY MEDICINE

## 2022-03-18 PROCEDURE — 25000003 PHARM REV CODE 250: Performed by: STUDENT IN AN ORGANIZED HEALTH CARE EDUCATION/TRAINING PROGRAM

## 2022-03-18 PROCEDURE — 25000003 PHARM REV CODE 250: Performed by: FAMILY MEDICINE

## 2022-03-18 PROCEDURE — 99232 PR SUBSEQUENT HOSPITAL CARE,LEVL II: ICD-10-PCS | Mod: GC,,, | Performed by: INTERNAL MEDICINE

## 2022-03-18 PROCEDURE — 85025 COMPLETE CBC W/AUTO DIFF WBC: CPT | Performed by: STUDENT IN AN ORGANIZED HEALTH CARE EDUCATION/TRAINING PROGRAM

## 2022-03-18 PROCEDURE — 36415 COLL VENOUS BLD VENIPUNCTURE: CPT | Performed by: STUDENT IN AN ORGANIZED HEALTH CARE EDUCATION/TRAINING PROGRAM

## 2022-03-18 PROCEDURE — 80053 COMPREHEN METABOLIC PANEL: CPT | Performed by: STUDENT IN AN ORGANIZED HEALTH CARE EDUCATION/TRAINING PROGRAM

## 2022-03-18 PROCEDURE — 99239 HOSP IP/OBS DSCHRG MGMT >30: CPT | Mod: ,,, | Performed by: INTERNAL MEDICINE

## 2022-03-18 PROCEDURE — 25000003 PHARM REV CODE 250: Performed by: INTERNAL MEDICINE

## 2022-03-18 PROCEDURE — 83735 ASSAY OF MAGNESIUM: CPT | Performed by: STUDENT IN AN ORGANIZED HEALTH CARE EDUCATION/TRAINING PROGRAM

## 2022-03-18 PROCEDURE — 99232 SBSQ HOSP IP/OBS MODERATE 35: CPT | Mod: GC,,, | Performed by: INTERNAL MEDICINE

## 2022-03-18 PROCEDURE — 99239 PR HOSPITAL DISCHARGE DAY,>30 MIN: ICD-10-PCS | Mod: ,,, | Performed by: INTERNAL MEDICINE

## 2022-03-18 RX ORDER — INSULIN ASPART 100 [IU]/ML
6-8 INJECTION, SOLUTION INTRAVENOUS; SUBCUTANEOUS
Qty: 30 ML | Refills: 11 | Status: SHIPPED | OUTPATIENT
Start: 2022-03-18 | End: 2022-03-18 | Stop reason: SDUPTHER

## 2022-03-18 RX ORDER — INSULIN ASPART 100 [IU]/ML
1-10 INJECTION, SOLUTION INTRAVENOUS; SUBCUTANEOUS
Refills: 0
Start: 2022-03-18 | End: 2022-04-07

## 2022-03-18 RX ORDER — ATORVASTATIN CALCIUM 40 MG/1
40 TABLET, FILM COATED ORAL DAILY
Qty: 90 TABLET | Refills: 3 | Status: SHIPPED | OUTPATIENT
Start: 2022-03-19 | End: 2023-03-19

## 2022-03-18 RX ORDER — ASPIRIN 81 MG/1
81 TABLET ORAL DAILY
Qty: 30 TABLET | Refills: 11 | Status: SHIPPED | OUTPATIENT
Start: 2022-03-18

## 2022-03-18 RX ORDER — INSULIN ASPART 100 [IU]/ML
6-8 INJECTION, SOLUTION INTRAVENOUS; SUBCUTANEOUS
Qty: 9 ML | Refills: 11 | Status: SHIPPED | OUTPATIENT
Start: 2022-03-18 | End: 2022-04-05 | Stop reason: SDUPTHER

## 2022-03-18 RX ORDER — INSULIN GLARGINE 100 [IU]/ML
24 INJECTION, SOLUTION SUBCUTANEOUS NIGHTLY
Qty: 7.2 ML | Refills: 11 | Status: SHIPPED | OUTPATIENT
Start: 2022-03-18 | End: 2022-04-05 | Stop reason: SDUPTHER

## 2022-03-18 RX ORDER — POTASSIUM CHLORIDE 750 MG/1
40 CAPSULE, EXTENDED RELEASE ORAL ONCE
Status: COMPLETED | OUTPATIENT
Start: 2022-03-18 | End: 2022-03-18

## 2022-03-18 RX ORDER — GABAPENTIN 300 MG/1
300 CAPSULE ORAL 2 TIMES DAILY
Qty: 60 CAPSULE | Refills: 3 | Status: SHIPPED | OUTPATIENT
Start: 2022-03-18 | End: 2022-06-27 | Stop reason: SDUPTHER

## 2022-03-18 RX ORDER — CARVEDILOL 6.25 MG/1
6.25 TABLET ORAL 2 TIMES DAILY
Qty: 60 TABLET | Refills: 11 | Status: SHIPPED | OUTPATIENT
Start: 2022-03-18 | End: 2023-10-04 | Stop reason: SDUPTHER

## 2022-03-18 RX ORDER — INSULIN GLARGINE 100 [IU]/ML
12 INJECTION, SOLUTION SUBCUTANEOUS 2 TIMES DAILY
Qty: 20 ML | Refills: 11 | Status: SHIPPED | OUTPATIENT
Start: 2022-03-18 | End: 2022-03-18 | Stop reason: HOSPADM

## 2022-03-18 RX ORDER — NITROGLYCERIN 0.4 MG/1
0.4 TABLET SUBLINGUAL EVERY 5 MIN PRN
Qty: 25 TABLET | Refills: 0 | Status: SHIPPED | OUTPATIENT
Start: 2022-03-18 | End: 2023-03-18

## 2022-03-18 RX ORDER — INSULIN GLARGINE 100 [IU]/ML
12 INJECTION, SOLUTION SUBCUTANEOUS 2 TIMES DAILY
Qty: 7.2 ML | Refills: 11 | Status: SHIPPED | OUTPATIENT
Start: 2022-03-18 | End: 2022-03-18 | Stop reason: SDUPTHER

## 2022-03-18 RX ORDER — METFORMIN HYDROCHLORIDE 1000 MG/1
1000 TABLET ORAL 2 TIMES DAILY WITH MEALS
Qty: 60 TABLET | Refills: 11 | Status: SHIPPED | OUTPATIENT
Start: 2022-03-18 | End: 2022-03-18 | Stop reason: HOSPADM

## 2022-03-18 RX ORDER — LANCETS
1 EACH MISCELLANEOUS
Qty: 200 EACH | Refills: 5 | Status: SHIPPED | OUTPATIENT
Start: 2022-03-18

## 2022-03-18 RX ORDER — SPIRONOLACTONE 50 MG/1
50 TABLET, FILM COATED ORAL DAILY
Qty: 30 TABLET | Refills: 11 | Status: SHIPPED | OUTPATIENT
Start: 2022-03-18 | End: 2022-06-08

## 2022-03-18 RX ORDER — METFORMIN HYDROCHLORIDE 500 MG/1
1000 TABLET, EXTENDED RELEASE ORAL 2 TIMES DAILY WITH MEALS
Qty: 120 TABLET | Refills: 11 | Status: SHIPPED | OUTPATIENT
Start: 2022-03-18 | End: 2022-03-28

## 2022-03-18 RX ORDER — PEN NEEDLE, DIABETIC 30 GX3/16"
1 NEEDLE, DISPOSABLE MISCELLANEOUS 2 TIMES DAILY WITH MEALS
Qty: 100 EACH | Refills: 11 | Status: SHIPPED | OUTPATIENT
Start: 2022-03-18 | End: 2022-04-05 | Stop reason: SDUPTHER

## 2022-03-18 RX ORDER — FUROSEMIDE 40 MG/1
40 TABLET ORAL 2 TIMES DAILY
Qty: 30 TABLET | Refills: 1 | Status: ON HOLD | OUTPATIENT
Start: 2022-03-18 | End: 2022-08-02 | Stop reason: SDUPTHER

## 2022-03-18 RX ADMIN — POTASSIUM CHLORIDE 40 MEQ: 10 CAPSULE, COATED, EXTENDED RELEASE ORAL at 09:03

## 2022-03-18 RX ADMIN — HEPARIN SODIUM 5000 UNITS: 5000 INJECTION INTRAVENOUS; SUBCUTANEOUS at 05:03

## 2022-03-18 RX ADMIN — ATORVASTATIN CALCIUM 40 MG: 20 TABLET, FILM COATED ORAL at 08:03

## 2022-03-18 RX ADMIN — LOSARTAN POTASSIUM 12.5 MG: 25 TABLET, FILM COATED ORAL at 08:03

## 2022-03-18 RX ADMIN — ASPIRIN 81 MG: 81 TABLET, COATED ORAL at 08:03

## 2022-03-18 RX ADMIN — CARVEDILOL 6.25 MG: 6.25 TABLET, FILM COATED ORAL at 08:03

## 2022-03-18 RX ADMIN — ONDANSETRON 4 MG: 2 INJECTION INTRAMUSCULAR; INTRAVENOUS at 09:03

## 2022-03-18 RX ADMIN — INSULIN ASPART 4 UNITS: 100 INJECTION, SOLUTION INTRAVENOUS; SUBCUTANEOUS at 11:03

## 2022-03-18 RX ADMIN — INSULIN ASPART 4 UNITS: 100 INJECTION, SOLUTION INTRAVENOUS; SUBCUTANEOUS at 08:03

## 2022-03-18 RX ADMIN — INSULIN ASPART 6 UNITS: 100 INJECTION, SOLUTION INTRAVENOUS; SUBCUTANEOUS at 08:03

## 2022-03-18 RX ADMIN — HYDROXYZINE HYDROCHLORIDE 25 MG: 25 TABLET ORAL at 08:03

## 2022-03-18 NOTE — SUBJECTIVE & OBJECTIVE
"Interval HPI:   Overnight events:  No acute events reported overnight  Eatin%  Nausea: Yes  Hypoglycemia and intervention: No  Fever: No  TPN and/or TF: No  If yes, type of TF/TPN and rate: NA    BP (!) 117/59 (BP Location: Left arm, Patient Position: Lying)   Pulse 70   Temp 97.3 °F (36.3 °C) (Oral)   Resp 16   Ht 5' 5" (1.651 m)   Wt 86.2 kg (190 lb)   LMP  (LMP Unknown)   SpO2 98%   Breastfeeding No   BMI 31.62 kg/m²     Labs Reviewed and Include    Recent Labs   Lab 22  0652   *   CALCIUM 9.4   ALBUMIN 3.2*   PROT 6.8   *   K 4.0   CO2 22*   CL 99   BUN 7   CREATININE 0.6   ALKPHOS 99   ALT 30   AST 23   BILITOT 0.3     Lab Results   Component Value Date    WBC 9.05 2022    HGB 12.1 2022    HCT 39.4 2022    MCV 85 2022     2022     No results for input(s): TSH, FREET4 in the last 168 hours.  Lab Results   Component Value Date    HGBA1C >14.0 (H) 03/15/2022       Nutritional status:   Body mass index is 31.62 kg/m².  Lab Results   Component Value Date    ALBUMIN 3.2 (L) 2022    ALBUMIN 2.0 (L) 2022    ALBUMIN 3.9 2017     No results found for: PREALBUMIN    Estimated Creatinine Clearance: 125.7 mL/min (based on SCr of 0.6 mg/dL).    Accu-Checks  Recent Labs     22  0610 22  0708 22  1158 22  1629 22  2001 22  0727 22  1058 22  1432 22  1622 22  195   POCTGLUCOSE 238* 237* 366* 272* 278* 277* 362* 269* 272* 201*       Current Medications and/or Treatments Impacting Glycemic Control  Immunotherapy:    Immunosuppressants       None          Steroids:   Hormones (From admission, onward)                Start     Stop Route Frequency Ordered    03/15/22 2100  melatonin tablet 6 mg         -- Oral Nightly PRN 03/15/22 2001          Pressors:    Autonomic Drugs (From admission, onward)                None          Hyperglycemia/Diabetes Medications:   Antihyperglycemics " (From admission, onward)                Start     Stop Route Frequency Ordered    03/17/22 2100  insulin detemir U-100 pen 12 Units         -- SubQ 2 times daily 03/17/22 2002 03/17/22 1645  insulin aspart U-100 pen 6-8 Units         -- SubQ 3 times daily with meals 03/17/22 1424    03/17/22 1526  insulin aspart U-100 pen 1-10 Units         -- SubQ Before meals & nightly PRN 03/17/22 1426

## 2022-03-18 NOTE — PLAN OF CARE
Problem: Adult Inpatient Plan of Care  Goal: Plan of Care Review  3/18/2022 1201 by Ethel Foreman RN  Outcome: Met  Goal: Patient-Specific Goal (Individualized)  3/18/2022 1201 by Ethel Formean RN  Outcome: Met  Goal: Absence of Hospital-Acquired Illness or Injury  Outcome: Met  Goal: Optimal Comfort and Wellbeing  Outcome: Met  Goal: Readiness for Transition of Care  Outcome: Met     Problem: Diabetes Comorbidity  Goal: Blood Glucose Level Within Targeted Range  Outcome: Met     Problem: Skin Injury Risk Increased  Goal: Skin Health and Integrity  Outcome: Met         POC reviewed with patient. Patient verbalized understanding of POC. All comments and concerns addressed. Bed locked and low. Bed alarm set, Call light within reach. Safety precautions maintained. Will continue to monitor for changes to poc and clinical conditions.

## 2022-03-18 NOTE — DISCHARGE SUMMARY
[facility] - Neurosurgery ([facility])  [facility]  Discharge Summary      Patient Name: Samaria Verma  MRN: [id number]  Patient Class: IP- Inpatient  Admission Date: 3/15/2022  Hospital Length of Stay: 2 days  Discharge Date and Time:  03/18/2022   Attending Physician: Raul Zhang MD   Discharging Provider: Raul Zhang MD  Primary Care Provider: Dylan Martinez DO  [facility] Medicine Team: [facility]OS[facility] MED G Raul Zhang MD    HPI:   This is a 46 year old female with a past medical history of HFrEF (AICD), LBBB, cardiomyopathy, DM, and HTN who presents to the ED with chief complaint of blurry vision, left sided weakness/numbness, and chest pain. She reports that symptoms started this afternoon at 1530. She has not been taking any medications for the past year due to financial hardship and not having PCP. Patient states that she has never been prescribed insulin for DM control. In the ED patient afebrile and hemodynamically stable saturating well on room air. Stroke code was activated and Neurovasc evaluated patient. CTA MP performed and negative for LVO, high grade stenosis, vascular malformation, and hemorrhage. Patient has a pacemaker in which she was told it was not MRI compatible. Patient BG on presentation >600, AG 18, pH 7.39, beta-hydroxybutyrate negative and no ketones in urine. Patient started on IVFs and insulin gtt with resolution of her neurological symptoms. She was admitted to the care of medicine for further evaluation and management of suspected HHS.      * No surgery found *      Hospital Course:   Pt admitted to  team G, and was initiated on insulin gtt for HHS. BG gradually trended down, pt tolerating PO; insulin gtt discontinued and pt started on levemir with prandial insulin; endocrinology following. Electrolytes aggressively replaced. Overall she improved. Now stable for discharge as per endocrine's outlined insulin regimen. See below. 12 units BID lantus with novolog 6-8 units with  "MDSSI AC HS. She will need to follow up with endocrine OP. She has missed all her cardiac meds for the past year or 2 as per patient. Cardiology referral placed as well. Refills some of her cardioprudent meds.       Goals of Care Treatment Preferences:  Code Status: Full Code      Consults:   Consults (From admission, onward)        Status Ordering Provider     Inpatient consult to Registered Dietitian/Nutritionist  Once        Provider:  (Not yet assigned)    Completed ISA WICK     Inpatient consult to Endocrinology  Once        Provider:  (Not yet assigned)    Completed ASAF BURNETT          No new Assessment & Plan notes have been filed under this hospital service since the last note was generated.  Service: Hospital Medicine    Final Active Diagnoses:    Diagnosis Date Noted POA    Transient neurological symptoms [R29.818] 03/16/2022 Yes    Class 1 obesity in adult [E66.9] 03/15/2022 Yes    Mixed hyperlipidemia [E78.2] 03/15/2022 Yes    Chronic systolic congestive heart failure [I50.22] 03/07/2017 Yes    Primary hypertension [I10] 01/06/2016 Yes    Diabetes mellitus type 2 in obese [E11.69, E66.9] 01/06/2016 Yes      Problems Resolved During this Admission:    Diagnosis Date Noted Date Resolved POA    PRINCIPAL PROBLEM:  Hyperosmolar hyperglycemic state (HHS) [E11.00, E11.65] 03/15/2022 03/17/2022 Yes    Double vision [H53.2] 03/15/2022 03/16/2022 Yes    Chest pain [R07.9] 03/05/2015 03/16/2022 Yes       Discharged Condition: stable    Disposition: Home or Self Care    Follow Up:   Follow-up Information     Dylan Martinez DO. Go on 3/28/2022.    Specialty: Internal Medicine  Why: Hospital follow up, 1:20 pm  Contact information:  [address] [place] [id number] [phone]                       Patient Instructions:      BLOOD GLUCOSE MONITOR FOR HOME USE     Order Specific Question Answer Comments   Height: 5' 5" (1.651 m)    Weight: 86.2 kg (190 lb)    Does patient have " medical equipment at home? glucometer    Length of need (1-99 months): 99    Vendor: Other (use comments)    Expected Date of Delivery: 3/18/2022      Ambulatory referral/consult to Endocrinology   Standing Status: Future   Referral Priority: Urgent Referral Type: Consultation   Requested Specialty: Endocrinology   Number of Visits Requested: 1     Ambulatory referral/consult to Cardiology   Standing Status: Future   Referral Priority: Urgent Referral Type: Consultation   Referral Reason: Specialty Services Required   Requested Specialty: Cardiology   Number of Visits Requested: 1     Ambulatory referral/consult to Ophthalmology   Standing Status: Future   Referral Priority: Routine Referral Type: Consultation   Referral Reason: Specialty Services Required   Requested Specialty: Ophthalmology   Number of Visits Requested: 1       Significant Diagnostic Studies: all reviewed    Pending Diagnostic Studies:     None         Medications:  Reconciled Home Medications:      Medication List      START taking these medications    ACCU-CHEK GUIDE GLUCOSE METER Misc  Generic drug: blood-glucose meter  Use to test blood glucose 4 (four) times daily before meals and nightly.     ACCU-CHEK GUIDE TEST STRIPS Strp  Generic drug: blood sugar diagnostic  Use to test blood glucose 4 (four) times daily before meals and nightly.     ACCU-CHEK SOFTCLIX LANCETS Misc  Generic drug: lancets  Use to check blood glucose 4 (four) times daily before meals and nightly.     atorvastatin 40 MG tablet  Commonly known as: LIPITOR  Take 1 tablet (40 mg total) by mouth once daily.  Start taking on: March 19, 2022     BD INSULIN SYRINGE ULTRA-FINE 1 mL 31 gauge x 5/16 Syrg  Generic drug: insulin syringe-needle U-100  Use to inject insulin 5 times daily     LANTUS U-100 INSULIN 100 unit/mL injection  Generic drug: insulin glargine  Inject 12 Units into the skin 2 (two) times a day.     metFORMIN 500 MG ER 24hr tablet  Commonly known as:  GLUCOPHAGE-XR  Take 2 tablets (1,000 mg total) by mouth 2 (two) times daily with meals.  Replaces: metFORMIN 1000 MG tablet     * NovoLOG U-100 Insulin aspart 100 unit/mL injection  Generic drug: insulin aspart U-100  Inject 6-8 Units into the skin 4 (four) times daily before meals and nightly.     * insulin aspart U-100 100 unit/mL (3 mL) Inpn pen  Commonly known as: NovoLOG  Inject 1-10 Units into the skin before meals and at bedtime as needed (Hyperglycemia).         * This list has 2 medication(s) that are the same as other medications prescribed for you. Read the directions carefully, and ask your doctor or other care provider to review them with you.            CHANGE how you take these medications    carvediloL 6.25 MG tablet  Commonly known as: COREG  Take 1 tablet (6.25 mg total) by mouth 2 (two) times daily.  What changed:   · medication strength  · how much to take     ENTRESTO 24-26 mg per tablet  Generic drug: sacubitriL-valsartan  Take 1 tablet by mouth 2 (two) times daily.  Start taking on: March 20, 2022  What changed: These instructions start on March 20, 2022. If you are unsure what to do until then, ask your doctor or other care provider.     fluticasone propionate 50 mcg/actuation nasal spray  Commonly known as: FLONASE  1 spray (50 mcg total) by Each Nostril route once daily.  What changed: Another medication with the same name was removed. Continue taking this medication, and follow the directions you see here.     furosemide 40 MG tablet  Commonly known as: LASIX  Take 1 tablet (40 mg total) by mouth 2 (two) times daily.  What changed: medication strength        CONTINUE taking these medications    aspirin 81 MG EC tablet  Commonly known as: ECOTRIN  Take 1 tablet (81 mg total) by mouth once daily.     gabapentin 300 MG capsule  Commonly known as: NEURONTIN  Take 1 capsule (300 mg total) by mouth 2 (two) times daily.     nitroGLYCERIN 0.4 MG SL tablet  Commonly known as: NITROSTAT  Place 1  tablet (0.4 mg total) under the tongue every 5 (five) minutes as needed for Chest pain.     spironolactone 50 MG tablet  Commonly known as: ALDACTONE  Take 1 tablet (50 mg total) by mouth once daily.        STOP taking these medications    albuterol 90 mcg/actuation inhaler  Commonly known as: PROVENTIL/VENTOLIN HFA     azelastine 137 mcg (0.1 %) nasal spray  Commonly known as: ASTELIN     clonazePAM 0.5 MG tablet  Commonly known as: KlonoPIN     ergocalciferol 50,000 unit Cap  Commonly known as: ERGOCALCIFEROL     metFORMIN 1000 MG tablet  Commonly known as: GLUCOPHAGE  Replaced by: metFORMIN 500 MG ER 24hr tablet            Indwelling Lines/Drains at time of discharge:   Lines/Drains/Airways     None                 Time spent on the discharge of patient: > 30 minutes         Raul Zhang MD  Department of Hospital Medicine  [facility] ([facility])

## 2022-03-18 NOTE — DISCHARGE INSTRUCTIONS
- Levemir 12 units twice daily (may be changed to Lantus or Tresiba 24 units once daily at discharge if insurance allows)  - Continue mealtime aspart 8 units with moderate correction scale  - Patient may be discharged on the above regimen, patient asked to maintain glucose log with fasting and pre-meal values  - Agree with atorvastatin 40 mg daily, patient should continue this after discharge  - Patient will require basal/bolus insulin regimen at discharge given her A1c greater than 14; this was discussed with patient who understands  - Noted history of cardiomyopathy and EF of 25% on echo 03/16/2022; patient will benefit from SGLT2 inhibitor outpatient  - If blood glucose greater than 300, please ask patient not to eat food or drink anything other than water until correctional insulin has brought it back below 250  - Please ensure patient receives their p.r.n. insulin aspart if they eat a snack with more than 30 g of carbohydrate  - Patient will require close outpatient follow-up with Endocrinology, we will set her up with fellows discharge clinic appointment  - Outpatient, recommend checking C-peptide to assess pancreatic reserve, checking AMANDA-65 to assess autoimmune contribution, consider repeat trial of metformin, GLP-1 would be great for weight loss though unclear if patient would be able to tolerate with nausea, patient has history of cardiomyopathy with reduced EF of 25% and will benefit from SGLT2 inhibitor, certainly recommend CGM while on MDI if insurance allows, will need urinalysis to assess for proteinuria and referral to Ophthalmology do an annual dilated eye exam

## 2022-03-18 NOTE — PROGRESS NOTES
[facility] - Neurosurgery ([facility])  Endocrinology  Progress Note    Admit Date: 3/15/2022     46 year old female with T2DM, HFrEF (AICD), LBBB, cardiomyopathy, and HTN who presents with blurry vision, left sided weakness/numbness, and chest pain since afternoon of admission. She has not been taking any medications for the past year due to financial hardship and not having PCP.     - In the ED patient afebrile and hemodynamically stable saturating well on room air. Stroke code was activated and Neurovasc evaluated patient. CTA MP performed and negative for LVO, high grade stenosis, vascular malformation, and hemorrhage. Patient has a pacemaker in which she was told it was not MRI compatible.   - Pertinent labs on presentation: glucose >600, AG 18, pH 7.39, beta-hydroxybutyrate negative and no ketones in urine. Patient started on IVFs and insulin gtt with resolution of her neurological symptoms.     - Endocrinology consulted for evaluation and management of HHS    Regarding Diabetes Mellitus:    - Initially diagnosed with Type 2 diabetes mellitus: Since at least   - A1c greater than 14 on 03/15/2022, remote previous reading of 7.1 on 2016  - Patient reports having been on metformin in the past but could not tolerate secondary to diarrhea, has not been on any diabetes medications in several months    - Current symptoms:  Polyuria/polydipsia, visual changes, nausea/vomiting, numbness/tingling of the lower extremities more pronounced in the left  - Known diabetic complications: cardiovascular disease and cerebrovascular disease  - Cardiovascular risk factors: diabetes mellitus, dyslipidemia, obesity (BMI >= 30 kg/m2), and sedentary lifestyle      Interval HPI:   Overnight events:  No acute events reported overnight  Eatin%  Nausea: Yes  Hypoglycemia and intervention: No  Fever: No  TPN and/or TF: No  If yes, type of TF/TPN and rate: NA    BP (!) 117/59 (BP Location: Left arm, Patient Position: Lying)    "Pulse 70   Temp 97.3 °F (36.3 °C) (Oral)   Resp 16   Ht 5' 5" (1.651 m)   Wt 86.2 kg (190 lb)   LMP  (LMP Unknown)   SpO2 98%   Breastfeeding No   BMI 31.62 kg/m²     Labs Reviewed and Include    Recent Labs   Lab 03/17/22  0652   *   CALCIUM 9.4   ALBUMIN 3.2*   PROT 6.8   *   K 4.0   CO2 22*   CL 99   BUN 7   CREATININE 0.6   ALKPHOS 99   ALT 30   AST 23   BILITOT 0.3     Lab Results   Component Value Date    WBC 9.05 03/17/2022    HGB 12.1 03/17/2022    HCT 39.4 03/17/2022    MCV 85 03/17/2022     03/17/2022     No results for input(s): TSH, FREET4 in the last 168 hours.  Lab Results   Component Value Date    HGBA1C >14.0 (H) 03/15/2022       Nutritional status:   Body mass index is 31.62 kg/m².  Lab Results   Component Value Date    ALBUMIN 3.2 (L) 03/17/2022    ALBUMIN 2.0 (L) 03/16/2022    ALBUMIN 3.9 03/07/2017     No results found for: PREALBUMIN    Estimated Creatinine Clearance: 125.7 mL/min (based on SCr of 0.6 mg/dL).    Accu-Checks  Recent Labs     03/16/22  0610 03/16/22  0708 03/16/22  1158 03/16/22  1629 03/16/22 2001 03/17/22  0727 03/17/22  1058 03/17/22  1432 03/17/22  1622 03/17/22  1956   POCTGLUCOSE 238* 237* 366* 272* 278* 277* 362* 269* 272* 201*       Current Medications and/or Treatments Impacting Glycemic Control  Immunotherapy:    Immunosuppressants       None          Steroids:   Hormones (From admission, onward)                Start     Stop Route Frequency Ordered    03/15/22 2100  melatonin tablet 6 mg         -- Oral Nightly PRN 03/15/22 2001          Pressors:    Autonomic Drugs (From admission, onward)                None          Hyperglycemia/Diabetes Medications:   Antihyperglycemics (From admission, onward)                Start     Stop Route Frequency Ordered    03/17/22 2100  insulin detemir U-100 pen 12 Units         -- SubQ 2 times daily 03/17/22 2002 03/17/22 1645  insulin aspart U-100 pen 6-8 Units         -- SubQ 3 times daily with meals " 22 1424    22 1526  insulin aspart U-100 pen 1-10 Units         -- SubQ Before meals & nightly PRN 22 1426            ASSESSMENT and PLAN    Diabetes mellitus type 2 in obese  Key History and Diagnostic Findings  - Initially diagnosed with Type 2 diabetes mellitus: Since at least   - A1c greater than 14 on 03/15/2022, remote previous reading of 7.1 on 2016  - Patient reports no recent history of diabetes medications, was on metformin in the past but could not tolerate secondary to diarrhea  - Weight based dosin kg x 0.5 = 43 TDD x 0.5 = 21 basal / 21 prandial  - 1700/TDD = 40 (estimated insulin sensitivity factor)  - 450/TDD = 10.5 (estimated starting carb ratio for prandial dosing)  - Glucose Goals:  160-200mg/dL  - HHS resolved, started on basal/bolus therapy evening of 2022  - 24 hour glucose trend:  Hyperglycemia in the low 300s and mid 200s since starting MDI yesterday evening    Plan  - Increase Levemir from 10 up to 12 units twice daily (may be changed to Lantus or Tresiba 24 units once daily at discharge if insurance allows)  - Continue mealtime aspart 8 units with moderate correction scale  - Patient may be discharged on the above regimen, patient asked to maintain glucose log with fasting and pre-meal values  - Agree with atorvastatin 40 mg daily, patient should continue this after discharge  - Patient will require basal/bolus insulin regimen at discharge given her A1c greater than 14; this was discussed with patient who understands  - Noted history of cardiomyopathy and EF of 25% on echo 2022; patient will benefit from SGLT2 inhibitor outpatient    - Hypoglycemia protocol in place  - If blood glucose greater than 300, please ask patient not to eat food or drink anything other than water until correctional insulin has brought it back below 250  - Please ensure patient receives their p.r.n. insulin aspart if they eat a snack with more than 30 g of  carbohydrate    - Patient will require close outpatient follow-up with Endocrinology, we will set her up with fellows discharge clinic appointment  - Outpatient, recommend checking C-peptide to assess pancreatic reserve, checking AMANDA-65 to assess autoimmune contribution, consider repeat trial of metformin, GLP-1 would be great for weight loss though unclear if patient would be able to tolerate with nausea, patient has history of cardiomyopathy with reduced EF of 25% and will benefit from SGLT2 inhibitor, certainly recommend CGM while on MDI if insurance allows, will need urinalysis to assess for proteinuria and referral to Ophthalmology do an annual dilated eye exam    Mixed hyperlipidemia  Key History and Diagnostic Findings  -  with elevated triglycerides of 438 on 02/19/2021     Plan  - Agree with atorvastatin 40 mg daily per neurology  - Recheck lipid panel in 3 months after continued statin use    Chronic systolic congestive heart failure  - Echocardiogram 03/16/2022 demonstrates EF 25%  - Please see plan as above        Parker Main DO  [facility], [address]th Floor  [address] [place], [place], [address]    Office: [phone]  Fax: [phone]    Disclaimer: This note has been generated using voice-recognition software. There may be typographical errors that have been missed during proof-reading.    The above history labs imaging impression and plan were discussed with attending physician who is in agreement and also took part in this patient's care.  I personally reviewed all of the patients available medications, labs, imaging, vitals, allergies, medical history.

## 2022-03-18 NOTE — PLAN OF CARE
Problem: Adult Inpatient Plan of Care  Goal: Absence of Hospital-Acquired Illness or Injury  Outcome: Ongoing, Progressing  Goal: Optimal Comfort and Wellbeing  Outcome: Ongoing, Progressing     Problem: Diabetes Comorbidity  Goal: Blood Glucose Level Within Targeted Range  Outcome: Ongoing, Progressing     Problem: Skin Injury Risk Increased  Goal: Skin Health and Integrity  Outcome: Ongoing, Progressing   Poc was reviewed with patient at bedside. Patient is alert and denies pain, distress or SOB. Bed is in the lowest position with 3 siderails up and wheels locked. Vitals were taken q4h and rounding was preformed q2h. All safety measures were maintained.

## 2022-03-18 NOTE — ASSESSMENT & PLAN NOTE
Key History and Diagnostic Findings  - Initially diagnosed with Type 2 diabetes mellitus: Since at least   - A1c greater than 14 on 03/15/2022, remote previous reading of 7.1 on 2016  - Patient reports no recent history of diabetes medications, was on metformin in the past but could not tolerate secondary to diarrhea  - Weight based dosin kg x 0.5 = 43 TDD x 0.5 = 21 basal / 21 prandial  - 1700/TDD = 40 (estimated insulin sensitivity factor)  - 450/TDD = 10.5 (estimated starting carb ratio for prandial dosing)  - Glucose Goals:  160-200mg/dL  - HHS resolved, started on basal/bolus therapy evening of 2022  - 24 hour glucose trend:  Hyperglycemia in the low 300s and mid 200s since starting MDI yesterday evening    Plan  - Increase Levemir from 10 up to 12 units twice daily (may be changed to Lantus or Tresiba 24 units once daily at discharge if insurance allows)  - Continue mealtime aspart 8 units with moderate correction scale  - Patient may be discharged on the above regimen, patient asked to maintain glucose log with fasting and pre-meal values  - Agree with atorvastatin 40 mg daily, patient should continue this after discharge  - Patient will require basal/bolus insulin regimen at discharge given her A1c greater than 14; this was discussed with patient who understands  - Noted history of cardiomyopathy and EF of 25% on echo 2022; patient will benefit from SGLT2 inhibitor outpatient    - Hypoglycemia protocol in place  - If blood glucose greater than 300, please ask patient not to eat food or drink anything other than water until correctional insulin has brought it back below 250  - Please ensure patient receives their p.r.n. insulin aspart if they eat a snack with more than 30 g of carbohydrate    - Patient will require close outpatient follow-up with Endocrinology, we will set her up with fellows discharge clinic appointment  - Outpatient, recommend checking C-peptide to assess  pancreatic reserve, checking AMANDA-65 to assess autoimmune contribution, consider repeat trial of metformin, GLP-1 would be great for weight loss though unclear if patient would be able to tolerate with nausea, patient has history of cardiomyopathy with reduced EF of 25% and will benefit from SGLT2 inhibitor, certainly recommend CGM while on MDI if insurance allows, will need urinalysis to assess for proteinuria and referral to Ophthalmology do an annual dilated eye exam

## 2022-03-18 NOTE — PLAN OF CARE
[facility] - Neurosurgery (Hospital)  Discharge Final Note    Primary Care Provider: Dylan Martinez DO  Expected Discharge Date: 3/18/2022  Patient medically ready for discharge to home today.  Hospital follow up scheduled, 3/28 at 1:20 pm with Dr. Martinez. Added to AVS.    Final Discharge Note (most recent)     Final Note - 03/18/22 1029        Final Note    Assessment Type Final Discharge Note     Anticipated Discharge Disposition Home or Self Care     Hospital Resources/Appts/Education Provided Provided patient/caregiver with written discharge plan information;Appointments scheduled and added to AVS;Provided education on problems/symptoms using teachback        Post-Acute Status    Post-Acute Authorization Other     Other Status Awaiting f/u Appts     Discharge Delays None known at this time               Important Message from Medicare  Important Message from Medicare regarding Discharge Appeal Rights: Given to patient/caregiver, Explained to patient/caregiver, Signed/date by patient/caregiver   Date IMM was signed: 03/18/22  Time IMM was signed: 0900  Referral Info (most recent)     Referral Info    No documentation.               Contact Info     Dylan Martinez DO   Specialty: Internal Medicine   Relationship: PCP - General    [address] [facility]  [place] [address]   Phone: [phone]       Next Steps: Go on 3/28/2022    Instructions: Hospital follow up, 1:20 pm        Future Appointments   Date Time Provider Department Center   3/28/2022  1:20 PM Dylan Martinez DO [facility]   4/20/2022  8:00 AM FELLOW HOSPITAL DISCHARGE CLINIC [facility] HERNANDEZ Haq  Case Management  Ext: [phone]  03/18/2022

## 2022-03-25 ENCOUNTER — TELEPHONE (OUTPATIENT)
Dept: ENDOCRINOLOGY | Facility: CLINIC | Age: 47
End: 2022-03-25
Payer: MEDICARE

## 2022-03-25 NOTE — TELEPHONE ENCOUNTER
Pt just got out the hospital on the 18 of March. Pt BS is running from 350-400 she is having diarrhea and cant keep anything down everything she eats comes up even when she doesn't eat . She's  Checking her  BS EVERY 3 HOURS AND ITS STILL OVER 350 . YES SHE IS OFF HER DIET ! I TOLD PT TO GO TO THE ER PLEASE ADVISE .

## 2022-03-25 NOTE — TELEPHONE ENCOUNTER
I did get the info from the MD on call which is Dr. Dow and she also agreed with me to tell pt go to the ER so that she can get further evaluation since she can't keep anything down . I did call pt let her know

## 2022-03-25 NOTE — TELEPHONE ENCOUNTER
----- Message from Austin Baker sent at 3/25/2022  3:08 PM CDT -----  Contact: [phone]  Pt was discharged from the hospital and she have been experiencing extremely high levels of blood sugar of 350-400 and was told to call in. Pt would like a call back so she know what she needs to do next.     [phone]

## 2022-03-28 ENCOUNTER — LAB VISIT (OUTPATIENT)
Dept: LAB | Facility: HOSPITAL | Age: 47
End: 2022-03-28
Attending: INTERNAL MEDICINE
Payer: MEDICARE

## 2022-03-28 ENCOUNTER — TELEPHONE (OUTPATIENT)
Dept: FAMILY MEDICINE | Facility: CLINIC | Age: 47
End: 2022-03-28
Payer: MEDICARE

## 2022-03-28 ENCOUNTER — OFFICE VISIT (OUTPATIENT)
Dept: FAMILY MEDICINE | Facility: CLINIC | Age: 47
End: 2022-03-28
Payer: MEDICARE

## 2022-03-28 VITALS
DIASTOLIC BLOOD PRESSURE: 80 MMHG | OXYGEN SATURATION: 97 % | SYSTOLIC BLOOD PRESSURE: 116 MMHG | HEART RATE: 73 BPM | BODY MASS INDEX: 28.57 KG/M2 | HEIGHT: 65 IN | WEIGHT: 171.5 LBS

## 2022-03-28 DIAGNOSIS — I42.8 NICM (NONISCHEMIC CARDIOMYOPATHY): ICD-10-CM

## 2022-03-28 DIAGNOSIS — Z12.11 SCREENING FOR COLON CANCER: ICD-10-CM

## 2022-03-28 DIAGNOSIS — Z09 HOSPITAL DISCHARGE FOLLOW-UP: Primary | ICD-10-CM

## 2022-03-28 DIAGNOSIS — E11.65 TYPE 2 DIABETES MELLITUS WITH HYPERGLYCEMIA, WITH LONG-TERM CURRENT USE OF INSULIN: ICD-10-CM

## 2022-03-28 DIAGNOSIS — Z79.4 TYPE 2 DIABETES MELLITUS WITH HYPERGLYCEMIA, WITH LONG-TERM CURRENT USE OF INSULIN: ICD-10-CM

## 2022-03-28 LAB
ALBUMIN/CREAT UR: 115.4 UG/MG (ref 0–30)
CREAT UR-MCNC: 13 MG/DL (ref 15–325)
MICROALBUMIN UR DL<=1MG/L-MCNC: 15 UG/ML

## 2022-03-28 PROCEDURE — 99214 OFFICE O/P EST MOD 30 MIN: CPT | Mod: S$GLB,,, | Performed by: INTERNAL MEDICINE

## 2022-03-28 PROCEDURE — 99999 PR PBB SHADOW E&M-EST. PATIENT-LVL V: ICD-10-PCS | Mod: PBBFAC,,, | Performed by: INTERNAL MEDICINE

## 2022-03-28 PROCEDURE — 82043 UR ALBUMIN QUANTITATIVE: CPT | Performed by: INTERNAL MEDICINE

## 2022-03-28 PROCEDURE — 82570 ASSAY OF URINE CREATININE: CPT | Performed by: INTERNAL MEDICINE

## 2022-03-28 PROCEDURE — 99214 PR OFFICE/OUTPT VISIT, EST, LEVL IV, 30-39 MIN: ICD-10-PCS | Mod: S$GLB,,, | Performed by: INTERNAL MEDICINE

## 2022-03-28 PROCEDURE — 99999 PR PBB SHADOW E&M-EST. PATIENT-LVL V: CPT | Mod: PBBFAC,,, | Performed by: INTERNAL MEDICINE

## 2022-03-28 RX ORDER — SERTRALINE HYDROCHLORIDE 50 MG/1
TABLET, FILM COATED ORAL
Status: ON HOLD | COMMUNITY
End: 2022-06-08

## 2022-03-28 NOTE — PROGRESS NOTES
Patient ID: Samaria Verma is a 46 y.o. female.    Chief Complaint: Hospital Follow Up and Flank Pain      Assessment and Plan      Diarrhea-stop metformin, treat diabetes, if continues will consider stool studies and GI referral    Diabetes-Increase Levemir to 30 units once daily, increase NovoLog to 10 units t.i.d..  Gave instructions on how to increase Levemir to achieve goal fasting blood sugar.  Discussed to stop at Levemir 45 units daily or when goal is reached.  Will see about getting CGM.  Diabetes Education.    Referral to cardiology for nonischemic cardiomyopathy.      Follow-up in 2 weeks    1. Hospital discharge follow-up     2. NICM (nonischemic cardiomyopathy)  Ambulatory referral/consult to Cardiology   3. Type 2 diabetes mellitus with hyperglycemia, with long-term current use of insulin  blood-glucose meter,continuous (DEXCOM ) Misc    blood-glucose sensor (DEXCOM G5-G4 SENSOR) Bhumika    blood-glucose transmitter (DEXCOM G5 TRANSMITTER) Bhumika    Microalbumin/Creatinine Ratio, Urine    Ambulatory referral/consult to Diabetes Education   4. Screening for colon cancer  Fecal Immunochemical Test (iFOBT)     HPI     Past medical history of hypertension, type 2 diabetes, nonischemic cardiomyopathy, AICD placement, diabetic neuropathy, depression    Seen at the ED on 3/25, antibiotic prescribed for UTI    Prior to that she was admitted for hyperglycemic hyperosmolar syndrome.  See note.     She is still having nausea and vomiting and severe diarrhea. This has been going on for 1 yr. Thinks metformin has worsened diarrhea.     Type 2 diabetes-levemir 12 unit BID, NovoLog 8 units t.i.d. with sliding scale, metformin, history of HHS, last A1c greater than 14. Fasting, pre meal, post meal glucose is in the 200-300 range.    Hypertension- controlled     Hyperlipidemia-atorvastatin    CHF- carvedilol, Entresto, Lasix    Depression-sertraline    Neuropathy-gabapentin    Colon cancer screening- [employer] was given  to patient on 3/28/2022     Second Pfizer COVID vaccine  Tetanus vaccine-  Eye exam scheduled    Hypertension Medications             carvediloL (COREG) 6.25 MG tablet Take 1 tablet (6.25 mg total) by mouth 2 (two) times daily.    furosemide (LASIX) 40 MG tablet Take 1 tablet (40 mg total) by mouth 2 (two) times daily.    nitroGLYCERIN (NITROSTAT) 0.4 MG SL tablet Place 1 tablet (0.4 mg total) under the tongue every 5 (five) minutes as needed for Chest pain.    sacubitriL-valsartan (ENTRESTO) 24-26 mg per tablet Take 1 tablet by mouth 2 (two) times daily.    spironolactone (ALDACTONE) 50 MG tablet Take 1 tablet (50 mg total) by mouth once daily.        Hyperlipidemia Medications             atorvastatin (LIPITOR) 40 MG tablet Take 1 tablet (40 mg total) by mouth once daily.        Diabetes Medications             insulin (LANTUS SOLOSTAR U-100 INSULIN) glargine 100 units/mL (3mL) SubQ pen Inject 24 Units into the skin every evening.    insulin aspart U-100 (NOVOLOG FLEXPEN U-100 INSULIN) 100 unit/mL (3 mL) InPn pen Inject 6-8 Units into the skin 4 (four) times daily with meals and nightly.    insulin aspart U-100 (NOVOLOG) 100 unit/mL (3 mL) InPn pen Inject 1-10 Units into the skin before meals and at bedtime as needed (Hyperglycemia).    metFORMIN (GLUCOPHAGE-XR) 500 MG ER 24hr tablet Take 2 tablets (1,000 mg total) by mouth 2 (two) times daily with meals.          Review of Systems   Constitutional: Negative for fever.   Respiratory: Negative for shortness of breath.    Cardiovascular: Negative for chest pain.   Gastrointestinal: Negative for abdominal pain.        Vitals:    03/28/22 1313   BP: 116/80   Pulse: 73     Physical Exam  Cardiovascular:      Rate and Rhythm: Normal rate and regular rhythm.      Heart sounds: No murmur heard.    No gallop.   Pulmonary:      Breath sounds: Normal breath sounds. No wheezing or rhonchi.   Abdominal:      General: Bowel sounds are normal.      Palpations: Abdomen is soft.       Tenderness: There is no abdominal tenderness.   Musculoskeletal:         General: Normal range of motion.      Cervical back: Neck supple.   Skin:     General: Skin is warm.      Findings: No rash.   Neurological:      Mental Status: She is alert.   Psychiatric:         Behavior: Behavior normal.         I personally reviewed past medical, family and social history.  Medication List with Changes/Refills   New Medications    BLOOD-GLUCOSE METER,CONTINUOUS (DEXCOM ) MISC    Use to check blood glucose at least 4 times a day    BLOOD-GLUCOSE SENSOR (DEXCOM G5-G4 SENSOR) LONG    Apply 1 sensor every 10 days    BLOOD-GLUCOSE TRANSMITTER (DEXCOM G5 TRANSMITTER) LONG    Replace every 90 days   Current Medications    ASPIRIN (ECOTRIN) 81 MG EC TABLET    Take 1 tablet (81 mg total) by mouth once daily.    ATORVASTATIN (LIPITOR) 40 MG TABLET    Take 1 tablet (40 mg total) by mouth once daily.    BLOOD SUGAR DIAGNOSTIC STRP    Use to test blood glucose 4 (four) times daily before meals and nightly.    BLOOD-GLUCOSE METER (ACCU-CHEK GUIDE GLUCOSE METER) MISC    Use to test blood glucose 4 (four) times daily before meals and nightly.    CARVEDILOL (COREG) 6.25 MG TABLET    Take 1 tablet (6.25 mg total) by mouth 2 (two) times daily.    CEPHALEXIN (KEFLEX) 500 MG CAPSULE    Take 1 capsule (500 mg total) by mouth 4 (four) times daily. for 5 days    FAMOTIDINE (PEPCID) 20 MG TABLET    Take 1 tablet (20 mg total) by mouth 2 (two) times daily. for 5 days    FLUTICASONE PROPIONATE (FLONASE) 50 MCG/ACTUATION NASAL SPRAY    1 spray (50 mcg total) by Each Nostril route once daily.    FUROSEMIDE (LASIX) 40 MG TABLET    Take 1 tablet (40 mg total) by mouth 2 (two) times daily.    GABAPENTIN (NEURONTIN) 300 MG CAPSULE    Take 1 capsule (300 mg total) by mouth 2 (two) times daily.    INSULIN (LANTUS SOLOSTAR U-100 INSULIN) GLARGINE 100 UNITS/ML (3ML) SUBQ PEN    Inject 24 Units into the skin every evening.    INSULIN ASPART U-100  "(NOVOLOG FLEXPEN U-100 INSULIN) 100 UNIT/ML (3 ML) INPN PEN    Inject 6-8 Units into the skin 4 (four) times daily with meals and nightly.    INSULIN ASPART U-100 (NOVOLOG) 100 UNIT/ML (3 ML) INPN PEN    Inject 1-10 Units into the skin before meals and at bedtime as needed (Hyperglycemia).    LANCETS (ACCU-CHEK SOFTCLIX LANCETS) MISC    Use to check blood glucose 4 (four) times daily before meals and nightly.    METFORMIN (GLUCOPHAGE-XR) 500 MG ER 24HR TABLET    Take 2 tablets (1,000 mg total) by mouth 2 (two) times daily with meals.    NITROGLYCERIN (NITROSTAT) 0.4 MG SL TABLET    Place 1 tablet (0.4 mg total) under the tongue every 5 (five) minutes as needed for Chest pain.    ONDANSETRON (ZOFRAN ODT) 4 MG TBDL    Take 1 tablet (4 mg total) by mouth every 6 (six) hours as needed (nausea).    PEN NEEDLE, DIABETIC 31 GAUGE X 5/16" NDLE    1 each by Misc.(Non-Drug; Combo Route) route 2 (two) times daily with meals.    SACUBITRIL-VALSARTAN (ENTRESTO) 24-26 MG PER TABLET    Take 1 tablet by mouth 2 (two) times daily.    SERTRALINE (ZOLOFT) 50 MG TABLET    sertraline 50 mg tablet    SPIRONOLACTONE (ALDACTONE) 50 MG TABLET    Take 1 tablet (50 mg total) by mouth once daily.               "

## 2022-03-28 NOTE — PATIENT INSTRUCTIONS
Increase levemir to 30 units once/ day. Then increase levemir by 2-3 units every 2-3 days until you reach 45 units or you reach goal fasting BG of .

## 2022-03-29 ENCOUNTER — DOCUMENTATION ONLY (OUTPATIENT)
Dept: CARDIOLOGY | Facility: HOSPITAL | Age: 47
End: 2022-03-29
Payer: MEDICARE

## 2022-03-29 ENCOUNTER — CLINICAL SUPPORT (OUTPATIENT)
Dept: CARDIOLOGY | Facility: HOSPITAL | Age: 47
End: 2022-03-29
Attending: INTERNAL MEDICINE
Payer: MEDICARE

## 2022-03-29 ENCOUNTER — OFFICE VISIT (OUTPATIENT)
Dept: CARDIOLOGY | Facility: CLINIC | Age: 47
End: 2022-03-29
Payer: MEDICARE

## 2022-03-29 VITALS
SYSTOLIC BLOOD PRESSURE: 113 MMHG | HEIGHT: 65 IN | BODY MASS INDEX: 28.83 KG/M2 | WEIGHT: 173.06 LBS | HEART RATE: 81 BPM | DIASTOLIC BLOOD PRESSURE: 74 MMHG

## 2022-03-29 DIAGNOSIS — Z86.79 HISTORY OF CARDIOMYOPATHY: ICD-10-CM

## 2022-03-29 DIAGNOSIS — E78.1 HYPERTRIGLYCERIDEMIA: ICD-10-CM

## 2022-03-29 DIAGNOSIS — Z95.810 ICD (IMPLANTABLE CARDIOVERTER-DEFIBRILLATOR) IN PLACE: ICD-10-CM

## 2022-03-29 DIAGNOSIS — I42.8 NICM (NONISCHEMIC CARDIOMYOPATHY): ICD-10-CM

## 2022-03-29 DIAGNOSIS — I50.22 CHRONIC SYSTOLIC CONGESTIVE HEART FAILURE: ICD-10-CM

## 2022-03-29 DIAGNOSIS — I10 PRIMARY HYPERTENSION: Primary | ICD-10-CM

## 2022-03-29 DIAGNOSIS — E11.69 DIABETES MELLITUS TYPE 2 IN OBESE: ICD-10-CM

## 2022-03-29 DIAGNOSIS — E78.2 MIXED HYPERLIPIDEMIA: ICD-10-CM

## 2022-03-29 DIAGNOSIS — Z95.810 ICD (IMPLANTABLE CARDIOVERTER-DEFIBRILLATOR) IN PLACE: Primary | ICD-10-CM

## 2022-03-29 DIAGNOSIS — E66.9 DIABETES MELLITUS TYPE 2 IN OBESE: ICD-10-CM

## 2022-03-29 PROCEDURE — 99999 PR PBB SHADOW E&M-EST. PATIENT-LVL V: ICD-10-PCS | Mod: PBBFAC,,, | Performed by: INTERNAL MEDICINE

## 2022-03-29 PROCEDURE — 93284 CARDIAC DEVICE CHECK - IN CLINIC & HOSPITAL: ICD-10-PCS | Mod: 26,,, | Performed by: INTERNAL MEDICINE

## 2022-03-29 PROCEDURE — 99999 PR PBB SHADOW E&M-EST. PATIENT-LVL V: CPT | Mod: PBBFAC,,, | Performed by: INTERNAL MEDICINE

## 2022-03-29 PROCEDURE — 93284 PRGRMG EVAL IMPLANTABLE DFB: CPT | Mod: 26,,, | Performed by: INTERNAL MEDICINE

## 2022-03-29 PROCEDURE — 99204 OFFICE O/P NEW MOD 45 MIN: CPT | Mod: S$GLB,,, | Performed by: INTERNAL MEDICINE

## 2022-03-29 PROCEDURE — 99204 PR OFFICE/OUTPT VISIT, NEW, LEVL IV, 45-59 MIN: ICD-10-PCS | Mod: S$GLB,,, | Performed by: INTERNAL MEDICINE

## 2022-03-29 NOTE — PROGRESS NOTES
Subjective:    Patient ID:  Samaria Verma is a 46 y.o. female patient here for evaluation Establish Care and Edema      History of Present Illness:  The patient cardiac evaluation.  Followed by outside Cardiology initially, diagnosis of nonischemic dilated cardiomyopathy entertained 20/14.  Subsequent defibrillator placed.  Recent hospitalization for diabetes mellitus.  Echo at that time revealed EF 25%.  Stable DAVIS.  No PND orthopnea.  No defibrillator shocks.  No edema.  Patient currently on goal directed maximal medical management.     No chronic kidney disease.  Hepatic disease.  No history of GI bleed or blood transfusion.  No history of DVT PE.    No history of CA.  Question past history of CVA, no neurologic sequelae.        Review of patient's allergies indicates:   Allergen Reactions    Lisinopril Rash       Past Medical History:   Diagnosis Date    Anticoagulant long-term use     CHF (congestive heart failure)     Chronic systolic congestive heart failure 3/7/2017    Diabetes mellitus     Diabetes mellitus type 2 in obese 1/6/2016    Hypertension     Primary hypertension 1/6/2016     Past Surgical History:   Procedure Laterality Date    BREAST SURGERY Bilateral     reduction    CARDIAC DEFIBRILLATOR PLACEMENT  2018    INSERTION OF PACEMAKER  2018    TUBAL LIGATION       Social History     Tobacco Use    Smoking status: Never Smoker    Smokeless tobacco: Never Used   Substance Use Topics    Alcohol use: No    Drug use: No        Review of Systems:    As noted in HPI in addition         REVIEW OF SYSTEMS  Review of Systems   Constitutional: Negative for decreased appetite, diaphoresis, night sweats, weight gain and weight loss.   HENT: Negative for nosebleeds and odynophagia.    Eyes: Negative for double vision and photophobia.   Cardiovascular: Negative for chest pain, claudication, cyanosis, dyspnea on exertion, irregular heartbeat, leg swelling, near-syncope, orthopnea, palpitations,  paroxysmal nocturnal dyspnea and syncope.        Stomach swelling   Respiratory: Negative for cough, hemoptysis, shortness of breath and wheezing.    Hematologic/Lymphatic: Negative for adenopathy.   Skin: Negative for flushing, skin cancer and suspicious lesions.   Musculoskeletal: Negative for gout, myalgias and neck pain.   Gastrointestinal: Negative for abdominal pain, heartburn, hematemesis and hematochezia.   Genitourinary: Negative for bladder incontinence, hesitancy and nocturia.   Neurological: Positive for dizziness. Negative for focal weakness, headaches, light-headedness and paresthesias.   Psychiatric/Behavioral: Negative for memory loss and substance abuse.       Objective:        Vitals:    03/29/22 1352   BP: 113/74   Pulse: 81       Lab Results   Component Value Date    WBC 10.10 03/18/2022    HGB 12.0 03/18/2022    HCT 39.8 03/18/2022     03/18/2022    CHOL 249 (H) 02/19/2021    TRIG 438 (H) 02/19/2021    HDL 34 02/19/2021    ALT 28 03/18/2022    AST 23 03/18/2022     (L) 03/18/2022    K 3.5 03/18/2022    CL 99 03/18/2022    CREATININE 0.6 03/18/2022    BUN 9 03/18/2022    CO2 24 03/18/2022    TSH 2.100 01/06/2016    INR 1.1 03/07/2017    HGBA1C >14.0 (H) 03/15/2022      CARDIOGRAM RESULTS  Results for orders placed during the hospital encounter of 03/15/22    Echo    Interpretation Summary  · The left ventricle is normal in size with severely decreased systolic function. The estimated ejection fraction is 25%.  · There is severe left ventricular global hypokinesis.  · Normal right ventricular size with normal right ventricular systolic function.  · Left ventricular diastolic dysfunction.  · Mild left atrial enlargement.  · Normal central venous pressure (3 mmHg).        CURRENT/PREVIOUS VISIT EKG  Results for orders placed or performed during the hospital encounter of 03/15/22   EKG 12-lead    Collection Time: 03/15/22  8:06 PM    Narrative    Test Reason : R07.89,    Vent. Rate : 095  BPM     Atrial Rate : 095 BPM     P-R Int : 176 ms          QRS Dur : 110 ms      QT Int : 384 ms       P-R-T Axes : 035 144 -21 degrees     QTc Int : 482 ms    Normal sinus rhythm  Septal infarct (cited on or before 15-MAR-2022)  Lateral infarct (cited on or before 15-MAR-2022)  Marked ST abnormality, possible inferior subendocardial injury  Abnormal ECG  When compared with ECG of 15-MAR-2022 15:57,  Serial changes of Anterolateral infarct  Confirmed by Fab James MD ([id number]) on 3/16/2022 3:23:17 PM    Referred By: MAURICE   SELF           Confirmed By:Horace James MD     No valid procedures specified.   No results found for this or any previous visit.    No valid procedures specified.    PHYSICAL EXAM  CONSTITUTIONAL: Well built, well nourished in no apparent distress  NECK: no carotid bruit, no JVD  LUNGS: CTA  CHEST WALL: no tenderness,  HEART: regular rate and rhythm, S1, S2 DISTANT.  GRADE 1/6 HOLOSYSTOLIC MURMUR LOWER LEFT STERNAL BORDER CARDIAC APEX.  SOFT S3.  DISPLACED PMI.  ABDOMEN: soft, non-tender; bowel sounds normal; no masses,  no organomegaly  EXTREMITIES: Extremities normal, no edema, no calf tenderness noted  VASCULAR EXAM: 2 PLUS UPPER AND LOWER EXT PULSES  NEURO: AAO X 3, NO ACUTE FOCAL OR LATERALIZING FINDINGS    I HAVE REVIEWED :    The vital signs, nurses notes, and all the pertinent radiology and labs.         Current Outpatient Medications   Medication Instructions    aspirin (ECOTRIN) 81 mg, Oral, Daily    atorvastatin (LIPITOR) 40 mg, Oral, Daily    blood sugar diagnostic Strp Use to test blood glucose 4 (four) times daily before meals and nightly.    blood-glucose meter (ACCU-CHEK GUIDE GLUCOSE METER) Misc Use to test blood glucose 4 (four) times daily before meals and nightly.    blood-glucose meter,continuous (DEXCOM ) Misc Use to check blood glucose at least 4 times a day    blood-glucose sensor (DEXCOM G5-G4 SENSOR) Bhumika Apply 1 sensor every 10 days     "blood-glucose transmitter (DEXCOM G5 TRANSMITTER) Bhumika Replace every 90 days    carvediloL (COREG) 6.25 mg, Oral, 2 times daily    cephALEXin (KEFLEX) 500 mg, Oral, 4 times daily    famotidine (PEPCID) 20 mg, Oral, 2 times daily    furosemide (LASIX) 40 mg, Oral, 2 times daily    gabapentin (NEURONTIN) 300 mg, Oral, 2 times daily    insulin aspart U-100 (NOVOLOG) 1-10 Units, Subcutaneous, Before meals & nightly PRN    lancets (ACCU-CHEK SOFTCLIX LANCETS) Misc Use to check blood glucose 4 (four) times daily before meals and nightly.    LANTUS SOLOSTAR U-100 INSULIN 24 Units, Subcutaneous, Nightly    nitroGLYCERIN (NITROSTAT) 0.4 mg, Sublingual, Every 5 min PRN    NovoLOG Flexpen U-100 Insulin 6-8 Units, Subcutaneous, 4 times daily with meals & nightly    ondansetron (ZOFRAN ODT) 4 mg, Oral, Every 6 hours PRN    pen needle, diabetic 31 gauge x 5/16" Ndle 1 each, Misc.(Non-Drug; Combo Route), 2 times daily with meals    sacubitriL-valsartan (ENTRESTO) 24-26 mg per tablet 1 tablet, Oral, 2 times daily    sertraline (ZOLOFT) 50 MG tablet sertraline 50 mg tablet    spironolactone (ALDACTONE) 50 mg, Oral, Daily          Assessment:   NONISCHEMIC DILATED CARDIOMYOPATHY.  EF 25%.  DIABETES MELLITUS, DYSLIPIDEMIA, HYPERTENSION    HISTORY OF AICD        Plan:   ECHO 3/22 WITH EF 25%.  PATIENT NEEDS AICD CLINIC CAN INTERROGATION.  FOUR MONTHS        No follow-ups on file.       "

## 2022-04-01 ENCOUNTER — PATIENT MESSAGE (OUTPATIENT)
Dept: ADMINISTRATIVE | Facility: HOSPITAL | Age: 47
End: 2022-04-01
Payer: MEDICARE

## 2022-04-01 ENCOUNTER — PATIENT OUTREACH (OUTPATIENT)
Dept: ADMINISTRATIVE | Facility: HOSPITAL | Age: 47
End: 2022-04-01
Payer: MEDICARE

## 2022-04-05 ENCOUNTER — OFFICE VISIT (OUTPATIENT)
Dept: FAMILY MEDICINE | Facility: CLINIC | Age: 47
End: 2022-04-05
Payer: MEDICARE

## 2022-04-05 VITALS
OXYGEN SATURATION: 96 % | SYSTOLIC BLOOD PRESSURE: 136 MMHG | HEART RATE: 81 BPM | DIASTOLIC BLOOD PRESSURE: 80 MMHG | WEIGHT: 176.56 LBS | HEIGHT: 65 IN | BODY MASS INDEX: 29.42 KG/M2

## 2022-04-05 DIAGNOSIS — R19.7 DIARRHEA, UNSPECIFIED TYPE: ICD-10-CM

## 2022-04-05 DIAGNOSIS — R21 RASH IN ADULT: ICD-10-CM

## 2022-04-05 DIAGNOSIS — R11.0 NAUSEA: ICD-10-CM

## 2022-04-05 DIAGNOSIS — E11.65 TYPE 2 DIABETES MELLITUS WITH HYPERGLYCEMIA, WITH LONG-TERM CURRENT USE OF INSULIN: ICD-10-CM

## 2022-04-05 DIAGNOSIS — Z79.4 TYPE 2 DIABETES MELLITUS WITH HYPERGLYCEMIA, WITH LONG-TERM CURRENT USE OF INSULIN: ICD-10-CM

## 2022-04-05 PROCEDURE — 99214 PR OFFICE/OUTPT VISIT, EST, LEVL IV, 30-39 MIN: ICD-10-PCS | Mod: S$GLB,,, | Performed by: INTERNAL MEDICINE

## 2022-04-05 PROCEDURE — 99999 PR PBB SHADOW E&M-EST. PATIENT-LVL V: CPT | Mod: PBBFAC,,, | Performed by: INTERNAL MEDICINE

## 2022-04-05 PROCEDURE — 99999 PR PBB SHADOW E&M-EST. PATIENT-LVL V: ICD-10-PCS | Mod: PBBFAC,,, | Performed by: INTERNAL MEDICINE

## 2022-04-05 PROCEDURE — 99214 OFFICE O/P EST MOD 30 MIN: CPT | Mod: S$GLB,,, | Performed by: INTERNAL MEDICINE

## 2022-04-05 RX ORDER — PEN NEEDLE, DIABETIC 30 GX3/16"
NEEDLE, DISPOSABLE MISCELLANEOUS
Qty: 300 EACH | Refills: 3 | Status: SHIPPED | OUTPATIENT
Start: 2022-04-05

## 2022-04-05 RX ORDER — BETAMETHASONE DIPROPIONATE 0.5 MG/G
CREAM TOPICAL 2 TIMES DAILY
Qty: 45 G | Refills: 0 | Status: SHIPPED | OUTPATIENT
Start: 2022-04-05 | End: 2022-05-07 | Stop reason: SDUPTHER

## 2022-04-05 RX ORDER — INSULIN ASPART 100 [IU]/ML
15 INJECTION, SOLUTION INTRAVENOUS; SUBCUTANEOUS
Qty: 45 ML | Refills: 3 | Status: SHIPPED | OUTPATIENT
Start: 2022-04-05 | End: 2022-04-07

## 2022-04-05 RX ORDER — INSULIN GLARGINE 100 [IU]/ML
50 INJECTION, SOLUTION SUBCUTANEOUS NIGHTLY
Qty: 45 ML | Refills: 1 | Status: SHIPPED | OUTPATIENT
Start: 2022-04-05 | End: 2022-06-30 | Stop reason: ALTCHOICE

## 2022-04-05 NOTE — PROGRESS NOTES
" Patient ID: Samaria Verma is a 46 y.o. female.    Chief Complaint: Blood Sugar Problem      Assessment and Plan      Increase Levemir to 50 units  Increase NovoLog to 15 units 4 times a day  Check CT abdomen in the presence of sudden onset of type II diabetes, diarrhea, and nausea  Endo referral  Betamethasone for rash    1. Diarrhea, unspecified type  CT Abdomen With Contrast   2. Nausea  CT Abdomen With Contrast   3. Type 2 diabetes mellitus with hyperglycemia, with long-term current use of insulin  CT Abdomen With Contrast    pen needle, diabetic 31 gauge x 5/16" Ndle    insulin aspart U-100 (NOVOLOG FLEXPEN U-100 INSULIN) 100 unit/mL (3 mL) InPn pen    insulin (LANTUS SOLOSTAR U-100 INSULIN) glargine 100 units/mL (3mL) SubQ pen    Ambulatory referral/consult to Endocrinology   4. Rash in adult  betamethasone dipropionate 0.05 % cream     HPI     Past medical history of hypertension, type 2 diabetes, nonischemic cardiomyopathy, AICD placement, diabetic neuropathy, depression    Follow-up diabetes. BG still running in the 400s.  She has dexcom CGM. She is up to 43 units of levemir and taking 10 units of novolog.She states she is eating low-carb. She is feeling very fatigued. She is still having diarrhea even though she is off metformin. She woke up with rash of left lower quadrant. This is painful.           Review of Systems   Constitutional: Positive for fatigue.   Cardiovascular: Negative for chest pain.   Endocrine: Positive for polydipsia and polyphagia. Negative for polyuria.   Skin: Negative for pallor.   Neurological: Positive for dizziness, speech difficulty, weakness and headaches. Negative for tremors and seizures.   Psychiatric/Behavioral: Positive for confusion. The patient is nervous/anxious.         Vitals:    04/05/22 1424   BP: 136/80   Pulse: 81     Physical Exam  Cardiovascular:      Rate and Rhythm: Normal rate and regular rhythm.      Heart sounds: No murmur heard.    No gallop.   Pulmonary:    "   Breath sounds: Normal breath sounds. No wheezing or rhonchi.   Abdominal:      General: Bowel sounds are normal.      Palpations: Abdomen is soft.      Tenderness: There is no abdominal tenderness.   Musculoskeletal:         General: Normal range of motion.      Cervical back: Neck supple.   Skin:     General: Skin is warm.      Findings: Rash present.   Neurological:      Mental Status: She is alert.   Psychiatric:         Behavior: Behavior normal.         I personally reviewed past medical, family and social history.  Medication List with Changes/Refills   New Medications    BETAMETHASONE DIPROPIONATE 0.05 % CREAM    Apply topically 2 (two) times daily.   Current Medications    ASPIRIN (ECOTRIN) 81 MG EC TABLET    Take 1 tablet (81 mg total) by mouth once daily.    ATORVASTATIN (LIPITOR) 40 MG TABLET    Take 1 tablet (40 mg total) by mouth once daily.    BLOOD SUGAR DIAGNOSTIC STRP    Use to test blood glucose 4 (four) times daily before meals and nightly.    BLOOD-GLUCOSE METER (ACCU-CHEK GUIDE GLUCOSE METER) MISC    Use to test blood glucose 4 (four) times daily before meals and nightly.    BLOOD-GLUCOSE METER,CONTINUOUS (DEXCOM ) MISC    Use to check blood glucose at least 4 times a day    BLOOD-GLUCOSE SENSOR (DEXCOM G5-G4 SENSOR) LONG    Apply 1 sensor every 10 days    BLOOD-GLUCOSE TRANSMITTER (DEXCOM G5 TRANSMITTER) LONG    Replace every 90 days    CARVEDILOL (COREG) 6.25 MG TABLET    Take 1 tablet (6.25 mg total) by mouth 2 (two) times daily.    FAMOTIDINE (PEPCID) 20 MG TABLET    Take 1 tablet (20 mg total) by mouth 2 (two) times daily. for 5 days    FUROSEMIDE (LASIX) 40 MG TABLET    Take 1 tablet (40 mg total) by mouth 2 (two) times daily.    GABAPENTIN (NEURONTIN) 300 MG CAPSULE    Take 1 capsule (300 mg total) by mouth 2 (two) times daily.    INSULIN ASPART U-100 (NOVOLOG) 100 UNIT/ML (3 ML) INPN PEN    Inject 1-10 Units into the skin before meals and at bedtime as needed (Hyperglycemia).  "   LANCETS (ACCU-CHEK SOFTCLIX LANCETS) MISC    Use to check blood glucose 4 (four) times daily before meals and nightly.    NITROGLYCERIN (NITROSTAT) 0.4 MG SL TABLET    Place 1 tablet (0.4 mg total) under the tongue every 5 (five) minutes as needed for Chest pain.    SACUBITRIL-VALSARTAN (ENTRESTO) 24-26 MG PER TABLET    Take 1 tablet by mouth 2 (two) times daily.    SERTRALINE (ZOLOFT) 50 MG TABLET    sertraline 50 mg tablet    SPIRONOLACTONE (ALDACTONE) 50 MG TABLET    Take 1 tablet (50 mg total) by mouth once daily.   Changed and/or Refilled Medications    Modified Medication Previous Medication    INSULIN (LANTUS SOLOSTAR U-100 INSULIN) GLARGINE 100 UNITS/ML (3ML) SUBQ PEN insulin (LANTUS SOLOSTAR U-100 INSULIN) glargine 100 units/mL (3mL) SubQ pen       Inject 50 Units into the skin every evening.    Inject 24 Units into the skin every evening.    INSULIN ASPART U-100 (NOVOLOG FLEXPEN U-100 INSULIN) 100 UNIT/ML (3 ML) INPN PEN insulin aspart U-100 (NOVOLOG FLEXPEN U-100 INSULIN) 100 unit/mL (3 mL) InPn pen       Inject 15 Units into the skin 4 (four) times daily with meals and nightly.    Inject 6-8 Units into the skin 4 (four) times daily with meals and nightly.    PEN NEEDLE, DIABETIC 31 GAUGE X 5/16" NDLE pen needle, diabetic 31 gauge x 5/16" Ndle       Use to inject insulin 4 times daily    1 each by Misc.(Non-Drug; Combo Route) route 2 (two) times daily with meals.               Answers for HPI/ROS submitted by the patient on 4/5/2022  Diabetes type: type 2  MedicAlert ID: No  Disease duration: 2 years  blurred vision: Yes  foot paresthesias: Yes  foot ulcerations: No  visual change: Yes  weight loss: No  Symptom course: worsening  hunger: Yes  mood changes: Yes  sleepiness: Yes  sweats: No  blackouts: No  hospitalization: No  nocturnal hypoglycemia: No  required assistance: No  required glucagon: No  CVA: No  heart disease: No  nephropathy: No  peripheral neuropathy: Yes  PVD: No  retinopathy: " Yes  autonomic neuropathy: No  CAD risks: family history, hypertension, stress  Current treatments: insulin injections  Treatment compliance: all of the time  Dose schedule: pre-breakfast, pre-lunch, pre-dinner, at bedtime  Given by: patient  Injection sites: abdominal wall, arms  Home blood tests: 5+ x per day  Home urines: <1 x per month  Monitoring compliance: excellent  Blood glucose trend: increasing rapidly  Weight trend: fluctuating dramatically  Current diet: diabetic  Exercise: daily  Dietitian visit: No  Eye exam current: No  Sees podiatrist: No

## 2022-04-06 ENCOUNTER — LAB VISIT (OUTPATIENT)
Dept: LAB | Facility: HOSPITAL | Age: 47
End: 2022-04-06
Attending: INTERNAL MEDICINE
Payer: MEDICARE

## 2022-04-06 DIAGNOSIS — Z12.11 SCREENING FOR COLON CANCER: ICD-10-CM

## 2022-04-06 PROCEDURE — 82274 ASSAY TEST FOR BLOOD FECAL: CPT | Performed by: INTERNAL MEDICINE

## 2022-04-07 DIAGNOSIS — Z79.4 TYPE 2 DIABETES MELLITUS WITH HYPERGLYCEMIA, WITH LONG-TERM CURRENT USE OF INSULIN: ICD-10-CM

## 2022-04-07 DIAGNOSIS — E11.65 TYPE 2 DIABETES MELLITUS WITH HYPERGLYCEMIA, WITH LONG-TERM CURRENT USE OF INSULIN: ICD-10-CM

## 2022-04-07 RX ORDER — INSULIN ASPART 100 [IU]/ML
15 INJECTION, SOLUTION INTRAVENOUS; SUBCUTANEOUS
Qty: 81 ML | Refills: 1 | Status: ON HOLD | OUTPATIENT
Start: 2022-04-07 | End: 2022-08-02 | Stop reason: HOSPADM

## 2022-04-07 NOTE — PROGRESS NOTES
Patient ID: Samaria Verma is a 46 y.o. female.    Chief Complaint: No chief complaint on file.      Assessment and Plan      ***    1. Type 2 diabetes mellitus with hyperglycemia, with long-term current use of insulin  insulin aspart U-100 (NOVOLOG FLEXPEN U-100 INSULIN) 100 unit/mL (3 mL) InPn pen     HPI     ***    Review of Systems     There were no vitals filed for this visit.  Physical Exam    I personally reviewed past medical, family and social history.  Medication List with Changes/Refills   Current Medications    ASPIRIN (ECOTRIN) 81 MG EC TABLET    Take 1 tablet (81 mg total) by mouth once daily.    ATORVASTATIN (LIPITOR) 40 MG TABLET    Take 1 tablet (40 mg total) by mouth once daily.    BETAMETHASONE DIPROPIONATE 0.05 % CREAM    Apply topically 2 (two) times daily.    BLOOD SUGAR DIAGNOSTIC STRP    Use to test blood glucose 4 (four) times daily before meals and nightly.    BLOOD-GLUCOSE METER (ACCU-CHEK GUIDE GLUCOSE METER) MISC    Use to test blood glucose 4 (four) times daily before meals and nightly.    BLOOD-GLUCOSE METER,CONTINUOUS (DEXCOM ) MISC    Use to check blood glucose at least 4 times a day    BLOOD-GLUCOSE SENSOR (DEXCOM G5-G4 SENSOR) LONG    Apply 1 sensor every 10 days    BLOOD-GLUCOSE TRANSMITTER (DEXCOM G5 TRANSMITTER) LONG    Replace every 90 days    CARVEDILOL (COREG) 6.25 MG TABLET    Take 1 tablet (6.25 mg total) by mouth 2 (two) times daily.    FAMOTIDINE (PEPCID) 20 MG TABLET    Take 1 tablet (20 mg total) by mouth 2 (two) times daily. for 5 days    FUROSEMIDE (LASIX) 40 MG TABLET    Take 1 tablet (40 mg total) by mouth 2 (two) times daily.    GABAPENTIN (NEURONTIN) 300 MG CAPSULE    Take 1 capsule (300 mg total) by mouth 2 (two) times daily.    INSULIN (LANTUS SOLOSTAR U-100 INSULIN) GLARGINE 100 UNITS/ML (3ML) SUBQ PEN    Inject 50 Units into the skin every evening.    INSULIN SYRINGE-NEEDLE U-100 (BD INSULIN SYRINGE ULTRA-FINE) 1 ML 31 GAUGE X 5/16 SYRG    Use to  "inject insulin 5 times daily    LANCETS (ACCU-CHEK SOFTCLIX LANCETS) MISC    Use to check blood glucose 4 (four) times daily before meals and nightly.    NITROGLYCERIN (NITROSTAT) 0.4 MG SL TABLET    Place 1 tablet (0.4 mg total) under the tongue every 5 (five) minutes as needed for Chest pain.    PEN NEEDLE, DIABETIC 31 GAUGE X 5/16" NDLE    Use to inject insulin 4 times daily    SACUBITRIL-VALSARTAN (ENTRESTO) 24-26 MG PER TABLET    Take 1 tablet by mouth 2 (two) times daily.    SERTRALINE (ZOLOFT) 50 MG TABLET    sertraline 50 mg tablet    SPIRONOLACTONE (ALDACTONE) 50 MG TABLET    Take 1 tablet (50 mg total) by mouth once daily.   Changed and/or Refilled Medications    Modified Medication Previous Medication    INSULIN ASPART U-100 (NOVOLOG FLEXPEN U-100 INSULIN) 100 UNIT/ML (3 ML) INPN PEN insulin aspart U-100 (NOVOLOG FLEXPEN U-100 INSULIN) 100 unit/mL (3 mL) InPn pen       Inject 15 Units into the skin 4 (four) times daily with meals and nightly. Plus sliding scale, max of 90 units per day    Inject 15 Units into the skin 4 (four) times daily with meals and nightly.   Discontinued Medications    INSULIN ASPART U-100 (NOVOLOG) 100 UNIT/ML (3 ML) INPN PEN    Inject 1-10 Units into the skin before meals and at bedtime as needed (Hyperglycemia).             "

## 2022-04-08 ENCOUNTER — TELEPHONE (OUTPATIENT)
Dept: CARDIOLOGY | Facility: HOSPITAL | Age: 47
End: 2022-04-08
Payer: MEDICARE

## 2022-04-08 LAB — HEMOCCULT STL QL IA: NEGATIVE

## 2022-04-12 ENCOUNTER — PATIENT OUTREACH (OUTPATIENT)
Dept: ADMINISTRATIVE | Facility: HOSPITAL | Age: 47
End: 2022-04-12
Payer: MEDICARE

## 2022-04-29 ENCOUNTER — TELEPHONE (OUTPATIENT)
Dept: ADMINISTRATIVE | Facility: HOSPITAL | Age: 47
End: 2022-04-29
Payer: MEDICARE

## 2022-05-07 DIAGNOSIS — R21 RASH IN ADULT: ICD-10-CM

## 2022-05-08 NOTE — TELEPHONE ENCOUNTER
No new care gaps identified.  [facility] Embedded Care Gaps. Reference number: [id number]. 5/07/2022   11:34:51 PM CDT

## 2022-05-09 ENCOUNTER — PATIENT MESSAGE (OUTPATIENT)
Dept: SMOKING CESSATION | Facility: CLINIC | Age: 47
End: 2022-05-09
Payer: MEDICARE

## 2022-05-09 RX ORDER — BETAMETHASONE DIPROPIONATE 0.5 MG/G
CREAM TOPICAL 2 TIMES DAILY
Qty: 45 G | Refills: 0 | Status: ON HOLD | OUTPATIENT
Start: 2022-05-09 | End: 2022-07-28 | Stop reason: HOSPADM

## 2022-05-09 NOTE — TELEPHONE ENCOUNTER
Refill Routing Note   Medication(s) are not appropriate for processing by [facility] for the following reason(s):      - Medication is a new start (<3 months)    [facility] action(s):  Defer          Medication reconciliation completed: No     Appointments  past 12m or future 3m with PCP    Date Provider   Last Visit   4/5/2022 Dylan Martinez, DO   Next Visit   5/20/2022 Dylan Martinez, DO   ED visits in past 90 days: 1        Note composed:10:48 AM 05/09/2022

## 2022-05-20 ENCOUNTER — PATIENT OUTREACH (OUTPATIENT)
Dept: ADMINISTRATIVE | Facility: OTHER | Age: 47
End: 2022-05-20
Payer: MEDICARE

## 2022-05-20 NOTE — PROGRESS NOTES
Health Maintenance Due   Topic Date Due    Foot Exam  Never done    Eye Exam  Never done    TETANUS VACCINE  Never done    COVID-19 Vaccine (2 - Pfizer series) 08/27/2021     Updates were requested from care everywhere.  Chart was reviewed for overdue Proactive [facility] Encounters ([facility]) topics (CRS, Breast Cancer Screening, Eye exam)  Health Maintenance has been updated.  [facility] immunization registry triggered.  Immunizations were reconciled.

## 2022-06-06 ENCOUNTER — PES CALL (OUTPATIENT)
Dept: ADMINISTRATIVE | Facility: CLINIC | Age: 47
End: 2022-06-06
Payer: MEDICARE

## 2022-06-07 ENCOUNTER — OFFICE VISIT (OUTPATIENT)
Dept: OPHTHALMOLOGY | Facility: CLINIC | Age: 47
End: 2022-06-07
Payer: MEDICARE

## 2022-06-07 DIAGNOSIS — E66.9 DIABETES MELLITUS TYPE 2 IN OBESE: ICD-10-CM

## 2022-06-07 DIAGNOSIS — E11.9 DIABETES MELLITUS TYPE 2 WITHOUT RETINOPATHY: Primary | ICD-10-CM

## 2022-06-07 DIAGNOSIS — E11.69 DIABETES MELLITUS TYPE 2 IN OBESE: ICD-10-CM

## 2022-06-07 DIAGNOSIS — H52.7 REFRACTIVE ERROR: ICD-10-CM

## 2022-06-07 PROCEDURE — 92004 COMPRE OPH EXAM NEW PT 1/>: CPT | Mod: S$GLB,,, | Performed by: OPHTHALMOLOGY

## 2022-06-07 PROCEDURE — 92015 PR REFRACTION: ICD-10-PCS | Mod: S$GLB,,, | Performed by: OPHTHALMOLOGY

## 2022-06-07 PROCEDURE — 92004 PR EYE EXAM, NEW PATIENT,COMPREHESV: ICD-10-PCS | Mod: S$GLB,,, | Performed by: OPHTHALMOLOGY

## 2022-06-07 PROCEDURE — 99999 PR PBB SHADOW E&M-EST. PATIENT-LVL III: CPT | Mod: PBBFAC,,, | Performed by: OPHTHALMOLOGY

## 2022-06-07 PROCEDURE — 99999 PR PBB SHADOW E&M-EST. PATIENT-LVL III: ICD-10-PCS | Mod: PBBFAC,,, | Performed by: OPHTHALMOLOGY

## 2022-06-07 PROCEDURE — 92015 DETERMINE REFRACTIVE STATE: CPT | Mod: S$GLB,,, | Performed by: OPHTHALMOLOGY

## 2022-06-07 NOTE — PROGRESS NOTES
HPI     Dle- 2-3 yrs- Outside Provider    Pt here for routine diabetic eye exam. Pt sts changes to distance and near   va since last seen. Floaters, occasional. Denies flashes/eye pain. No   gtts. DM controlled with meds. HPN controlled with meds.     Hemoglobin A1C       Date                     Value               Ref Range             Status                03/15/2022               >14.0 (H)           4.0 - 5.6 %           Final                       01/06/2016               7.1 (H)             0.0 - 5.6 %           Final                          03/05/2015               6.5 (H)             4.5 - 6.2 %           Final                Last edited by Oxana Jameson MA on 6/7/2022  3:02 PM. (History)        ROS     Positive for: Endocrine    Negative for: Constitutional, Gastrointestinal, Neurological, Skin,   Genitourinary, Musculoskeletal, HENT, Cardiovascular, Eyes, Respiratory,   Psychiatric, Allergic/Imm, Heme/Lymph    Last edited by Amari Peterson Jr., MD on 6/7/2022  3:35 PM. (History)        Assessment /Plan     For exam results, see Encounter Report.    Diabetes mellitus type 2 without retinopathy    Diabetes mellitus type 2 in obese  -     Ambulatory referral/consult to Ophthalmology    Refractive error      -no retinopathy seen on DFE today  -continue good blood pressure and glucose control  -F/U for yearly DFE  Rx for glasses given to patient

## 2022-06-08 ENCOUNTER — OFFICE VISIT (OUTPATIENT)
Dept: FAMILY MEDICINE | Facility: CLINIC | Age: 47
End: 2022-06-08
Payer: MEDICARE

## 2022-06-08 VITALS
BODY MASS INDEX: 31.92 KG/M2 | OXYGEN SATURATION: 97 % | HEIGHT: 65 IN | HEART RATE: 71 BPM | WEIGHT: 191.56 LBS | SYSTOLIC BLOOD PRESSURE: 138 MMHG | DIASTOLIC BLOOD PRESSURE: 80 MMHG

## 2022-06-08 DIAGNOSIS — R10.31 RIGHT LOWER QUADRANT ABDOMINAL PAIN: Primary | ICD-10-CM

## 2022-06-08 DIAGNOSIS — R11.2 NAUSEA AND VOMITING, INTRACTABILITY OF VOMITING NOT SPECIFIED, UNSPECIFIED VOMITING TYPE: ICD-10-CM

## 2022-06-08 PROBLEM — K80.00 CALCULUS OF GALLBLADDER WITH ACUTE CHOLECYSTITIS WITHOUT OBSTRUCTION: Status: ACTIVE | Noted: 2022-06-08

## 2022-06-08 PROCEDURE — 99214 PR OFFICE/OUTPT VISIT, EST, LEVL IV, 30-39 MIN: ICD-10-PCS | Mod: S$GLB,ICN,, | Performed by: INTERNAL MEDICINE

## 2022-06-08 PROCEDURE — 99215 OFFICE O/P EST HI 40 MIN: CPT | Mod: PBBFAC,PO | Performed by: INTERNAL MEDICINE

## 2022-06-08 PROCEDURE — 99214 OFFICE O/P EST MOD 30 MIN: CPT | Mod: S$GLB,ICN,, | Performed by: INTERNAL MEDICINE

## 2022-06-08 PROCEDURE — 99999 PR PBB SHADOW E&M-EST. PATIENT-LVL V: ICD-10-PCS | Mod: PBBFAC,,, | Performed by: INTERNAL MEDICINE

## 2022-06-08 PROCEDURE — 99999 PR PBB SHADOW E&M-EST. PATIENT-LVL V: CPT | Mod: PBBFAC,,, | Performed by: INTERNAL MEDICINE

## 2022-06-08 NOTE — PROGRESS NOTES
Patient ID: Samaria Verma is a 46 y.o. female.    Chief Complaint: lower abdominal pain       Assessment and Plan      Concern for appendicitis versus diverticulitis.  Will go to [facility] in personal vehicle for evaluation.  Close follow-up in clinic after ER.     1. Right lower quadrant abdominal pain     2. Nausea and vomiting, intractability of vomiting not specified, unspecified vomiting type        HPI     hypertension, type 2 diabetes, nonischemic cardiomyopathy, AICD placement, diabetic neuropathy, depression     Review of Systems   Constitutional: Negative for fever.   Gastrointestinal: Positive for abdominal pain, diarrhea, nausea and vomiting. Negative for constipation.   Genitourinary: Negative for dysuria, frequency and hematuria.   Musculoskeletal: Positive for arthralgias and myalgias.   Neurological: Positive for headaches.     Has been having abd pain RLQ for 4 weeks. It radiates to the back. 10/10 pain. Having diarrhea, nausea, and vomiting. Has not been able hold anything down.        Vitals:    06/08/22 1049   BP: 138/80   Pulse: 71     Physical Exam  Cardiovascular:      Rate and Rhythm: Normal rate and regular rhythm.      Heart sounds: No murmur heard.    No gallop.   Pulmonary:      Breath sounds: Normal breath sounds. No wheezing or rhonchi.   Abdominal:      General: Bowel sounds are normal.      Palpations: Abdomen is soft.      Tenderness: There is abdominal tenderness.      Comments: Very tender to palpation in the right lower quadrant   Musculoskeletal:         General: Normal range of motion.      Cervical back: Neck supple.   Skin:     General: Skin is warm.      Findings: No rash.   Neurological:      Mental Status: She is alert.   Psychiatric:         Behavior: Behavior normal.         I personally reviewed past medical, family and social history.  Current Outpatient Medications on File Prior to Visit   Medication Sig Dispense Refill    aspirin (ECOTRIN) 81 MG EC tablet Take 1 tablet (81  "mg total) by mouth once daily. 30 tablet 11    atorvastatin (LIPITOR) 40 MG tablet Take 1 tablet (40 mg total) by mouth once daily. 90 tablet 3    betamethasone dipropionate 0.05 % cream Apply topically 2 (two) times daily. 45 g 0    blood sugar diagnostic Strp Use to test blood glucose 4 (four) times daily before meals and nightly. 200 each 11    blood-glucose meter (ACCU-CHEK GUIDE GLUCOSE METER) Misc Use to test blood glucose 4 (four) times daily before meals and nightly. 1 each 0    blood-glucose meter,continuous (DEXCOM ) Misc Use to check blood glucose at least 4 times a day 1 each 0    blood-glucose sensor (DEXCOM G5-G4 SENSOR) Bhumika Apply 1 sensor every 10 days 9 each 3    blood-glucose transmitter (DEXCOM G5 TRANSMITTER) Bhumika Replace every 90 days 1 each 3    carvediloL (COREG) 6.25 MG tablet Take 1 tablet (6.25 mg total) by mouth 2 (two) times daily. 60 tablet 11    furosemide (LASIX) 40 MG tablet Take 1 tablet (40 mg total) by mouth 2 (two) times daily. 30 tablet 1    gabapentin (NEURONTIN) 300 MG capsule Take 1 capsule (300 mg total) by mouth 2 (two) times daily. 60 capsule 3    insulin (LANTUS SOLOSTAR U-100 INSULIN) glargine 100 units/mL (3mL) SubQ pen Inject 50 Units into the skin every evening. 45 mL 1    insulin aspart U-100 (NOVOLOG FLEXPEN U-100 INSULIN) 100 unit/mL (3 mL) InPn pen Inject 15 Units into the skin 4 (four) times daily with meals and nightly. Plus sliding scale, max of 90 units per day 81 mL 1    insulin syringe-needle U-100 (BD INSULIN SYRINGE ULTRA-FINE) 1 mL 31 gauge x 5/16 Syrg Use to inject insulin 5 times daily 100 each 11    lancets (ACCU-CHEK SOFTCLIX LANCETS) Misc Use to check blood glucose 4 (four) times daily before meals and nightly. 200 each 5    nitroGLYCERIN (NITROSTAT) 0.4 MG SL tablet Place 1 tablet (0.4 mg total) under the tongue every 5 (five) minutes as needed for Chest pain. 25 tablet 0    pen needle, diabetic 31 gauge x 5/16" Ndle Use to " inject insulin 4 times daily 300 each 3    sacubitriL-valsartan (ENTRESTO) 24-26 mg per tablet Take 1 tablet by mouth 2 (two) times daily. 30 tablet 11    sertraline (ZOLOFT) 50 MG tablet sertraline 50 mg tablet      famotidine (PEPCID) 20 MG tablet Take 1 tablet (20 mg total) by mouth 2 (two) times daily. for 5 days 10 tablet 0    spironolactone (ALDACTONE) 50 MG tablet Take 1 tablet (50 mg total) by mouth once daily. 30 tablet 11    [DISCONTINUED] albuterol (PROVENTIL/VENTOLIN HFA) 90 mcg/actuation inhaler Inhale 2 puffs into the lungs every 6 (six) hours as needed. Rescue 18 g 0    [DISCONTINUED] azelastine (ASTELIN) 137 mcg (0.1 %) nasal spray 1 spray (137 mcg total) by Nasal route 2 (two) times daily. 30 mL 0    [DISCONTINUED] clonazePAM (KLONOPIN) 0.5 MG tablet Take 0.5 mg by mouth 2 (two) times daily as needed for Anxiety.       No current facility-administered medications on file prior to visit.              Answers for HPI/ROS submitted by the patient on 6/7/2022  Chronicity: chronic  Onset: 1 to 4 weeks ago  Onset quality: gradual  Frequency: constantly  Episode duration: 7 days  Progression since onset: rapidly worsening  Pain location: RUQ  Pain - numeric: 10/10  Pain quality: burning, sharp  anorexia: Yes  belching: No  flatus: No  hematochezia: No  melena: No  weight loss: No  Aggravated by: being still, certain positions, coughing, deep breathing, eating  Relieved by: nothing  Pain severity: severe  Treatments tried: acetaminophen  Improvement on treatment: no relief  abdominal surgery: No  colon cancer: No  Crohn's disease: No  gallstones: No  GERD: No  irritable bowel syndrome: No  kidney stones: No  pancreatitis: No  PUD: No  ulcerative colitis: No  UTI: Yes

## 2022-06-15 ENCOUNTER — NURSE TRIAGE (OUTPATIENT)
Dept: ADMINISTRATIVE | Facility: CLINIC | Age: 47
End: 2022-06-15
Payer: MEDICARE

## 2022-06-15 NOTE — TELEPHONE ENCOUNTER
"Patient is 1 week post lap aristides. She reports that since surgery her pain has continued to intensify. She currently c/o pain 10/10 that is not relieved by her pain medications. Also reports nausea and "belly tightening". Advised per protocol to discuss with the surgeon's office in 1 hour. Also advised to go to the nearest ED if pain is unbearable. Patient VU. Will route message. Advised the patient to call back with any further questions or if symptoms worsen.      Reason for Disposition   SEVERE post-op pain (e.g., excruciating, pain scale 8-10) that is not controlled with pain medications    Additional Information   Negative: Sounds like a life-threatening emergency to the triager   Negative: Bright red, wide-spread, sunburn-like rash   Negative: SEVERE headache and after spinal (epidural) anesthesia   Negative: Vomiting and persists > 4 hours   Negative: Vomiting and abdomen looks much more swollen than usual   Negative: Drinking very little and dehydration suspected (e.g., no urine > 12 hours, very dry mouth, very lightheaded)   Negative: Patient sounds very sick or weak to the triager   Negative: Sounds like a serious complication to the triager   Negative: Fever > 100.4 F (38.0 C)   Negative: Caller has URGENT question and triager unable to answer question    Protocols used: POST-OP SYMPTOMS AND [id number]    "

## 2022-06-17 ENCOUNTER — OFFICE VISIT (OUTPATIENT)
Dept: FAMILY MEDICINE | Facility: CLINIC | Age: 47
End: 2022-06-17
Payer: MEDICARE

## 2022-06-17 VITALS
OXYGEN SATURATION: 98 % | SYSTOLIC BLOOD PRESSURE: 160 MMHG | DIASTOLIC BLOOD PRESSURE: 80 MMHG | HEIGHT: 66 IN | BODY MASS INDEX: 30.62 KG/M2 | HEART RATE: 92 BPM | WEIGHT: 190.5 LBS

## 2022-06-17 DIAGNOSIS — E11.69 DIABETES MELLITUS TYPE 2 IN OBESE: ICD-10-CM

## 2022-06-17 DIAGNOSIS — I10 PRIMARY HYPERTENSION: ICD-10-CM

## 2022-06-17 DIAGNOSIS — M54.9 MID BACK PAIN ON RIGHT SIDE: Primary | ICD-10-CM

## 2022-06-17 DIAGNOSIS — E66.9 DIABETES MELLITUS TYPE 2 IN OBESE: ICD-10-CM

## 2022-06-17 PROCEDURE — 99999 PR PBB SHADOW E&M-EST. PATIENT-LVL V: CPT | Mod: PBBFAC,,, | Performed by: INTERNAL MEDICINE

## 2022-06-17 PROCEDURE — 99999 PR PBB SHADOW E&M-EST. PATIENT-LVL V: ICD-10-PCS | Mod: PBBFAC,,, | Performed by: INTERNAL MEDICINE

## 2022-06-17 PROCEDURE — 99215 OFFICE O/P EST HI 40 MIN: CPT | Mod: PBBFAC,PO | Performed by: INTERNAL MEDICINE

## 2022-06-17 PROCEDURE — 99214 OFFICE O/P EST MOD 30 MIN: CPT | Mod: S$GLB,ICN,, | Performed by: INTERNAL MEDICINE

## 2022-06-17 PROCEDURE — 99214 PR OFFICE/OUTPT VISIT, EST, LEVL IV, 30-39 MIN: ICD-10-PCS | Mod: S$GLB,ICN,, | Performed by: INTERNAL MEDICINE

## 2022-06-17 RX ORDER — HYDROCODONE BITARTRATE AND ACETAMINOPHEN 7.5; 325 MG/1; MG/1
1 TABLET ORAL EVERY 8 HOURS PRN
Qty: 30 TABLET | Refills: 0 | Status: SHIPPED | OUTPATIENT
Start: 2022-06-17 | End: 2022-07-14 | Stop reason: SDUPTHER

## 2022-06-17 NOTE — PROGRESS NOTES
Patient ID: Samaria Verma is a 46 y.o. female.    Chief Complaint: Back Pain (Right side back pain. Just got gallbladder removed. But still in extreme pain )      Assessment and Plan      Not sure of the cause of pain. It seems intense. Reported 9/10  2 cm Cyst on R. ovary- likely not the cause  Gallbladder out  No kidney stone seen on CT   She has tried taking tylenol without relief.   Can't do MRI 2/2 AICD placement. Check CT lumbar spine and referral to pain management.     Increase lantus to 55 units. Can increase by 2-3 units every 2-3 days, until reaching 65 units or fasting BG between .     Elevated blood pressure likely due to pain, monitor at home, close follow up.     1. Mid back pain on right side  CT Lumbar Spine Without Contrast    HYDROcodone-acetaminophen (NORCO) 7.5-325 mg per tablet    Ambulatory referral/consult to Pain Clinic   2. Diabetes mellitus type 2 in obese     3. Primary hypertension        HPI     hypertension, type 2 diabetes, nonischemic cardiomyopathy, AICD placement, diabetic neuropathy, depression    I sent her to the ER for right-sided pain.  Diagnosed with cholelithiasis and she underwent laparoscopic cholecystectomy. General surgery yesterday for follow-up and was noted to be in severe epigastric pain.  He was concern for bile leak and she went to ER for evaluation.  CT showed diverticulosis without acute diverticulitis and hepatomegaly.  Ultrasound showed surgically absent gallbladder no fluid collection identified.  She was discharged with a prescription of Norco. She is still having right side pain. Radiates to the right flank, very close to the spine.   2 cm Cyst on R. ovary- likely not the cause  Gallbladder out  No kidney stone seen on CT  She has tried taking tylenol without relief.     BG running 200s consistently. Taking 50 units of lantus.          Review of Systems   Constitutional: Negative for fever.   Gastrointestinal: Positive for abdominal pain and nausea.  Negative for constipation, diarrhea and vomiting.   Genitourinary: Negative for dysuria, frequency and hematuria.   Musculoskeletal: Positive for arthralgias and myalgias.   Neurological: Positive for headaches.        Vitals:    06/17/22 1332   BP: (!) 160/80   Pulse:      Physical Exam  Cardiovascular:      Rate and Rhythm: Normal rate and regular rhythm.      Heart sounds: No murmur heard.    No gallop.   Pulmonary:      Breath sounds: Normal breath sounds. No wheezing or rhonchi.   Abdominal:      General: Bowel sounds are normal.      Palpations: Abdomen is soft.      Tenderness: There is no abdominal tenderness.   Musculoskeletal:         General: Normal range of motion.      Cervical back: Neck supple.      Comments: Tenderness to palpation to the right of L5   Skin:     General: Skin is warm.      Findings: No rash.   Neurological:      Mental Status: She is alert.   Psychiatric:         Behavior: Behavior normal.         I personally reviewed past medical, family and social history.  Current Outpatient Medications on File Prior to Visit   Medication Sig Dispense Refill    aspirin (ECOTRIN) 81 MG EC tablet Take 1 tablet (81 mg total) by mouth once daily. 30 tablet 11    atorvastatin (LIPITOR) 40 MG tablet Take 1 tablet (40 mg total) by mouth once daily. 90 tablet 3    betamethasone dipropionate 0.05 % cream Apply topically 2 (two) times daily. 45 g 0    blood sugar diagnostic Strp Use to test blood glucose 4 (four) times daily before meals and nightly. 200 each 11    blood-glucose meter (ACCU-CHEK GUIDE GLUCOSE METER) Misc Use to test blood glucose 4 (four) times daily before meals and nightly. 1 each 0    blood-glucose meter,continuous (DEXCOM ) Misc Use to check blood glucose at least 4 times a day 1 each 0    blood-glucose sensor (DEXCOM G5-G4 SENSOR) Bhumika Apply 1 sensor every 10 days 9 each 3    blood-glucose transmitter (DEXCOM G5 TRANSMITTER) Bhumika Replace every 90 days 1 each 3     "carvediloL (COREG) 6.25 MG tablet Take 1 tablet (6.25 mg total) by mouth 2 (two) times daily. 60 tablet 11    furosemide (LASIX) 40 MG tablet Take 1 tablet (40 mg total) by mouth 2 (two) times daily. (Patient taking differently: Take 40 mg by mouth once daily. Takes once daily in morning) 30 tablet 1    gabapentin (NEURONTIN) 300 MG capsule Take 1 capsule (300 mg total) by mouth 2 (two) times daily. 60 capsule 3    insulin (LANTUS SOLOSTAR U-100 INSULIN) glargine 100 units/mL (3mL) SubQ pen Inject 50 Units into the skin every evening. 45 mL 1    insulin aspart U-100 (NOVOLOG FLEXPEN U-100 INSULIN) 100 unit/mL (3 mL) InPn pen Inject 15 Units into the skin 4 (four) times daily with meals and nightly. Plus sliding scale, max of 90 units per day 81 mL 1    insulin syringe-needle U-100 (BD INSULIN SYRINGE ULTRA-FINE) 1 mL 31 gauge x 5/16 Syrg Use to inject insulin 5 times daily 100 each 11    lancets (ACCU-CHEK SOFTCLIX LANCETS) Misc Use to check blood glucose 4 (four) times daily before meals and nightly. 200 each 5    nitroGLYCERIN (NITROSTAT) 0.4 MG SL tablet Place 1 tablet (0.4 mg total) under the tongue every 5 (five) minutes as needed for Chest pain. 25 tablet 0    ondansetron (ZOFRAN) 4 MG tablet Take 1 tablet (4 mg total) by mouth every 6 (six) hours as needed for Nausea. 20 tablet 0    pen needle, diabetic 31 gauge x 5/16" Ndle Use to inject insulin 4 times daily 300 each 3    sacubitriL-valsartan (ENTRESTO) 24-26 mg per tablet Take 1 tablet by mouth 2 (two) times daily. 30 tablet 11    [DISCONTINUED] HYDROcodone-acetaminophen (NORCO) 7.5-325 mg per tablet Take 1 tablet by mouth every 6 (six) hours as needed for Pain. 12 tablet 0    famotidine (PEPCID) 20 MG tablet Take 1 tablet (20 mg total) by mouth 2 (two) times daily. for 5 days 10 tablet 0    [DISCONTINUED] albuterol (PROVENTIL/VENTOLIN HFA) 90 mcg/actuation inhaler Inhale 2 puffs into the lungs every 6 (six) hours as needed. Rescue 18 g 0    " [DISCONTINUED] azelastine (ASTELIN) 137 mcg (0.1 %) nasal spray 1 spray (137 mcg total) by Nasal route 2 (two) times daily. 30 mL 0    [DISCONTINUED] clonazePAM (KLONOPIN) 0.5 MG tablet Take 0.5 mg by mouth 2 (two) times daily as needed for Anxiety.      [DISCONTINUED] HYDROcodone-acetaminophen (NORCO) 7.5-325 mg per tablet Take 1 tablet by mouth every 6 (six) hours as needed for Pain (pain). 20 tablet 0     Current Facility-Administered Medications on File Prior to Visit   Medication Dose Route Frequency Provider Last Rate Last Admin    [COMPLETED] iodixanoL (VISIPAQUE 320) injection 60 mL  60 mL Intravenous ONCE PRN Freddy Noland MD   60 mL at 06/16/22 1431    [COMPLETED] morphine injection 4 mg  4 mg Intravenous ED 1 Time Petey Stephen NP   4 mg at 06/16/22 1412    [COMPLETED] morphine injection 4 mg  4 mg Intravenous ED 1 Time Petey Stephen NP   4 mg at 06/16/22 1521    [COMPLETED] ondansetron injection 4 mg  4 mg Intravenous ED 1 Time Petey Stephen NP   4 mg at 06/16/22 1413              Answers for HPI/ROS submitted by the patient on 6/17/2022  Chronicity: recurrent  Onset: 1 to 4 weeks ago  Onset quality: gradual  Frequency: constantly  Episode duration: 20 hours  Progression since onset: rapidly worsening  Pain location: generalized abdominal region  Pain - numeric: 10/10  Pain quality: burning, sharp, tearing  anorexia: Yes  belching: Yes  flatus: No  hematochezia: No  melena: No  weight loss: No  Aggravated by: being still, certain positions, deep breathing  Relieved by: nothing  Diagnostic workup: CT scan, surgery, ultrasound  Pain severity: severe  Treatments tried: acetaminophen  Improvement on treatment: mild  abdominal surgery: Yes  colon cancer: No  Crohn's disease: No  gallstones: Yes  GERD: No  irritable bowel syndrome: No  kidney stones: No  pancreatitis: No  PUD: No  ulcerative colitis: No  UTI: No

## 2022-06-22 ENCOUNTER — HOSPITAL ENCOUNTER (OUTPATIENT)
Dept: RADIOLOGY | Facility: HOSPITAL | Age: 47
Discharge: HOME | End: 2022-06-22
Attending: INTERNAL MEDICINE
Payer: MEDICARE

## 2022-06-22 DIAGNOSIS — M54.9 MID BACK PAIN ON RIGHT SIDE: ICD-10-CM

## 2022-06-22 PROCEDURE — 72131 CT LUMBAR SPINE W/O DYE: CPT | Mod: 26,,, | Performed by: RADIOLOGY

## 2022-06-22 PROCEDURE — 72131 CT LUMBAR SPINE WITHOUT CONTRAST: ICD-10-PCS | Mod: 26,,, | Performed by: RADIOLOGY

## 2022-06-22 PROCEDURE — 72131 CT LUMBAR SPINE W/O DYE: CPT | Mod: TC,PO

## 2022-06-23 ENCOUNTER — PATIENT MESSAGE (OUTPATIENT)
Dept: FAMILY MEDICINE | Facility: CLINIC | Age: 47
End: 2022-06-23
Payer: MEDICARE

## 2022-06-27 ENCOUNTER — LAB VISIT (OUTPATIENT)
Dept: LAB | Facility: HOSPITAL | Age: 47
End: 2022-06-27
Attending: PHYSICIAN ASSISTANT
Payer: MEDICARE

## 2022-06-27 ENCOUNTER — TELEPHONE (OUTPATIENT)
Dept: PAIN MEDICINE | Facility: CLINIC | Age: 47
End: 2022-06-27
Payer: MEDICARE

## 2022-06-27 ENCOUNTER — OFFICE VISIT (OUTPATIENT)
Dept: PAIN MEDICINE | Facility: CLINIC | Age: 47
End: 2022-06-27
Payer: MEDICARE

## 2022-06-27 VITALS
WEIGHT: 188.94 LBS | BODY MASS INDEX: 30.36 KG/M2 | HEIGHT: 66 IN | SYSTOLIC BLOOD PRESSURE: 201 MMHG | HEART RATE: 81 BPM | DIASTOLIC BLOOD PRESSURE: 81 MMHG

## 2022-06-27 DIAGNOSIS — M51.36 DDD (DEGENERATIVE DISC DISEASE), LUMBAR: Primary | ICD-10-CM

## 2022-06-27 DIAGNOSIS — Z79.4 TYPE 2 DIABETES MELLITUS WITH DIABETIC POLYNEUROPATHY, WITH LONG-TERM CURRENT USE OF INSULIN: ICD-10-CM

## 2022-06-27 DIAGNOSIS — E11.42 TYPE 2 DIABETES MELLITUS WITH DIABETIC POLYNEUROPATHY, WITH LONG-TERM CURRENT USE OF INSULIN: ICD-10-CM

## 2022-06-27 DIAGNOSIS — M54.9 MID BACK PAIN ON RIGHT SIDE: ICD-10-CM

## 2022-06-27 DIAGNOSIS — M54.16 LUMBAR RADICULOPATHY: ICD-10-CM

## 2022-06-27 LAB
ESTIMATED AVG GLUCOSE: 226 MG/DL (ref 68–131)
HBA1C MFR BLD: 9.5 % (ref 4–5.6)

## 2022-06-27 PROCEDURE — 99999 PR PBB SHADOW E&M-EST. PATIENT-LVL V: ICD-10-PCS | Mod: PBBFAC,,, | Performed by: PHYSICIAN ASSISTANT

## 2022-06-27 PROCEDURE — 99215 OFFICE O/P EST HI 40 MIN: CPT | Mod: PBBFAC,PN | Performed by: PHYSICIAN ASSISTANT

## 2022-06-27 PROCEDURE — 99204 PR OFFICE/OUTPT VISIT, NEW, LEVL IV, 45-59 MIN: ICD-10-PCS | Mod: S$GLB,ICN,, | Performed by: PHYSICIAN ASSISTANT

## 2022-06-27 PROCEDURE — 36415 COLL VENOUS BLD VENIPUNCTURE: CPT | Mod: PO | Performed by: PHYSICIAN ASSISTANT

## 2022-06-27 PROCEDURE — 99204 OFFICE O/P NEW MOD 45 MIN: CPT | Mod: S$GLB,ICN,, | Performed by: PHYSICIAN ASSISTANT

## 2022-06-27 PROCEDURE — 99999 PR PBB SHADOW E&M-EST. PATIENT-LVL V: CPT | Mod: PBBFAC,,, | Performed by: PHYSICIAN ASSISTANT

## 2022-06-27 PROCEDURE — 83036 HEMOGLOBIN GLYCOSYLATED A1C: CPT | Performed by: PHYSICIAN ASSISTANT

## 2022-06-27 RX ORDER — GABAPENTIN 300 MG/1
600 CAPSULE ORAL 2 TIMES DAILY
Qty: 120 CAPSULE | Refills: 3 | Status: SHIPPED | OUTPATIENT
Start: 2022-06-27 | End: 2022-12-28 | Stop reason: SDUPTHER

## 2022-06-27 RX ORDER — METHOCARBAMOL 500 MG/1
500 TABLET, FILM COATED ORAL 3 TIMES DAILY PRN
Qty: 90 TABLET | Refills: 0 | Status: SHIPPED | OUTPATIENT
Start: 2022-06-27 | End: 2022-07-18 | Stop reason: SDUPTHER

## 2022-06-27 NOTE — TELEPHONE ENCOUNTER
Please let the patient know we received her hemoglobin A1c results and although it is still high at 9.5, this has significantly improved from 3 months ago when it was greater than 14. Please advise her to continue to work diligently to reduce this further so we can consider an epidural steroid injection in the future.

## 2022-06-29 NOTE — PROGRESS NOTES
This note was completed with dictation software and grammatical errors may exist.    Chief Complaint   Patient presents with    Mid-back Pain    Leg Pain     Right sided        HPI: Samaria Verma is a 46 y.o. year old female patient who has a past medical history of Anticoagulant long-term use, CHF (congestive heart failure), Chronic systolic congestive heart failure, Diabetes mellitus, Diabetes mellitus type 2 in obese, Hypertension, and Primary hypertension. She presents in referral from Dr. Dylan Martinez for right sided back pain.  The patient reports that she developed severe right mid and low back pain about 1 month ago.  She was evaluated core for cholecystitis and underwent a cholecystectomy on 06/08/22 without relief of her pain.  Over the past several weeks she has also developed intermittent right leg pain.  The pain is located in the entire right lumbar region and lower thoracic region.  This radiates to her right flank and when the pain becomes more intense it will radiate down her right buttock and posterior thigh and right calf.  She describes the pain as burning, throbbing, grabbing and shooting.  It is worse with sitting, standing, lying, bending and eating.  She has been taking gabapentin 300 mg twice a day and hydrocodone-acetaminophen 7.5/325 mg several times a day without significant relief.  She reports having numbness in her feet due to diabetic neuropathy as well as intermittent right posterior thigh and calf numbness.  She reports having weakness in her right leg when her pain is severe.  She denies bladder or bowel incontinence.    Pain intervention history: none    Spine surgeries: none    Antineuropathics: gabapentin 600 mg BID  NSAIDs:  Physical therapy:  Antidepressants:  Muscle relaxers:  Opioids:  Antiplatelets/Anticoagulants: ASA 81 mg    ROS:  The patient reports fatigability, headaches, hypertension, appetite change, back pain, difficulty sleeping, anxiety, depression and loss  "of balance.  Balance of review of systems is negative.    Lab Results   Component Value Date    HGBA1C 9.5 (H) 06/27/2022       Lab Results   Component Value Date    WBC 13.32 (H) 06/16/2022    HGB 13.5 06/16/2022    HCT 41.7 06/16/2022    MCV 83 06/16/2022     06/16/2022             Past Medical History:   Diagnosis Date    Anticoagulant long-term use     CHF (congestive heart failure)     Chronic systolic congestive heart failure 3/7/2017    Diabetes mellitus     Diabetes mellitus type 2 in obese 1/6/2016    Hypertension     Primary hypertension 1/6/2016       Past Surgical History:   Procedure Laterality Date    BREAST SURGERY Bilateral     reduction    CARDIAC DEFIBRILLATOR PLACEMENT  2018    INSERTION OF PACEMAKER  2018    LAPAROSCOPIC CHOLECYSTECTOMY N/A 6/8/2022    Procedure: CHOLECYSTECTOMY, LAPAROSCOPIC;  Surgeon: Sonido Kennedy MD;  Location: [facility];  Service: General;  Laterality: N/A;    TUBAL LIGATION         Social History     Socioeconomic History    Marital status:    Tobacco Use    Smoking status: Never Smoker    Smokeless tobacco: Never Used   Substance and Sexual Activity    Alcohol use: No    Drug use: No    Sexual activity: Yes     Partners: Male         Medications/Allergies: See med card    Vitals:    06/27/22 0849   BP: (!) 201/81   Pulse: 81   Weight: 85.7 kg (188 lb 15 oz)   Height: 5' 6" (1.676 m)   PainSc:   9   PainLoc: Back     Body mass index is 30.49 kg/m².    Physical exam:  Gen: A and O x3, pleasant, well-groomed  Skin: No rashes or obvious lesions  HEENT: PERRLA, no obvious deformities on ears or in canals. Trachea midline.  CVS: Regular rate and rhythm, normal palpable pulses.  Resp:No increased work of breathing, symmetrical chest rise.  Abdomen: Soft, NT/ND.  Musculoskeletal: Able to heel walk, toe walk. No antalgic gait.     Neuro:    Iliopsoas Quadriceps Knee  Flexion Tibialis  anterior Gastro- cnemius EHL   Lower: R 4/5 5/5 4/5 4/5 4/5 4/5    " L 5/5 5/5 5/5 5/5 5/5 5/5     Right lower extremity strength testing is pain limited.     Left  Right    Patellar DTR 2+ 2+   Achilles DTR 2+ 2+   Cali Absent  Absent   Clonus Absent Absent   Babinski Absent Absent     Intact and symmetrical to light touch and pinprick in L1-S1 dermatomes bilaterally.    Lumbar spine:  Lumbar spine: ROM is severely limited with flexion and extension with increased right low back pain during each maneuver.  Pain is especially worse with right oblique extension causing pain radiating into the posterior thigh.  Damian's test causes back pain only.  Supine straight leg raise causes right low back and right leg pain.  Internal and external rotation of the hip causes no increased pain on either side.  Myofascial exam:  Exquisite tenderness to palpation to the right lower thoracic paraspinous muscles and entire right lumbar paraspinous region.    Imagin22 CT Lumbar Spine  Vertebral column: The lumbar vertebral bodies maintain normal height.  There is no fracture.  Alignment is normal.  There is mild disc space narrowing at the L5-S1 level.     Spinal canal, conus, epidural space: The spinal canal appears to be developmentally normal and widely patent.  There is no abnormal epidural mass or fluid collection.  The conus terminates approximately at the level of T12.     Findings by level:     The T11-12, T12-L1, L1-2 levels are essentially normal without spinal canal or foraminal stenosis.     L2-3: There is only very minimal bulging of the annulus.  There is no spinal canal or significant foraminal stenosis.     L3-4: There is minimal bulging of the annulus.  There is no spinal canal or significant foraminal stenosis.     L4-5: There is very mild facet joint arthropathy with a mild diffuse disc bulge.  There is only borderline spinal stenosis.  The foramina are patent.     L5-S1: There is a broad central disc protrusion with calcification which approaches and may just contact  the left S1 root.  There is, however, no spinal stenosis.  There is mild bilateral foraminal stenosis.     Soft tissues, other: The prevertebral soft tissues are normal.  The aorta is normal in caliber.  There is mild atherosclerosis.  There is no hydronephrosis.    Assessment: Samaria Verma is a 46 y.o. year old female patient who has a past medical history of Anticoagulant long-term use, CHF (congestive heart failure), Chronic systolic congestive heart failure, Diabetes mellitus, Diabetes mellitus type 2 in obese, Hypertension, and Primary hypertension. She presents in referral from Dr. Dylan Martinez for right sided back pain.     1. DDD (degenerative disc disease), lumbar     2. Lumbar radiculopathy  gabapentin (NEURONTIN) 300 MG capsule   3. Mid back pain on right side  Ambulatory referral/consult to Pain Clinic   4. Type 2 diabetes mellitus with diabetic polyneuropathy, with long-term current use of insulin  HEMOGLOBIN A1C       Plan:    1. I reviewed the patient's lumbar spine CT with her and we discussed that she does have mild degenerative changes but this can potentially explain her low back and right leg pain.  She has mild spinal stenosis at L4/5 and foraminal stenosis at L5/S1.  Ideally, we would start physical therapy now but she had a cholecystectomy 3 weeks ago and cannot start physical therapy for a total of 6 weeks postoperatively.  When she returns we will send her to [facility] in [place].    2. We also discussed her hemoglobin A1c and this was recently greater than 14. I will check this again and we discussed that hopefully in the future we may be able to provide an epidural steroid injection but currently this would not be safe for her.  3. She cannot have an MRI due to defibrillator and pacemaker.  4. I am going to have her increase gabapentin to 600 mg twice daily if tolerated and I provided a prescription for methocarbamol 500 mg 3 times daily as needed.  5. Follow-up in 3 weeks or sooner as  needed.      Follow Up: RTC in 3  weeks  : Not applicable    Thank you for referring this interesting patient, and I look forward to continuing to collaborate in her care.

## 2022-06-30 ENCOUNTER — OFFICE VISIT (OUTPATIENT)
Dept: ENDOCRINOLOGY | Facility: CLINIC | Age: 47
End: 2022-06-30
Payer: MEDICARE

## 2022-06-30 VITALS
HEART RATE: 84 BPM | BODY MASS INDEX: 31.02 KG/M2 | SYSTOLIC BLOOD PRESSURE: 168 MMHG | DIASTOLIC BLOOD PRESSURE: 90 MMHG | WEIGHT: 193 LBS | HEIGHT: 66 IN

## 2022-06-30 DIAGNOSIS — Z86.79 HISTORY OF CARDIOMYOPATHY: ICD-10-CM

## 2022-06-30 DIAGNOSIS — E11.8 TYPE 2 DIABETES MELLITUS WITH COMPLICATION, WITH LONG-TERM CURRENT USE OF INSULIN: Primary | ICD-10-CM

## 2022-06-30 DIAGNOSIS — I42.8 NICM (NONISCHEMIC CARDIOMYOPATHY): ICD-10-CM

## 2022-06-30 DIAGNOSIS — Z79.4 TYPE 2 DIABETES MELLITUS WITH COMPLICATION, WITH LONG-TERM CURRENT USE OF INSULIN: Primary | ICD-10-CM

## 2022-06-30 DIAGNOSIS — E78.2 MIXED HYPERLIPIDEMIA: ICD-10-CM

## 2022-06-30 DIAGNOSIS — I10 PRIMARY HYPERTENSION: ICD-10-CM

## 2022-06-30 DIAGNOSIS — E11.00 HYPEROSMOLAR HYPERGLYCEMIC STATE (HHS): ICD-10-CM

## 2022-06-30 PROCEDURE — 99215 OFFICE O/P EST HI 40 MIN: CPT | Mod: PBBFAC,PO | Performed by: NURSE PRACTITIONER

## 2022-06-30 PROCEDURE — 99213 OFFICE O/P EST LOW 20 MIN: CPT | Mod: S$GLB,ICN,, | Performed by: NURSE PRACTITIONER

## 2022-06-30 PROCEDURE — 99999 PR PBB SHADOW E&M-EST. PATIENT-LVL V: ICD-10-PCS | Mod: PBBFAC,,, | Performed by: NURSE PRACTITIONER

## 2022-06-30 PROCEDURE — 99999 PR PBB SHADOW E&M-EST. PATIENT-LVL V: CPT | Mod: PBBFAC,,, | Performed by: NURSE PRACTITIONER

## 2022-06-30 PROCEDURE — 99213 PR OFFICE/OUTPT VISIT, EST, LEVL III, 20-29 MIN: ICD-10-PCS | Mod: S$GLB,ICN,, | Performed by: NURSE PRACTITIONER

## 2022-06-30 RX ORDER — SACUBITRIL AND VALSARTAN 24; 26 MG/1; MG/1
1 TABLET, FILM COATED ORAL 2 TIMES DAILY
Qty: 180 TABLET | Refills: 11 | Status: CANCELLED | OUTPATIENT
Start: 2022-06-30

## 2022-06-30 RX ORDER — INSULIN DEGLUDEC 200 U/ML
100 INJECTION, SOLUTION SUBCUTANEOUS NIGHTLY
Qty: 5 PEN | Refills: 6 | Status: ON HOLD | OUTPATIENT
Start: 2022-06-30 | End: 2022-08-02 | Stop reason: SDUPTHER

## 2022-06-30 RX ORDER — INSULIN ASPART 100 [IU]/ML
INJECTION, SOLUTION INTRAVENOUS; SUBCUTANEOUS
Qty: 30 ML | Refills: 6 | Status: SHIPPED | OUTPATIENT
Start: 2022-06-30 | End: 2023-03-08 | Stop reason: SDUPTHER

## 2022-06-30 NOTE — PATIENT INSTRUCTIONS
Tresiba   (or lantus or Touejo)  80 units at bedtime      Novolog 15 units  IF EATING---- may add SS to meal dose, if eating.  Otherwise only take SS dose      Novolog/or Humalog/or Apridra  USE ONLY    Dose is based on level of glucose before meals only (meaning no food or drink for 4 hours). This dose may be added to a standard meal dose if ordered    150-200=+3  201-250=+6  251-300=+9  301-350=+12  351-400=+15

## 2022-06-30 NOTE — PROGRESS NOTES
"Subjective:       Patient ID: Samaria Verma is a 46 y.o. female.    Chief Complaint: Diabetes    HPI Pt is a 46 y.o. wf  with a diagnosis of Type 2 diabetes mellitus diagnosed approximately  March 2022 (feeling poorly, went to ER glucose >800), as well as chronic conditions pending review including NICM, chronic systolic HF, HTN, HLP.  Other pertinent medical and social information noted includes, but not limited to: Pt admitted to hospital in June with abdominal pain--cholecystectomy--with return for abdominal pain and concerns of bile leak--w/u negative.   Abdominal pain continued, and pt started c/o r leg numbness, weakness and further w/u revealed degenerative changes in lumbar spine along with some mild bulging discs.  However, A1C >14% negated steroid injections.     Pt is currently taking 63 of lantus qsh, Novolog 15 units  Plus ss 4 times a day with no regard to if she is eating meals.    Sensor downloaded.  No hypoglycemia--indicating marked insulin resistance.     Sensor Interpretation   Dates of review: 6/17--6/302022    Estimated A1C;nma    Percent of sensor utilization during review period:79%     37% Very high  46% High  16% Time in range  0% Low  0 % Very Low    Time in range parameters:   mg/dL     Prominent Theme/Finding:  Global hyperglycemia with a few glucose dips correlating to taking about 20 units of rapid acting insulin and no food.     Review of Systems   Constitutional: Positive for fatigue. Negative for activity change.        Blood pressure (!) 168/90, pulse 84, height 5' 6" (1.676 m), weight 87.5 kg (193 lb 0.2 oz).   HENT: Negative for hearing loss and trouble swallowing.    Eyes: Positive for visual disturbance. Negative for photophobia.        Last Eye Exam:  May 2022    Respiratory: Positive for shortness of breath. Negative for cough.    Cardiovascular: Positive for chest pain. Negative for palpitations.   Gastrointestinal: Negative for constipation and diarrhea. "   Genitourinary: Negative for frequency and urgency.   Musculoskeletal: Positive for back pain. Negative for arthralgias and myalgias.   Integumentary:  Negative for rash and wound.   Neurological: Positive for numbness. Negative for weakness.   Psychiatric/Behavioral: Negative for sleep disturbance. The patient is not nervous/anxious.          Objective:      Physical Exam  Vitals reviewed.   Constitutional:       Appearance: Normal appearance. She is obese. She is ill-appearing.   HENT:      Head: Normocephalic and atraumatic.      Nose: Nose normal.      Mouth/Throat:      Mouth: Mucous membranes are moist.      Pharynx: Oropharynx is clear.   Eyes:      Extraocular Movements: Extraocular movements intact.      Conjunctiva/sclera: Conjunctivae normal.      Pupils: Pupils are equal, round, and reactive to light.   Cardiovascular:      Rate and Rhythm: Normal rate and regular rhythm.      Pulses: Normal pulses.      Heart sounds: Normal heart sounds.   Pulmonary:      Effort: Pulmonary effort is normal.      Breath sounds: Normal breath sounds.   Musculoskeletal:         General: Normal range of motion.      Cervical back: Normal range of motion and neck supple.      Right lower leg: No edema.      Left lower leg: No edema.      Comments: Feet: no open  Wounds or calluses.   Pedal pulses +2 bilaterally  Sensation via monofilament intact 5/5 bilaterally   Neurological:      General: No focal deficit present.      Mental Status: She is alert and oriented to person, place, and time.   Psychiatric:         Mood and Affect: Mood normal.         Behavior: Behavior normal.         Thought Content: Thought content normal.         Judgment: Judgment normal.         Assessment:         1. Type 2 diabetes mellitus with complication, with long-term current use of insulin  insulin degludec (TRESIBA FLEXTOUCH U-200) 200 unit/mL (3 mL) insulin pen    insulin aspart U-100 (NOVOLOG FLEXPEN U-100 INSULIN) 100 unit/mL (3 mL) InPn pen     Ambulatory referral/consult to Diabetes Education   2. Hyperosmolar hyperglycemic state (HHS)  Ambulatory referral/consult to Endocrinology   3. NICM (nonischemic cardiomyopathy)     4. Primary hypertension     5. Mixed hyperlipidemia     6. History of cardiomyopathy         Plan:       Tresiba   (or lantus or Touejo)  80 units at bedtime      Novolog 15 units  IF EATING---- may add SS to meal dose, if eating.  Otherwise only take SS dose      Novolog/or Humalog/or Apridra  USE ONLY    Dose is based on level of glucose before meals only (meaning no food or drink for 4 hours). This dose may be added to a standard meal dose if ordered    150-200=+3  201-250=+6  251-300=+9  301-350=+12  351-400=+15    ORDERS 06/30/2022     Cons  DE---Pt was  Not trained properly on insulin--please review safety issues  (storage, hypo, etc)  --Also pt trained herself on Dexcom--so probably needs some reinforcement on that.    --Also make sure she is sharing with us, and let me see report.     F/u me in 4 weeks.    60 minutes including action of insulin, meal dose vs correction dose, etc.

## 2022-07-01 ENCOUNTER — TELEPHONE (OUTPATIENT)
Dept: PHARMACY | Facility: CLINIC | Age: 47
End: 2022-07-01
Payer: MEDICARE

## 2022-07-05 ENCOUNTER — PATIENT MESSAGE (OUTPATIENT)
Dept: PAIN MEDICINE | Facility: CLINIC | Age: 47
End: 2022-07-05
Payer: MEDICARE

## 2022-07-05 NOTE — TELEPHONE ENCOUNTER
Add afternoon dose of gabapentin 300 mg.  Verify no fever, chills.  If any signs of infection, follow up with surgeon.

## 2022-07-11 ENCOUNTER — TELEPHONE (OUTPATIENT)
Dept: FAMILY MEDICINE | Facility: CLINIC | Age: 47
End: 2022-07-11
Payer: MEDICARE

## 2022-07-11 NOTE — TELEPHONE ENCOUNTER
Called and left a message informing patient that provider will not be in today. Left a message for patient to contact the office to reschedule appointment

## 2022-07-13 ENCOUNTER — TELEPHONE (OUTPATIENT)
Dept: DIABETES | Facility: CLINIC | Age: 47
End: 2022-07-13
Payer: MEDICARE

## 2022-07-14 ENCOUNTER — OFFICE VISIT (OUTPATIENT)
Dept: PAIN MEDICINE | Facility: CLINIC | Age: 47
End: 2022-07-14
Payer: MEDICARE

## 2022-07-14 VITALS
HEART RATE: 85 BPM | DIASTOLIC BLOOD PRESSURE: 81 MMHG | HEIGHT: 66 IN | WEIGHT: 188.06 LBS | SYSTOLIC BLOOD PRESSURE: 183 MMHG | BODY MASS INDEX: 30.22 KG/M2 | OXYGEN SATURATION: 97 %

## 2022-07-14 DIAGNOSIS — Z79.4 TYPE 2 DIABETES MELLITUS WITH DIABETIC POLYNEUROPATHY, WITH LONG-TERM CURRENT USE OF INSULIN: ICD-10-CM

## 2022-07-14 DIAGNOSIS — M54.16 LUMBAR RADICULOPATHY: ICD-10-CM

## 2022-07-14 DIAGNOSIS — R29.898 RIGHT LEG WEAKNESS: ICD-10-CM

## 2022-07-14 DIAGNOSIS — M54.14 THORACIC RADICULOPATHY: Primary | ICD-10-CM

## 2022-07-14 DIAGNOSIS — E11.42 TYPE 2 DIABETES MELLITUS WITH DIABETIC POLYNEUROPATHY, WITH LONG-TERM CURRENT USE OF INSULIN: ICD-10-CM

## 2022-07-14 PROCEDURE — 99214 PR OFFICE/OUTPT VISIT, EST, LEVL IV, 30-39 MIN: ICD-10-PCS | Mod: S$GLB,,, | Performed by: ANESTHESIOLOGY

## 2022-07-14 PROCEDURE — 99999 PR PBB SHADOW E&M-EST. PATIENT-LVL IV: ICD-10-PCS | Mod: PBBFAC,,, | Performed by: ANESTHESIOLOGY

## 2022-07-14 PROCEDURE — 99999 PR PBB SHADOW E&M-EST. PATIENT-LVL IV: CPT | Mod: PBBFAC,,, | Performed by: ANESTHESIOLOGY

## 2022-07-14 PROCEDURE — 99214 OFFICE O/P EST MOD 30 MIN: CPT | Mod: S$GLB,,, | Performed by: ANESTHESIOLOGY

## 2022-07-14 RX ORDER — HYDROCODONE BITARTRATE AND ACETAMINOPHEN 7.5; 325 MG/1; MG/1
1 TABLET ORAL EVERY 6 HOURS PRN
Qty: 30 TABLET | Refills: 0 | Status: ON HOLD | OUTPATIENT
Start: 2022-07-14 | End: 2022-07-28 | Stop reason: HOSPADM

## 2022-07-14 NOTE — PROGRESS NOTES
This note was completed with dictation software and grammatical errors may exist.    Chief Complaint   Patient presents with    Follow-up        HPI: Samaria Verma is a 46 y.o. year old female patient who has a past medical history of Anticoagulant long-term use, CHF (congestive heart failure), Chronic systolic congestive heart failure, Diabetes mellitus, Diabetes mellitus type 2 in obese, Hypertension, and Primary hypertension. She presents in referral from No ref. provider found for right sided back pain.  Patient returns in follow-up today for back pain and right leg pain.  She had 1st seen my colleague YUMI Mendez and was given gabapentin for back pain and right leg pain.  She denies any relief with this, had been taking hydrocodone with slight benefit.  However the pain is worsening and she is getting what seems to be some weakness in her right leg.  Pain is located starting in the midback on the right side and extends into the low back and right buttock, right hip.  At times it is present in the right thigh but then she also has complete numbness of the right lower leg and right foot with numbness and tingling but also feeling of weakness in her foot.  She denies any bowel or bladder incontinence.  She denies any pain radiating into her chest.  She denies any related abdominal pain. She denies any fevers or chills    Previous history:  The patient reports that she developed severe right mid and low back pain about 1 month ago.  She was evaluated for cholecystitis and underwent a cholecystectomy on 06/08/22 without relief of her pain.  Over the past several weeks she has also developed intermittent right leg pain.  The pain is located in the entire right lumbar region and lower thoracic region.  This radiates to her right flank and when the pain becomes more intense it will radiate down her right buttock and posterior thigh and right calf.  She describes the pain as burning, throbbing, grabbing and  shooting.  It is worse with sitting, standing, lying, bending and eating.  She has been taking gabapentin 300 mg twice a day and hydrocodone-acetaminophen 7.5/325 mg several times a day without significant relief.  She reports having numbness in her feet due to diabetic neuropathy as well as intermittent right posterior thigh and calf numbness.  She reports having weakness in her right leg when her pain is severe.  She denies bladder or bowel incontinence.    Pain intervention history: none    Spine surgeries: none    Antineuropathics: gabapentin 600 mg BID  NSAIDs:  Physical therapy:  Antidepressants:  Muscle relaxers:  Opioids:  Antiplatelets/Anticoagulants: ASA 81 mg    ROS:  The patient reports fatigability, headaches, hypertension, appetite change, back pain, difficulty sleeping, anxiety, depression and loss of balance.  Balance of review of systems is negative.    Lab Results   Component Value Date    HGBA1C 9.5 (H) 06/27/2022       Lab Results   Component Value Date    WBC 13.32 (H) 06/16/2022    HGB 13.5 06/16/2022    HCT 41.7 06/16/2022    MCV 83 06/16/2022     06/16/2022             Past Medical History:   Diagnosis Date    Anticoagulant long-term use     CHF (congestive heart failure)     Chronic systolic congestive heart failure 3/7/2017    Diabetes mellitus     Diabetes mellitus type 2 in obese 1/6/2016    Hypertension     Primary hypertension 1/6/2016       Past Surgical History:   Procedure Laterality Date    BREAST SURGERY Bilateral     reduction    CARDIAC DEFIBRILLATOR PLACEMENT  2018    INSERTION OF PACEMAKER  2018    LAPAROSCOPIC CHOLECYSTECTOMY N/A 6/8/2022    Procedure: CHOLECYSTECTOMY, LAPAROSCOPIC;  Surgeon: Sonido Kennedy MD;  Location: [facility];  Service: General;  Laterality: N/A;    TUBAL LIGATION         Social History     Socioeconomic History    Marital status:    Tobacco Use    Smoking status: Never Smoker    Smokeless tobacco: Never Used   Substance and  "Sexual Activity    Alcohol use: No    Drug use: No    Sexual activity: Yes     Partners: Male         Medications/Allergies: See med card    Vitals:    22 1133   BP: (!) 183/81   Pulse: 85   SpO2: 97%   Weight: 85.3 kg (188 lb 0.8 oz)   Height: 5' 6" (1.676 m)   PainSc: 10-Worst pain ever   PainLoc: Back     Body mass index is 30.35 kg/m².    Physical exam:  Gen: A and O x3, pleasant, well-groomed  Skin: No rashes or obvious lesions  HEENT: PERRLA, no obvious deformities on ears or in canals. Trachea midline.  CVS: Regular rate and rhythm, normal palpable pulses.  Resp:No increased work of breathing, symmetrical chest rise.  Abdomen: Soft, NT/ND.  Musculoskeletal:      Neuro:    Iliopsoas Quadriceps Knee  Flexion Tibialis  anterior Gastro- cnemius EHL   Lower: R 4/5 5/5 4/5 4/5 4/5 4/5    L 5/5 5/5 5/5 5/5 5/5 5/5     Right lower extremity strength testing is pain limited.     Left  Right    Patellar DTR 2+ 2+   Achilles DTR 2+ 1+   Cali Absent  Absent   Clonus Absent Absent   Babinski Absent Absent     Intact and symmetrical to light touch and pinprick in L1-S1 dermatomes bilaterally.    Lumbar spine:  Lumbar spine: ROM is severely limited with flexion and extension with increased right low back pain during each maneuver.  Pain is especially worse with right oblique extension causing pain radiating into the posterior thigh.  Damian's test causes back pain only.  Supine straight leg raise causes right low back and right leg pain.  Internal and external rotation of the hip causes no increased pain on either side.  Myofascial exam:  Exquisite tenderness to palpation to the right lower thoracic paraspinous muscles and entire right lumbar paraspinous region.    Imagin22 CT Lumbar Spine  Vertebral column: The lumbar vertebral bodies maintain normal height.  There is no fracture.  Alignment is normal.  There is mild disc space narrowing at the L5-S1 level.     Spinal canal, conus, epidural space: The " spinal canal appears to be developmentally normal and widely patent.  There is no abnormal epidural mass or fluid collection.  The conus terminates approximately at the level of T12.     Findings by level:   The T11-12, T12-L1, L1-2 levels are essentially normal without spinal canal or foraminal stenosis.   L2-3: There is only very minimal bulging of the annulus.  There is no spinal canal or significant foraminal stenosis.   L3-4: There is minimal bulging of the annulus.  There is no spinal canal or significant foraminal stenosis.   L4-5: There is very mild facet joint arthropathy with a mild diffuse disc bulge.  There is only borderline spinal stenosis.  The foramina are patent.   L5-S1: There is a broad central disc protrusion with calcification which approaches and may just contact the left S1 root.  There is, however, no spinal stenosis.  There is mild bilateral foraminal stenosis.   Soft tissues, other: The prevertebral soft tissues are normal.  The aorta is normal in caliber.  There is mild atherosclerosis.  There is no hydronephrosis.    Assessment: Samaria Verma is a 46 y.o. year old female patient who has a past medical history of Anticoagulant long-term use, CHF (congestive heart failure), Chronic systolic congestive heart failure, Diabetes mellitus, Diabetes mellitus type 2 in obese, Hypertension, and Primary hypertension. She presents in referral from Dr. Dylan Martinez for right sided back pain.     1. Thoracic radiculopathy  CT Thoracic Spine Without Contrast   2. Lumbar radiculopathy     3. Right leg weakness     4. Type 2 diabetes mellitus with diabetic polyneuropathy, with long-term current use of insulin         Plan:    1.  I have reviewed the lumbar spine CT but I do not see anything that would seem to suggest why she is having such severe right leg and back pain but especially not the weakness.  I am going to get a thoracic spine CT to evaluate, if we do not see anything on that imaging, we may  need to get thoracic and lumbar CT myelogram.  I will call her with the results, in the meantime I have sent hydrocodone 7.5/325 since she is in such severe pain. [place] Board [employer] website checked and no aberrant patterns noted.      Thank you for referring this interesting patient, and I look forward to continuing to collaborate in her care.

## 2022-07-15 ENCOUNTER — TELEPHONE (OUTPATIENT)
Dept: PAIN MEDICINE | Facility: CLINIC | Age: 47
End: 2022-07-15
Payer: MEDICARE

## 2022-07-15 DIAGNOSIS — R29.898 RIGHT LEG WEAKNESS: ICD-10-CM

## 2022-07-15 DIAGNOSIS — M54.14 THORACIC RADICULOPATHY: Primary | ICD-10-CM

## 2022-07-15 DIAGNOSIS — M54.16 LUMBAR RADICULOPATHY: ICD-10-CM

## 2022-07-15 NOTE — TELEPHONE ENCOUNTER
Please let the patient know that I reviewed her thoracic spine CT and do not see anything that specifically would account for her pain and right leg weakness.  Please have her see 1 of the neurosurgery CARMELITA's and determine if they think this would be worth ordering a CT myelogram. Thanks

## 2022-07-18 ENCOUNTER — PATIENT MESSAGE (OUTPATIENT)
Dept: PAIN MEDICINE | Facility: CLINIC | Age: 47
End: 2022-07-18
Payer: MEDICARE

## 2022-07-19 RX ORDER — METHOCARBAMOL 500 MG/1
500 TABLET, FILM COATED ORAL 3 TIMES DAILY PRN
Qty: 90 TABLET | Refills: 0 | Status: ON HOLD | OUTPATIENT
Start: 2022-07-19 | End: 2022-07-28 | Stop reason: HOSPADM

## 2022-07-19 RX ORDER — SACUBITRIL AND VALSARTAN 24; 26 MG/1; MG/1
1 TABLET, FILM COATED ORAL 2 TIMES DAILY
Qty: 30 TABLET | Refills: 11 | Status: CANCELLED | OUTPATIENT
Start: 2022-07-19

## 2022-07-20 RX ORDER — SACUBITRIL AND VALSARTAN 24; 26 MG/1; MG/1
1 TABLET, FILM COATED ORAL 2 TIMES DAILY
Qty: 30 TABLET | Refills: 11 | Status: CANCELLED | OUTPATIENT
Start: 2022-07-19

## 2022-07-25 ENCOUNTER — OFFICE VISIT (OUTPATIENT)
Dept: NEUROSURGERY | Facility: CLINIC | Age: 47
End: 2022-07-25
Payer: MEDICARE

## 2022-07-25 VITALS
HEIGHT: 66 IN | RESPIRATION RATE: 18 BRPM | DIASTOLIC BLOOD PRESSURE: 82 MMHG | WEIGHT: 188 LBS | SYSTOLIC BLOOD PRESSURE: 156 MMHG | HEART RATE: 83 BPM | BODY MASS INDEX: 30.22 KG/M2

## 2022-07-25 DIAGNOSIS — R29.898 UPPER EXTREMITY WEAKNESS: Primary | ICD-10-CM

## 2022-07-25 DIAGNOSIS — M54.14 THORACIC RADICULOPATHY: ICD-10-CM

## 2022-07-25 DIAGNOSIS — R29.898 RIGHT LEG WEAKNESS: ICD-10-CM

## 2022-07-25 DIAGNOSIS — M54.16 LUMBAR RADICULOPATHY: ICD-10-CM

## 2022-07-25 PROCEDURE — 3008F BODY MASS INDEX DOCD: CPT | Mod: CPTII,S$GLB,, | Performed by: PHYSICIAN ASSISTANT

## 2022-07-25 PROCEDURE — 3077F SYST BP >= 140 MM HG: CPT | Mod: CPTII,S$GLB,, | Performed by: PHYSICIAN ASSISTANT

## 2022-07-25 PROCEDURE — 3060F POS MICROALBUMINURIA REV: CPT | Mod: CPTII,S$GLB,, | Performed by: PHYSICIAN ASSISTANT

## 2022-07-25 PROCEDURE — 3046F PR MOST RECENT HEMOGLOBIN A1C LEVEL > 9.0%: ICD-10-PCS | Mod: CPTII,S$GLB,, | Performed by: PHYSICIAN ASSISTANT

## 2022-07-25 PROCEDURE — 3079F PR MOST RECENT DIASTOLIC BLOOD PRESSURE 80-89 MM HG: ICD-10-PCS | Mod: CPTII,S$GLB,, | Performed by: PHYSICIAN ASSISTANT

## 2022-07-25 PROCEDURE — 3066F NEPHROPATHY DOC TX: CPT | Mod: CPTII,S$GLB,, | Performed by: PHYSICIAN ASSISTANT

## 2022-07-25 PROCEDURE — 3079F DIAST BP 80-89 MM HG: CPT | Mod: CPTII,S$GLB,, | Performed by: PHYSICIAN ASSISTANT

## 2022-07-25 PROCEDURE — 3008F PR BODY MASS INDEX (BMI) DOCUMENTED: ICD-10-PCS | Mod: CPTII,S$GLB,, | Performed by: PHYSICIAN ASSISTANT

## 2022-07-25 PROCEDURE — 99205 OFFICE O/P NEW HI 60 MIN: CPT | Mod: S$GLB,,, | Performed by: PHYSICIAN ASSISTANT

## 2022-07-25 PROCEDURE — 99205 PR OFFICE/OUTPT VISIT, NEW, LEVL V, 60-74 MIN: ICD-10-PCS | Mod: S$GLB,,, | Performed by: PHYSICIAN ASSISTANT

## 2022-07-25 PROCEDURE — 3066F PR DOCUMENTATION OF TREATMENT FOR NEPHROPATHY: ICD-10-PCS | Mod: CPTII,S$GLB,, | Performed by: PHYSICIAN ASSISTANT

## 2022-07-25 PROCEDURE — 4010F PR ACE/ARB THEARPY RXD/TAKEN: ICD-10-PCS | Mod: CPTII,S$GLB,, | Performed by: PHYSICIAN ASSISTANT

## 2022-07-25 PROCEDURE — 4010F ACE/ARB THERAPY RXD/TAKEN: CPT | Mod: CPTII,S$GLB,, | Performed by: PHYSICIAN ASSISTANT

## 2022-07-25 PROCEDURE — 3077F PR MOST RECENT SYSTOLIC BLOOD PRESSURE >= 140 MM HG: ICD-10-PCS | Mod: CPTII,S$GLB,, | Performed by: PHYSICIAN ASSISTANT

## 2022-07-25 PROCEDURE — 3060F PR POS MICROALBUMINURIA RESULT DOCUMENTED/REVIEW: ICD-10-PCS | Mod: CPTII,S$GLB,, | Performed by: PHYSICIAN ASSISTANT

## 2022-07-25 PROCEDURE — 3046F HEMOGLOBIN A1C LEVEL >9.0%: CPT | Mod: CPTII,S$GLB,, | Performed by: PHYSICIAN ASSISTANT

## 2022-07-25 RX ORDER — HYDROCODONE BITARTRATE AND ACETAMINOPHEN 7.5; 325 MG/1; MG/1
1 TABLET ORAL EVERY 6 HOURS PRN
Qty: 30 TABLET | Refills: 0 | Status: CANCELLED | OUTPATIENT
Start: 2022-07-25 | End: 2022-08-24

## 2022-07-25 RX ORDER — SACUBITRIL AND VALSARTAN 24; 26 MG/1; MG/1
1 TABLET, FILM COATED ORAL 2 TIMES DAILY
Qty: 180 TABLET | Refills: 3 | Status: SHIPPED | OUTPATIENT
Start: 2022-07-25

## 2022-07-25 NOTE — PROGRESS NOTES
Neurosurgery History & Physical    Patient ID: Samaria Verma is a 46 y.o. female.    Chief Complaint   Patient presents with    Image Review     Patient reports to clinic today for image review. Referral from PM/Dr. Ojeda. Mid back pain with numbness/tingling into Rt leg. Reports pain has been ongoing about 1 month. Denies any bowel/bladder incontinence.        Review of Systems   Constitutional: Negative for chills, diaphoresis, fatigue and fever.   HENT: Negative for congestion, ear pain, rhinorrhea, sneezing, sore throat and tinnitus.    Eyes: Negative for photophobia, pain, redness and visual disturbance.   Respiratory: Negative for cough, chest tightness, shortness of breath and wheezing.    Cardiovascular: Negative for chest pain, palpitations and leg swelling.   Gastrointestinal: Negative for abdominal distention, abdominal pain, constipation, diarrhea, nausea and vomiting.   Genitourinary: Negative for difficulty urinating, dysuria, frequency and urgency.   Musculoskeletal: Positive for back pain. Negative for gait problem, myalgias and neck pain.   Skin: Negative for pallor and rash.   Neurological: Positive for weakness and numbness. Negative for dizziness, seizures, speech difficulty and headaches.   Psychiatric/Behavioral: Negative for confusion and hallucinations.       Past Medical History:   Diagnosis Date    Anticoagulant long-term use     CHF (congestive heart failure)     Chronic systolic congestive heart failure 3/7/2017    Diabetes mellitus     Diabetes mellitus type 2 in obese 1/6/2016    Hypertension     Primary hypertension 1/6/2016     Social History     Socioeconomic History    Marital status:    Tobacco Use    Smoking status: Never Smoker    Smokeless tobacco: Never Used   Substance and Sexual Activity    Alcohol use: No    Drug use: No    Sexual activity: Yes     Partners: Male     Family History   Problem Relation Age of Onset    Heart disease Mother     Arthritis  Mother     Heart disease Father     Hypertension Father     Asthma Son     Heart disease Paternal Grandmother     Pulmonary embolism Unknown     Glaucoma Neg Hx     Macular degeneration Neg Hx     Retinal detachment Neg Hx      Review of patient's allergies indicates:   Allergen Reactions    Lisinopril Rash       Current Outpatient Medications:     aspirin (ECOTRIN) 81 MG EC tablet, Take 1 tablet (81 mg total) by mouth once daily., Disp: 30 tablet, Rfl: 11    atorvastatin (LIPITOR) 40 MG tablet, Take 1 tablet (40 mg total) by mouth once daily., Disp: 90 tablet, Rfl: 3    betamethasone dipropionate 0.05 % cream, Apply topically 2 (two) times daily., Disp: 45 g, Rfl: 0    blood sugar diagnostic Strp, Use to test blood glucose 4 (four) times daily before meals and nightly., Disp: 200 each, Rfl: 11    blood-glucose meter (ACCU-CHEK GUIDE GLUCOSE METER) Misc, Use to test blood glucose 4 (four) times daily before meals and nightly., Disp: 1 each, Rfl: 0    blood-glucose meter,continuous (DEXCOM ) Misc, Use to check blood glucose at least 4 times a day, Disp: 1 each, Rfl: 0    blood-glucose sensor (DEXCOM G5-G4 SENSOR) Bhumika, Apply 1 sensor every 10 days, Disp: 9 each, Rfl: 3    blood-glucose transmitter (DEXCOM G5 TRANSMITTER) Bhumika, Replace every 90 days, Disp: 1 each, Rfl: 3    carvediloL (COREG) 6.25 MG tablet, Take 1 tablet (6.25 mg total) by mouth 2 (two) times daily., Disp: 60 tablet, Rfl: 11    furosemide (LASIX) 40 MG tablet, Take 1 tablet (40 mg total) by mouth 2 (two) times daily. (Patient taking differently: Take 40 mg by mouth once daily. Takes once daily in morning), Disp: 30 tablet, Rfl: 1    gabapentin (NEURONTIN) 300 MG capsule, Take 2 capsules (600 mg total) by mouth 2 (two) times daily., Disp: 120 capsule, Rfl: 3    HYDROcodone-acetaminophen (NORCO) 7.5-325 mg per tablet, Take 1 tablet by mouth every 6 (six) hours as needed for Pain., Disp: 30 tablet, Rfl: 0    insulin aspart  "U-100 (NOVOLOG FLEXPEN U-100 INSULIN) 100 unit/mL (3 mL) InPn pen, Inject 15 Units into the skin 4 (four) times daily with meals and nightly. Plus sliding scale, max of 90 units per day, Disp: 81 mL, Rfl: 1    insulin aspart U-100 (NOVOLOG FLEXPEN U-100 INSULIN) 100 unit/mL (3 mL) InPn pen, 15 units with meals plus ss, or max daily dose of 100 units., Disp: 30 mL, Rfl: 6    insulin degludec (TRESIBA FLEXTOUCH U-200) 200 unit/mL (3 mL) insulin pen, Inject 100 Units into the skin every evening., Disp: 5 pen, Rfl: 6    insulin syringe-needle U-100 (BD INSULIN SYRINGE ULTRA-FINE) 1 mL 31 gauge x 5/16 Syrg, Use to inject insulin 5 times daily, Disp: 100 each, Rfl: 11    lancets (ACCU-CHEK SOFTCLIX LANCETS) Misc, Use to check blood glucose 4 (four) times daily before meals and nightly., Disp: 200 each, Rfl: 5    methocarbamoL (ROBAXIN) 500 MG Tab, Take 1 tablet (500 mg total) by mouth 3 (three) times daily as needed (spasm)., Disp: 90 tablet, Rfl: 0    nitroGLYCERIN (NITROSTAT) 0.4 MG SL tablet, Place 1 tablet (0.4 mg total) under the tongue every 5 (five) minutes as needed for Chest pain., Disp: 25 tablet, Rfl: 0    ondansetron (ZOFRAN) 4 MG tablet, Take 1 tablet (4 mg total) by mouth every 6 (six) hours as needed for Nausea., Disp: 20 tablet, Rfl: 0    pen needle, diabetic 31 gauge x 5/16" Ndle, Use to inject insulin 4 times daily, Disp: 300 each, Rfl: 3    sacubitriL-valsartan (ENTRESTO) 24-26 mg per tablet, Take 1 tablet by mouth 2 (two) times daily., Disp: 30 tablet, Rfl: 11    famotidine (PEPCID) 20 MG tablet, Take 1 tablet (20 mg total) by mouth 2 (two) times daily. for 5 days, Disp: 10 tablet, Rfl: 0  Blood pressure (!) 156/82, pulse 83, resp. rate 18, height 5' 6" (1.676 m), weight 85.3 kg (188 lb).      Neurologic Exam     Mental Status   Oriented to person, place, and time.   Oriented to person.   Oriented to place.   Oriented to time.   Follows 3 step commands.   Attention: normal. Concentration: " normal.   Speech: speech is normal   Level of consciousness: alert  Knowledge: consistent with education.   Able to name object. Able to read. Able to repeat. Able to write. Normal comprehension.      Cranial Nerves      CN II   Visual acuity: normal  Right visual field deficit: none  Left visual field deficit: none      CN III, IV, VI   Pupils are equal, round, and reactive to light.  Right pupil: Size: 3 mm. Shape: regular. Reactivity: brisk. Consensual response: intact.   Left pupil: Size: 3 mm. Shape: regular. Reactivity: brisk. Consensual response: intact.   CN III: no CN III palsy  CN VI: no CN VI palsy  Nystagmus: none   Diplopia: none  Ophthalmoparesis: none  Conjugate gaze: present     CN V   Right facial sensation deficit: none  Left facial sensation deficit: none     CN VII   Right facial weakness: none  Left facial weakness: none     CN VIII   Hearing: intact     CN IX, X   CN IX normal.   CN X normal.      CN XI   Right sternocleidomastoid strength: normal  Left sternocleidomastoid strength: normal  Right trapezius strength: normal  Left trapezius strength: normal     CN XII   Fasciculations: absent  Tongue deviation: none     Motor Exam   Muscle bulk: normal  Overall muscle tone: normal  Right arm pronator drift: absent  Left arm pronator drift: absent     Strength-some pain limitation  Right deltoid: 4+/5  Left deltoid: 4+/5  Right biceps: 4/5  Left biceps: 4/5  Right triceps: 4/5  Left triceps: 5/5  Right wrist flexion: 5/5  Left wrist flexion: 5/5  Right wrist extension: 5/5  Left wrist extension: 5/5  Right interossei: 5/5  Left interossei: 5/5  Right iliopsoas: 4/5  Left iliopsoas: 5/5  Right quadriceps: 5/5  Left quadriceps: 5/5  Right hamstrin/5  Left hamstrin/5  Right anterior tibial: 5/5  Left anterior tibial: 5/5  Right posterior tibial: 5/5  Left posterior tibial: 5/5  Right peroneal: 5/5  Left peroneal: 5/5  Right gastroc: 5/5  Left gastroc: 5/5     Sensory Exam   Right arm light  touch: numbness right C6, C7, C8 distribution  Left arm light touch: normal  Right leg light touch: normal  Left leg light touch: numbness left foot     Gait, Coordination, and Reflexes      Gait  Gait: normal      Coordination   Romberg: negative  Finger to nose coordination: normal  Heel to shin coordination: normal  Tandem walking coordination: normal     Tremor   Resting tremor: absent  Intention tremor: absent  Action tremor: absent     Reflexes   Right brachioradialis: 3+  Left brachioradialis: 2+  Right biceps: 2+  Left biceps: 2+  Right triceps: 3+  Left triceps: 3+  Right patellar: 3+  Left patellar: 3+  Right achilles: 1+  Left achilles: 1+  Right Cali: absent  Left Cali: absent  Right ankle clonus: absent  Left ankle clonus: absent  Right plantar: normal  Left plantar: normal      Physical Exam  Constitutional: Oriented to person, place, and time. Appears well-developed and well-nourished.   HENT:   Head: Normocephalic and atraumatic.   Eyes: Pupils are equal, round, and reactive to light.   Neck: Normal range of motion. Neck supple.   Cardiovascular: Normal rate.    Pulmonary/Chest: Effort normal.   Musculoskeletal: Normal range of motion. Exhibits no edema.   Neurological: Alert and oriented to person, place, and time. Normal Finger-Nose-Finger Test, a normal Heel to Shin Test, a normal Romberg Test and a normal Tandem Gait Test. Gait normal.   Reflex Scores:       Tricep reflexes are 3+ on the right side and 3+ on the left side.       Bicep reflexes are 2+ on the right side and 2+ on the left side.       Brachioradialis reflexes are 3+ on the right side and 2+ on the left side.       Patellar reflexes are 3+ on the right side and 3+ on the left side.       Achilles reflexes are 1+ on the right side and 1+ on the left side.  Skin: Skin is warm, dry and intact.   Psychiatric: Normal mood and affect. Speech is normal and behavior is normal. Judgment and thought content normal.   Nursing note and  vitals reviewed.      Provider dictation:  I reviewed the imaging.   CT Lumbar spine shows an L5-S1 disc protrusion, but unable to fully evaluate for stenosis with current imaging.   CT thoracic spine shows no significant findings.     The patient is a 46 year old female with past medical history of Anticoagulant long-term use, CHF, DM, and HTN who presents for neurosurgical evaluation referred by Dr. Ojeda. Of note, patient has a pacemaker and is unable to have an MRI done. The patient reports a 1 month history of pain in the right upper thoracic spine and lower back that is constant. She states this pain was originally thought to be due to cholecystitis and she underwent a cholecystectomy on 06/08/22 without relief of her pain.  She has intermittent tingling in the right lower extremity that radiates into the shin. The pain will occasionally radiate into the right buttock, hip, posterior thigh and calf. She also reports some right foot weakness. Denies bowel/bladder dysfunction. Denies any pain radiating to her chest or abdomen. She has not undergone prior BENNETT or PT for these symptoms. She has been taking Gabapentin with no relief and Hydrocodone with mild relief.     On exam patient has some pain limited upper extremity weakness. There is right hip flexion weakness. She has upper extremity and patellar hyperreflexia.     I will obtain a CT myelogram of the cervical spine given patient's upper extremity weakness and hyperreflexia. I will also obtain a CT myelogram of the thoracic and lumbar spine to further evaluate for compressive pathology given patient's thoracic and lumbar spine pain as well as symptoms of lumbar radiculopathy. I will call the patient with the results and further plan.       Face to Face time with patient: 25 min  68 minutes of total time spent on the encounter, which includes face to face time and non-face to face time preparing to see the patient (eg, review of tests), Obtaining and/or  reviewing separately obtained history, Documenting clinical information in the electronic or other health record, Independently interpreting results (not separately reported) and communicating results to the patient/family/caregiver, or Care coordination (not separately reported).       1. Upper extremity weakness     2. Thoracic radiculopathy  Ambulatory referral/consult to Neurosurgery   3. Lumbar radiculopathy  Ambulatory referral/consult to Neurosurgery   4. Right leg weakness  Ambulatory referral/consult to Neurosurgery

## 2022-07-27 ENCOUNTER — HOSPITAL ENCOUNTER (INPATIENT)
Facility: HOSPITAL | Age: 47
LOS: 1 days | Discharge: HOME HEALTH | DRG: 880 | End: 2022-07-28
Attending: EMERGENCY MEDICINE | Admitting: INTERNAL MEDICINE
Payer: MEDICARE

## 2022-07-27 DIAGNOSIS — I63.511 STROKE DUE TO OCCLUSION OF RIGHT MIDDLE CEREBRAL ARTERY: ICD-10-CM

## 2022-07-27 DIAGNOSIS — I50.9 CHF (CONGESTIVE HEART FAILURE): ICD-10-CM

## 2022-07-27 DIAGNOSIS — I63.9 STROKE: ICD-10-CM

## 2022-07-27 DIAGNOSIS — F44.7 FUNCTIONAL NEUROLOGICAL SYMPTOM DISORDER WITH MIXED SYMPTOMS: Primary | ICD-10-CM

## 2022-07-27 DIAGNOSIS — I63.9 CVA (CEREBRAL VASCULAR ACCIDENT): ICD-10-CM

## 2022-07-27 PROBLEM — I50.22 CHRONIC SYSTOLIC CONGESTIVE HEART FAILURE: Chronic | Status: ACTIVE | Noted: 2017-03-07

## 2022-07-27 PROBLEM — R10.811 RUQ ABDOMINAL TENDERNESS: Status: ACTIVE | Noted: 2022-07-27

## 2022-07-27 LAB
ABO + RH BLD: NORMAL
ALBUMIN SERPL BCP-MCNC: 3.7 G/DL (ref 3.5–5.2)
ALP SERPL-CCNC: 92 U/L (ref 55–135)
ALT SERPL W/O P-5'-P-CCNC: 19 U/L (ref 10–44)
ANION GAP SERPL CALC-SCNC: 13 MMOL/L (ref 8–16)
ASCENDING AORTA: 2.63 CM
AST SERPL-CCNC: 17 U/L (ref 10–40)
AV INDEX (PROSTH): 0.64
AV MEAN GRADIENT: 4 MMHG
AV PEAK GRADIENT: 8 MMHG
AV VALVE AREA: 2.08 CM2
AV VELOCITY RATIO: 0.5
BACTERIA #/AREA URNS AUTO: ABNORMAL /HPF
BASOPHILS # BLD AUTO: 0.03 K/UL (ref 0–0.2)
BASOPHILS NFR BLD: 0.2 % (ref 0–1.9)
BILIRUB SERPL-MCNC: 0.1 MG/DL (ref 0.1–1)
BILIRUB UR QL STRIP: NEGATIVE
BLD GP AB SCN CELLS X3 SERPL QL: NORMAL
BSA FOR ECHO PROCEDURE: 2 M2
BUN SERPL-MCNC: 18 MG/DL (ref 6–20)
BUN SERPL-MCNC: 23 MG/DL (ref 6–30)
CALCIUM SERPL-MCNC: 9.7 MG/DL (ref 8.7–10.5)
CHLORIDE SERPL-SCNC: 102 MMOL/L (ref 95–110)
CHLORIDE SERPL-SCNC: 103 MMOL/L (ref 95–110)
CHOLEST SERPL-MCNC: 179 MG/DL (ref 120–199)
CHOLEST SERPL-MCNC: 183 MG/DL (ref 120–199)
CHOLEST/HDLC SERPL: 4.8 {RATIO} (ref 2–5)
CHOLEST/HDLC SERPL: 5.2 {RATIO} (ref 2–5)
CLARITY UR REFRACT.AUTO: ABNORMAL
CO2 SERPL-SCNC: 22 MMOL/L (ref 23–29)
COLOR UR AUTO: YELLOW
CREAT SERPL-MCNC: 0.9 MG/DL (ref 0.5–1.4)
CREAT SERPL-MCNC: 1.2 MG/DL (ref 0.5–1.4)
CV ECHO LV RWT: 0.42 CM
DIFFERENTIAL METHOD: ABNORMAL
DOP CALC AO PEAK VEL: 1.41 M/S
DOP CALC AO VTI: 24.92 CM
DOP CALC LVOT AREA: 3.2 CM2
DOP CALC LVOT DIAMETER: 2.03 CM
DOP CALC LVOT PEAK VEL: 0.7 M/S
DOP CALC LVOT STROKE VOLUME: 51.89 CM3
DOP CALCLVOT PEAK VEL VTI: 16.04 CM
ECHO LV POSTERIOR WALL: 1.1 CM (ref 0.6–1.1)
EJECTION FRACTION: 45 %
EOSINOPHIL # BLD AUTO: 0.1 K/UL (ref 0–0.5)
EOSINOPHIL NFR BLD: 1 % (ref 0–8)
ERYTHROCYTE [DISTWIDTH] IN BLOOD BY AUTOMATED COUNT: 13.2 % (ref 11.5–14.5)
EST. GFR  (AFRICAN AMERICAN): >60 ML/MIN/1.73 M^2
EST. GFR  (NON AFRICAN AMERICAN): >60 ML/MIN/1.73 M^2
FRACTIONAL SHORTENING: 22 % (ref 28–44)
GLUCOSE SERPL-MCNC: 100 MG/DL (ref 70–110)
GLUCOSE SERPL-MCNC: 133 MG/DL (ref 70–110)
GLUCOSE UR QL STRIP: NEGATIVE
HCT VFR BLD AUTO: 34.7 % (ref 37–48.5)
HCT VFR BLD CALC: 40 %PCV (ref 36–54)
HDLC SERPL-MCNC: 35 MG/DL (ref 40–75)
HDLC SERPL-MCNC: 37 MG/DL (ref 40–75)
HDLC SERPL: 19.1 % (ref 20–50)
HDLC SERPL: 20.7 % (ref 20–50)
HGB BLD-MCNC: 11.1 G/DL (ref 12–16)
HGB UR QL STRIP: NEGATIVE
HYALINE CASTS UR QL AUTO: 0 /LPF
IMM GRANULOCYTES # BLD AUTO: 0.03 K/UL (ref 0–0.04)
IMM GRANULOCYTES NFR BLD AUTO: 0.2 % (ref 0–0.5)
INR PPP: 1.1 (ref 0.8–1.2)
INTERVENTRICULAR SEPTUM: 1 CM (ref 0.6–1.1)
KETONES UR QL STRIP: NEGATIVE
LA MAJOR: 6.24 CM
LA MINOR: 5.64 CM
LA WIDTH: 2.88 CM
LDLC SERPL CALC-MCNC: 70.6 MG/DL (ref 63–159)
LDLC SERPL CALC-MCNC: ABNORMAL MG/DL (ref 63–159)
LEFT ATRIUM SIZE: 4.4 CM
LEFT ATRIUM VOLUME INDEX: 32.6 ML/M2
LEFT ATRIUM VOLUME: 63.82 CM3
LEFT INTERNAL DIMENSION IN SYSTOLE: 4.05 CM (ref 2.1–4)
LEFT VENTRICLE DIASTOLIC VOLUME INDEX: 65.85 ML/M2
LEFT VENTRICLE DIASTOLIC VOLUME: 129.06 ML
LEFT VENTRICLE MASS INDEX: 105 G/M2
LEFT VENTRICLE SYSTOLIC VOLUME INDEX: 36.8 ML/M2
LEFT VENTRICLE SYSTOLIC VOLUME: 72.21 ML
LEFT VENTRICULAR INTERNAL DIMENSION IN DIASTOLE: 5.19 CM (ref 3.5–6)
LEFT VENTRICULAR MASS: 206.62 G
LEUKOCYTE ESTERASE UR QL STRIP: ABNORMAL
LYMPHOCYTES # BLD AUTO: 3.6 K/UL (ref 1–4.8)
LYMPHOCYTES NFR BLD: 26.5 % (ref 18–48)
MAGNESIUM SERPL-MCNC: 1.7 MG/DL (ref 1.6–2.6)
MCH RBC QN AUTO: 26.9 PG (ref 27–31)
MCHC RBC AUTO-ENTMCNC: 32 G/DL (ref 32–36)
MCV RBC AUTO: 84 FL (ref 82–98)
MICROSCOPIC COMMENT: ABNORMAL
MONOCYTES # BLD AUTO: 0.8 K/UL (ref 0.3–1)
MONOCYTES NFR BLD: 5.9 % (ref 4–15)
NEUTROPHILS # BLD AUTO: 9 K/UL (ref 1.8–7.7)
NEUTROPHILS NFR BLD: 66.2 % (ref 38–73)
NITRITE UR QL STRIP: NEGATIVE
NONHDLC SERPL-MCNC: 142 MG/DL
NONHDLC SERPL-MCNC: 148 MG/DL
NRBC BLD-RTO: 0 /100 WBC
PH UR STRIP: 5 [PH] (ref 5–8)
PHOSPHATE SERPL-MCNC: 5.7 MG/DL (ref 2.7–4.5)
PISA TR MAX VEL: 2.44 M/S
PLATELET # BLD AUTO: 305 K/UL (ref 150–450)
PMV BLD AUTO: 9.7 FL (ref 9.2–12.9)
POC IONIZED CALCIUM: 1.21 MMOL/L (ref 1.06–1.42)
POC TCO2 (MEASURED): 30 MMOL/L (ref 23–29)
POCT GLUCOSE: 147 MG/DL (ref 70–110)
POCT GLUCOSE: 207 MG/DL (ref 70–110)
POCT GLUCOSE: 243 MG/DL (ref 70–110)
POTASSIUM BLD-SCNC: 3.8 MMOL/L (ref 3.5–5.1)
POTASSIUM SERPL-SCNC: 3.1 MMOL/L (ref 3.5–5.1)
PROT SERPL-MCNC: 7.2 G/DL (ref 6–8.4)
PROT UR QL STRIP: ABNORMAL
PROTHROMBIN TIME: 11.2 SEC (ref 9–12.5)
QEF: 44 %
RA MAJOR: 4.93 CM
RA WIDTH: 3.01 CM
RBC # BLD AUTO: 4.13 M/UL (ref 4–5.4)
RBC #/AREA URNS AUTO: 15 /HPF (ref 0–4)
RIGHT VENTRICULAR END-DIASTOLIC DIMENSION: 3.14 CM
SAMPLE: ABNORMAL
SINUS: 2.65 CM
SODIUM BLD-SCNC: 139 MMOL/L (ref 136–145)
SODIUM SERPL-SCNC: 137 MMOL/L (ref 136–145)
SP GR UR STRIP: 1.01 (ref 1–1.03)
STJ: 2.33 CM
TDI LATERAL: 0.1 M/S
TDI SEPTAL: 0.07 M/S
TDI: 0.09 M/S
TR MAX PG: 24 MMHG
TRIGL SERPL-MCNC: 387 MG/DL (ref 30–150)
TRIGL SERPL-MCNC: 438 MG/DL (ref 30–150)
TSH SERPL DL<=0.005 MIU/L-ACNC: 1.69 UIU/ML (ref 0.4–4)
TSH SERPL DL<=0.005 MIU/L-ACNC: 1.84 UIU/ML (ref 0.4–4)
URN SPEC COLLECT METH UR: ABNORMAL
WBC # BLD AUTO: 13.59 K/UL (ref 3.9–12.7)
WBC #/AREA URNS AUTO: 88 /HPF (ref 0–5)

## 2022-07-27 PROCEDURE — 99223 PR INITIAL HOSPITAL CARE,LEVL III: ICD-10-PCS | Mod: ,,, | Performed by: PSYCHIATRY & NEUROLOGY

## 2022-07-27 PROCEDURE — 63600175 PHARM REV CODE 636 W HCPCS

## 2022-07-27 PROCEDURE — 84443 ASSAY THYROID STIM HORMONE: CPT

## 2022-07-27 PROCEDURE — 80061 LIPID PANEL: CPT | Mod: 91 | Performed by: EMERGENCY MEDICINE

## 2022-07-27 PROCEDURE — 92610 EVALUATE SWALLOWING FUNCTION: CPT

## 2022-07-27 PROCEDURE — 99291 CRITICAL CARE FIRST HOUR: CPT | Mod: ,,,

## 2022-07-27 PROCEDURE — 97112 NEUROMUSCULAR REEDUCATION: CPT

## 2022-07-27 PROCEDURE — 80061 LIPID PANEL: CPT

## 2022-07-27 PROCEDURE — 97535 SELF CARE MNGMENT TRAINING: CPT

## 2022-07-27 PROCEDURE — 25000003 PHARM REV CODE 250

## 2022-07-27 PROCEDURE — 86850 RBC ANTIBODY SCREEN: CPT

## 2022-07-27 PROCEDURE — 63600175 PHARM REV CODE 636 W HCPCS: Performed by: INTERNAL MEDICINE

## 2022-07-27 PROCEDURE — 84100 ASSAY OF PHOSPHORUS: CPT

## 2022-07-27 PROCEDURE — 97165 OT EVAL LOW COMPLEX 30 MIN: CPT

## 2022-07-27 PROCEDURE — 87147 CULTURE TYPE IMMUNOLOGIC: CPT

## 2022-07-27 PROCEDURE — 83735 ASSAY OF MAGNESIUM: CPT

## 2022-07-27 PROCEDURE — 99291 PR CRITICAL CARE, E/M 30-74 MINUTES: ICD-10-PCS | Mod: ,,, | Performed by: EMERGENCY MEDICINE

## 2022-07-27 PROCEDURE — 87088 URINE BACTERIA CULTURE: CPT

## 2022-07-27 PROCEDURE — 97161 PT EVAL LOW COMPLEX 20 MIN: CPT

## 2022-07-27 PROCEDURE — 85610 PROTHROMBIN TIME: CPT | Performed by: EMERGENCY MEDICINE

## 2022-07-27 PROCEDURE — 87086 URINE CULTURE/COLONY COUNT: CPT

## 2022-07-27 PROCEDURE — 63600175 PHARM REV CODE 636 W HCPCS: Mod: JG

## 2022-07-27 PROCEDURE — 93005 ELECTROCARDIOGRAM TRACING: CPT

## 2022-07-27 PROCEDURE — 99291 CRITICAL CARE FIRST HOUR: CPT | Mod: 25

## 2022-07-27 PROCEDURE — 25500020 PHARM REV CODE 255: Performed by: INTERNAL MEDICINE

## 2022-07-27 PROCEDURE — 81001 URINALYSIS AUTO W/SCOPE: CPT

## 2022-07-27 PROCEDURE — 20000000 HC ICU ROOM

## 2022-07-27 PROCEDURE — 63600175 PHARM REV CODE 636 W HCPCS: Performed by: EMERGENCY MEDICINE

## 2022-07-27 PROCEDURE — 99291 PR CRITICAL CARE, E/M 30-74 MINUTES: ICD-10-PCS | Mod: ,,,

## 2022-07-27 PROCEDURE — 99291 CRITICAL CARE FIRST HOUR: CPT | Mod: ,,, | Performed by: EMERGENCY MEDICINE

## 2022-07-27 PROCEDURE — 25000003 PHARM REV CODE 250: Performed by: STUDENT IN AN ORGANIZED HEALTH CARE EDUCATION/TRAINING PROGRAM

## 2022-07-27 PROCEDURE — 99292 CRITICAL CARE ADDL 30 MIN: CPT | Mod: 25

## 2022-07-27 PROCEDURE — 80053 COMPREHEN METABOLIC PANEL: CPT | Performed by: EMERGENCY MEDICINE

## 2022-07-27 PROCEDURE — 25500020 PHARM REV CODE 255: Performed by: EMERGENCY MEDICINE

## 2022-07-27 PROCEDURE — 25000003 PHARM REV CODE 250: Performed by: NURSE PRACTITIONER

## 2022-07-27 PROCEDURE — 85025 COMPLETE CBC W/AUTO DIFF WBC: CPT | Performed by: EMERGENCY MEDICINE

## 2022-07-27 PROCEDURE — 93010 EKG 12-LEAD: ICD-10-PCS | Mod: ,,, | Performed by: INTERNAL MEDICINE

## 2022-07-27 PROCEDURE — 97116 GAIT TRAINING THERAPY: CPT

## 2022-07-27 PROCEDURE — 99223 1ST HOSP IP/OBS HIGH 75: CPT | Mod: ,,, | Performed by: PSYCHIATRY & NEUROLOGY

## 2022-07-27 PROCEDURE — 25000003 PHARM REV CODE 250: Performed by: EMERGENCY MEDICINE

## 2022-07-27 PROCEDURE — 25000003 PHARM REV CODE 250: Performed by: INTERNAL MEDICINE

## 2022-07-27 PROCEDURE — 94761 N-INVAS EAR/PLS OXIMETRY MLT: CPT

## 2022-07-27 PROCEDURE — 93010 ELECTROCARDIOGRAM REPORT: CPT | Mod: ,,, | Performed by: INTERNAL MEDICINE

## 2022-07-27 PROCEDURE — 84443 ASSAY THYROID STIM HORMONE: CPT | Mod: 91 | Performed by: EMERGENCY MEDICINE

## 2022-07-27 RX ORDER — LABETALOL HCL 20 MG/4 ML
10 SYRINGE (ML) INTRAVENOUS
Status: DISCONTINUED | OUTPATIENT
Start: 2022-07-27 | End: 2022-07-27

## 2022-07-27 RX ORDER — NAPROXEN SODIUM 220 MG/1
81 TABLET, FILM COATED ORAL DAILY
Status: DISCONTINUED | OUTPATIENT
Start: 2022-07-28 | End: 2022-07-28 | Stop reason: HOSPADM

## 2022-07-27 RX ORDER — TRANEXAMIC ACID 100 MG/ML
1000 INJECTION, SOLUTION INTRAVENOUS
Status: DISCONTINUED | OUTPATIENT
Start: 2022-07-27 | End: 2022-07-27

## 2022-07-27 RX ORDER — IBUPROFEN 200 MG
16 TABLET ORAL
Status: DISCONTINUED | OUTPATIENT
Start: 2022-07-27 | End: 2022-07-28 | Stop reason: HOSPADM

## 2022-07-27 RX ORDER — ATORVASTATIN CALCIUM 20 MG/1
40 TABLET, FILM COATED ORAL DAILY
Status: DISCONTINUED | OUTPATIENT
Start: 2022-07-27 | End: 2022-07-28 | Stop reason: HOSPADM

## 2022-07-27 RX ORDER — ONDANSETRON 2 MG/ML
4 INJECTION INTRAMUSCULAR; INTRAVENOUS EVERY 8 HOURS PRN
Status: DISCONTINUED | OUTPATIENT
Start: 2022-07-27 | End: 2022-07-28 | Stop reason: HOSPADM

## 2022-07-27 RX ORDER — ACETAMINOPHEN 500 MG
1000 TABLET ORAL EVERY 6 HOURS PRN
Status: DISCONTINUED | OUTPATIENT
Start: 2022-07-27 | End: 2022-07-28 | Stop reason: HOSPADM

## 2022-07-27 RX ORDER — ACETAMINOPHEN 500 MG
1000 TABLET ORAL
Status: COMPLETED | OUTPATIENT
Start: 2022-07-27 | End: 2022-07-27

## 2022-07-27 RX ORDER — ACETAMINOPHEN 325 MG/1
650 TABLET ORAL EVERY 6 HOURS PRN
Status: DISCONTINUED | OUTPATIENT
Start: 2022-07-27 | End: 2022-07-27

## 2022-07-27 RX ORDER — SODIUM CHLORIDE 0.9 % (FLUSH) 0.9 %
10 SYRINGE (ML) INJECTION
Status: DISCONTINUED | OUTPATIENT
Start: 2022-07-27 | End: 2022-07-28 | Stop reason: HOSPADM

## 2022-07-27 RX ORDER — FENTANYL CITRATE 50 UG/ML
25 INJECTION, SOLUTION INTRAMUSCULAR; INTRAVENOUS ONCE
Status: COMPLETED | OUTPATIENT
Start: 2022-07-27 | End: 2022-07-27

## 2022-07-27 RX ORDER — MORPHINE SULFATE 2 MG/ML
6 INJECTION, SOLUTION INTRAMUSCULAR; INTRAVENOUS
Status: DISCONTINUED | OUTPATIENT
Start: 2022-07-27 | End: 2022-07-27

## 2022-07-27 RX ORDER — DIPHENHYDRAMINE HYDROCHLORIDE 50 MG/ML
50 INJECTION INTRAMUSCULAR; INTRAVENOUS
Status: COMPLETED | OUTPATIENT
Start: 2022-07-27 | End: 2022-07-27

## 2022-07-27 RX ORDER — IBUPROFEN 200 MG
24 TABLET ORAL
Status: DISCONTINUED | OUTPATIENT
Start: 2022-07-27 | End: 2022-07-28 | Stop reason: HOSPADM

## 2022-07-27 RX ORDER — GABAPENTIN 300 MG/1
600 CAPSULE ORAL 2 TIMES DAILY
Status: DISCONTINUED | OUTPATIENT
Start: 2022-07-27 | End: 2022-07-28 | Stop reason: HOSPADM

## 2022-07-27 RX ORDER — OXYCODONE HYDROCHLORIDE 5 MG/1
5 TABLET ORAL EVERY 6 HOURS PRN
Status: DISCONTINUED | OUTPATIENT
Start: 2022-07-27 | End: 2022-07-28 | Stop reason: HOSPADM

## 2022-07-27 RX ORDER — CARVEDILOL 6.25 MG/1
6.25 TABLET ORAL 2 TIMES DAILY
Status: DISCONTINUED | OUTPATIENT
Start: 2022-07-27 | End: 2022-07-28 | Stop reason: HOSPADM

## 2022-07-27 RX ORDER — HYDRALAZINE HYDROCHLORIDE 20 MG/ML
10 INJECTION INTRAMUSCULAR; INTRAVENOUS EVERY 6 HOURS PRN
Status: DISCONTINUED | OUTPATIENT
Start: 2022-07-27 | End: 2022-07-28 | Stop reason: HOSPADM

## 2022-07-27 RX ORDER — POLYETHYLENE GLYCOL 3350 17 G/17G
17 POWDER, FOR SOLUTION ORAL DAILY
Status: DISCONTINUED | OUTPATIENT
Start: 2022-07-27 | End: 2022-07-28 | Stop reason: HOSPADM

## 2022-07-27 RX ORDER — FAMOTIDINE 10 MG/ML
20 INJECTION INTRAVENOUS
Status: COMPLETED | OUTPATIENT
Start: 2022-07-27 | End: 2022-07-27

## 2022-07-27 RX ORDER — SODIUM CHLORIDE 9 MG/ML
50 INJECTION, SOLUTION INTRAVENOUS ONCE
Status: COMPLETED | OUTPATIENT
Start: 2022-07-27 | End: 2022-07-27

## 2022-07-27 RX ORDER — LABETALOL HCL 20 MG/4 ML
10 SYRINGE (ML) INTRAVENOUS
Status: DISCONTINUED | OUTPATIENT
Start: 2022-07-27 | End: 2022-07-28 | Stop reason: HOSPADM

## 2022-07-27 RX ORDER — HEPARIN SODIUM 5000 [USP'U]/ML
5000 INJECTION, SOLUTION INTRAVENOUS; SUBCUTANEOUS EVERY 8 HOURS
Status: DISCONTINUED | OUTPATIENT
Start: 2022-07-28 | End: 2022-07-28 | Stop reason: HOSPADM

## 2022-07-27 RX ORDER — EPINEPHRINE 0.3 MG/.3ML
0.3 INJECTION SUBCUTANEOUS
Status: DISCONTINUED | OUTPATIENT
Start: 2022-07-27 | End: 2022-07-27

## 2022-07-27 RX ORDER — INSULIN ASPART 100 [IU]/ML
1-10 INJECTION, SOLUTION INTRAVENOUS; SUBCUTANEOUS
Status: DISCONTINUED | OUTPATIENT
Start: 2022-07-27 | End: 2022-07-28 | Stop reason: HOSPADM

## 2022-07-27 RX ORDER — GLUCAGON 1 MG
1 KIT INJECTION
Status: DISCONTINUED | OUTPATIENT
Start: 2022-07-27 | End: 2022-07-28 | Stop reason: HOSPADM

## 2022-07-27 RX ORDER — METHYLPREDNISOLONE SOD SUCC 125 MG
125 VIAL (EA) INJECTION
Status: COMPLETED | OUTPATIENT
Start: 2022-07-27 | End: 2022-07-27

## 2022-07-27 RX ADMIN — DIPHENHYDRAMINE HYDROCHLORIDE 50 MG: 50 INJECTION, SOLUTION INTRAMUSCULAR; INTRAVENOUS at 03:07

## 2022-07-27 RX ADMIN — CARVEDILOL 6.25 MG: 6.25 TABLET, FILM COATED ORAL at 08:07

## 2022-07-27 RX ADMIN — FAMOTIDINE 20 MG: 10 INJECTION INTRAVENOUS at 03:07

## 2022-07-27 RX ADMIN — ONDANSETRON 4 MG: 2 INJECTION INTRAMUSCULAR; INTRAVENOUS at 03:07

## 2022-07-27 RX ADMIN — IOHEXOL 100 ML: 350 INJECTION, SOLUTION INTRAVENOUS at 12:07

## 2022-07-27 RX ADMIN — FENTANYL CITRATE 25 MCG: 50 INJECTION INTRAMUSCULAR; INTRAVENOUS at 12:07

## 2022-07-27 RX ADMIN — SODIUM CHLORIDE 50 ML: 0.9 INJECTION, SOLUTION INTRAVENOUS at 02:07

## 2022-07-27 RX ADMIN — SACUBITRIL AND VALSARTAN 1 TABLET: 24; 26 TABLET, FILM COATED ORAL at 08:07

## 2022-07-27 RX ADMIN — OXYCODONE 5 MG: 5 TABLET ORAL at 07:07

## 2022-07-27 RX ADMIN — METHYLPREDNISOLONE SODIUM SUCCINATE 125 MG: 125 INJECTION, POWDER, FOR SOLUTION INTRAMUSCULAR; INTRAVENOUS at 03:07

## 2022-07-27 RX ADMIN — GABAPENTIN 600 MG: 300 CAPSULE ORAL at 08:07

## 2022-07-27 RX ADMIN — ACETAMINOPHEN 1000 MG: 500 TABLET ORAL at 03:07

## 2022-07-27 RX ADMIN — ACETAMINOPHEN 650 MG: 325 TABLET ORAL at 07:07

## 2022-07-27 RX ADMIN — HUMAN ALBUMIN MICROSPHERES AND PERFLUTREN 0.66 MG: 10; .22 INJECTION, SOLUTION INTRAVENOUS at 09:07

## 2022-07-27 RX ADMIN — ALTEPLASE 69.8 MG: KIT at 01:07

## 2022-07-27 RX ADMIN — ATORVASTATIN CALCIUM 40 MG: 40 TABLET, FILM COATED ORAL at 08:07

## 2022-07-27 NOTE — ED PROVIDER NOTES
"Encounter Date: 7/27/2022       History     Chief Complaint   Patient presents with    Cerebrovascular Accident     Pt presents with L sided numbness and weakness starting 2 hours ago. Pt presents with expressive aphasia, unable to follow commands. Pt is aware of what is going on but is unable to perform tasks.      Ms. Verma is a 46 year old female, PMH significant for DM2, CHF with and EF of 25%, HTN, presenting to the ED for acute onset left sided weakness. She arrived in the ED at 0020 (7/27/22) and symptoms began at 2200 (7/26/22) per patient's family. Patient reports that she felt her heart beating really fast and that her head hurt. Per patient's  and son, patient told her son to help her because she was not feeling well and patient "slumped" into her son's arms. Patient was unable to speak and unable to move the left side of her body. Patient's family put her in their truck and transported her here from [place]. At the ED, patient unable to speak but appeared afraid and in distress. She had to be lifted onto the CT scanner and was not able to support herself while in the wheelchair. Patient was able to respond to her name but closed her eyes and did not respond to sternal rub 2x. Patient was able to communicate that she had a severe headache that started shortly after she felt palpitations. Headache located on patient's left temple, stretching to the left occipital area.        Review of patient's allergies indicates:   Allergen Reactions    Lisinopril Rash     Past Medical History:   Diagnosis Date    Anticoagulant long-term use     CHF (congestive heart failure)     Chronic systolic congestive heart failure 3/7/2017    Diabetes mellitus     Diabetes mellitus type 2 in obese 1/6/2016    Hypertension     Primary hypertension 1/6/2016     Past Surgical History:   Procedure Laterality Date    BREAST SURGERY Bilateral     reduction    CARDIAC DEFIBRILLATOR PLACEMENT  2018    INSERTION OF " PACEMAKER  2018    LAPAROSCOPIC CHOLECYSTECTOMY N/A 6/8/2022    Procedure: CHOLECYSTECTOMY, LAPAROSCOPIC;  Surgeon: Sonido Kennedy MD;  Location: [facility];  Service: General;  Laterality: N/A;    TUBAL LIGATION       Family History   Problem Relation Age of Onset    Heart disease Mother     Arthritis Mother     Heart disease Father     Hypertension Father     Asthma Son     Heart disease Paternal Grandmother     Pulmonary embolism Unknown     Glaucoma Neg Hx     Macular degeneration Neg Hx     Retinal detachment Neg Hx      Social History     Tobacco Use    Smoking status: Never Smoker    Smokeless tobacco: Never Used   Substance Use Topics    Alcohol use: No    Drug use: No     Review of Systems   Constitutional: Negative for chills and fever.   HENT: Negative for ear pain, rhinorrhea and sore throat.    Respiratory: Negative for chest tightness and shortness of breath.    Cardiovascular: Positive for palpitations. Negative for chest pain.   Gastrointestinal: Negative for abdominal distention, abdominal pain, constipation, diarrhea, nausea and vomiting.   Endocrine: Negative for polyuria.   Genitourinary: Negative for dysuria and hematuria.   Musculoskeletal: Negative for arthralgias.   Neurological: Positive for speech difficulty, weakness and headaches.       Physical Exam     Initial Vitals   BP Pulse Resp Temp SpO2   07/27/22 0013 07/27/22 0013 07/27/22 0013 07/27/22 0023 07/27/22 0013   (!) 165/82 75 20 98.8 °F (37.1 °C) 98 %      MAP       --                Physical Exam    Constitutional: She appears well-developed and well-nourished. No distress.   HENT:   Head: Normocephalic and atraumatic.   Eyes: EOM are normal. Pupils are equal, round, and reactive to light.   Cardiovascular: Normal rate, regular rhythm and normal heart sounds. Exam reveals no gallop and no friction rub.    No murmur heard.  Pulmonary/Chest: Breath sounds normal. No respiratory distress. She has no wheezes. She has no  rhonchi. She has no rales.   Abdominal: Abdomen is soft. Bowel sounds are normal.     Neurological: She is alert. No cranial nerve deficit. GCS eye subscore is 4. GCS verbal subscore is 5. GCS motor subscore is 6.   Left sided weakness of arm and leg.  Pronator drift present.   Skin: Skin is warm and dry. Capillary refill takes less than 2 seconds.         ED Course   Procedures  Labs Reviewed   CBC W/ AUTO DIFFERENTIAL - Abnormal; Notable for the following components:       Result Value    WBC 13.59 (*)     Hemoglobin 11.1 (*)     Hematocrit 34.7 (*)     MCH 26.9 (*)     Gran # (ANC) 9.0 (*)     All other components within normal limits   COMPREHENSIVE METABOLIC PANEL - Abnormal; Notable for the following components:    Potassium 3.1 (*)     CO2 22 (*)     Glucose 133 (*)     All other components within normal limits   LIPID PANEL - Abnormal; Notable for the following components:    Triglycerides 438 (*)     HDL 37 (*)     All other components within normal limits   URINALYSIS, REFLEX TO URINE CULTURE - Abnormal; Notable for the following components:    Appearance, UA Hazy (*)     Protein, UA 1+ (*)     Leukocytes, UA 3+ (*)     All other components within normal limits    Narrative:     Specimen Source->Urine   PHOSPHORUS - Abnormal; Notable for the following components:    Phosphorus 5.7 (*)     All other components within normal limits   LIPID PANEL - Abnormal; Notable for the following components:    Triglycerides 387 (*)     HDL 35 (*)     HDL/Cholesterol Ratio 19.1 (*)     Total Cholesterol/HDL Ratio 5.2 (*)     All other components within normal limits   URINALYSIS MICROSCOPIC - Abnormal; Notable for the following components:    RBC, UA 15 (*)     WBC, UA 88 (*)     Bacteria Moderate (*)     All other components within normal limits    Narrative:     Specimen Source->Urine   ISTAT PROCEDURE - Abnormal; Notable for the following components:    POC TCO2 (MEASURED) 30 (*)     All other components within normal  limits   POCT GLUCOSE - Abnormal; Notable for the following components:    POCT Glucose 243 (*)     All other components within normal limits   CULTURE, URINE   PROTIME-INR   TSH   MAGNESIUM   TSH   HEMOGLOBIN A1C   POCT GLUCOSE, HAND-HELD DEVICE   TYPE & SCREEN   POCT GLUCOSE MONITORING CONTINUOUS   POCT GLUCOSE MONITORING CONTINUOUS        ECG Results          ECG 12 lead (Final result)  Result time 07/27/22 12:37:40    Final result by Interface, Lab In [facility] (07/27/22 12:37:40)                 Narrative:    Test Reason : I63.9,    Vent. Rate : 073 BPM     Atrial Rate : 073 BPM     P-R Int : 176 ms          QRS Dur : 140 ms      QT Int : 440 ms       P-R-T Axes : 049 153 -76 degrees     QTc Int : 484 ms    Normal sinus rhythm  Left bundle branch block  Right axis deviation  Abnormal ECG  When compared with ECG of 15-MAR-2022 20:06,  No significant change was found  Confirmed by Randall Tong MD ([id number]) on 7/27/2022 12:37:33 PM    Referred By: [email or web address]   SELF           Confirmed By:Randall Tong MD                            Imaging Results          X-Ray Chest AP Single View (Final result)  Result time 07/27/22 03:04:19    Final result by Derrick Lewis MD (07/27/22 03:04:19)                 Impression:      Cardiomegaly with AICD.    No significant change from prior study.      Electronically signed by: Derrick Lewis MD  Date:    07/27/2022  Time:    03:04             Narrative:    EXAMINATION:  XR CHEST 1 VIEW    CLINICAL HISTORY:  admission/CVA;    TECHNIQUE:  Single frontal view of the chest was performed.    COMPARISON:  03/15/2022.    FINDINGS:  AICD leads overlie the heart, similar to prior.    Stable cardiomegaly.    Heart and lungs  appear unchanged when allowing for differences in technique and positioning.                               CTA STROKE MULTI-PHASE (Final result)  Result time 07/27/22 01:28:01    Final result by Derrick Lewis MD (07/27/22 01:28:01)                 Impression:       No acute abnormality. No high-grade stenosis or major vessel occlusion.    Bilateral thyroid nodules measuring up to 1.0 cm.  Consider nonemergent thyroid ultrasound for further evaluation when clinically appropriate.    Electronically signed by resident: Jenny Tam  Date:    07/27/2022  Time:    01:01    Electronically signed by: Derrick Lewis MD  Date:    07/27/2022  Time:    01:28             Narrative:    EXAMINATION:  CTA STROKE MULTI-PHASE    CLINICAL HISTORY:  Neuro deficit, acute, stroke suspected;    TECHNIQUE:  Non contrast low dose axial images were obtained thought the head. CT angiogram was performed from the level of the lisa to the top of the head following the IV administration of 100mL of Omnipaque 350.   Sagittal and coronal reconstructions and maximum intensity projection reconstructions were performed. Arterial stenosis percentages are based on NASCET measurement criteria.  Two additional phases of immediate post-contrast CTA images were performed through the head alone.    COMPARISON:  CT head without contrast 03/16/2022 and CTA stroke multiphase 03/15/2022.    FINDINGS:  Intracranial Compartment:    Ventricles and sulci are normal in size for age without evidence of hydrocephalus. No extra-axial blood or fluid collections.    The brain parenchyma appears normal. No parenchymal mass, hemorrhage, edema, or major vascular distribution infarct.    Skull/Extracranial Contents (limited evaluation): No fracture. Mastoid air cells and paranasal sinuses are essentially clear.    Non-Vascular Structures of the Neck/Thoracic Inlet (limited evaluation): Right thyroid nodule measuring 1.0 cm.  Left thyroid nodule measuring 0.8 cm.  Bilateral parotid and submandibular glands are unremarkable.  Motion artifact limiting assessment of the visualized portions of the lungs.    Aorta: Normal 3 vessel arch.    Extracranial carotid circulation: No hemodynamically significant stenosis, aneurysmal dilatation,  or dissection of the bilateral common carotid arteries or bilateral internal carotid arteries.    Extracranial vertebral circulation: Mild asymmetry of the vertebral arteries with slight left vertebral artery dominance, likely congenital.    Intracranial Arteries: Mild calcific atherosclerosis of the bilateral internal carotid arteries but significant stenosis.  Bilateral MCAs, ACAs, and PCAs are patent.    Venous structures (limited evaluation): Normal.                                 Medications   sodium chloride 0.9% flush 10 mL (has no administration in time range)   polyethylene glycol packet 17 g (17 g Oral Not Given 7/27/22 0900)   ondansetron injection 4 mg (4 mg Intravenous Given 7/27/22 1506)   atorvastatin tablet 40 mg (40 mg Oral Given 7/27/22 0813)   labetalol 20 mg/4 mL (5 mg/mL) IV syring (has no administration in time range)   hydrALAZINE injection 10 mg (has no administration in time range)   glucose chewable tablet 16 g (has no administration in time range)   glucose chewable tablet 24 g (has no administration in time range)   dextrose 10% bolus 125 mL (has no administration in time range)   dextrose 10% bolus 250 mL (has no administration in time range)   glucagon (human recombinant) injection 1 mg (has no administration in time range)   insulin aspart U-100 pen 1-10 Units (has no administration in time range)   carvediloL tablet 6.25 mg (6.25 mg Oral Given 7/27/22 2055)   gabapentin capsule 600 mg (600 mg Oral Given 7/27/22 2055)   acetaminophen tablet 1,000 mg (1,000 mg Oral Given 7/27/22 1506)   sacubitriL-valsartan 24-26 mg per tablet 1 tablet (1 tablet Oral Given 7/27/22 2055)   oxyCODONE immediate release tablet 5 mg (5 mg Oral Given 7/27/22 1938)   iohexoL (OMNIPAQUE 350) injection 100 mL (100 mLs Intravenous Given 7/27/22 0022)   ALTEPLASE IV BOLUS FROM VIAL 7.8 mg (0 mg/kg × 86.2 kg Intravenous Stopped 7/27/22 0118)   alteplase (ACtivase) injection 69.8 mg (0 mg/kg × 86.2 kg Intravenous  Stopped 7/27/22 0218)   0.9%  NaCl infusion (0 mLs Intravenous Stopped 7/27/22 0255)   diphenhydrAMINE injection 50 mg (50 mg Intravenous Given 7/27/22 0317)   methylPREDNISolone sodium succinate injection 125 mg (125 mg Intravenous Given 7/27/22 0317)   famotidine (PF) injection 20 mg (20 mg Intravenous Given 7/27/22 0317)   acetaminophen tablet 1,000 mg (1,000 mg Oral Given 7/27/22 0330)   fentaNYL 50 mcg/mL injection 25 mcg (25 mcg Intravenous Given 7/27/22 1205)   perflutren protein-A microsphr 0.22 mg/mL IV susp (0.66 mg Intravenous Given by Other 7/27/22 0930)     Medical Decision Making:   Initial Assessment:   Ms. Verma is a 46 year old female, PMH significant for DM2, CHF with and EF of 25%, HTN, presenting to the ED for acute onset left sided weakness. Upon initial exam, Ms Verma was in acute distress and very ill. She had left sided weakness, a severe left sided headache, and aphasia.  Differential Diagnosis:   Ischemic stroke  Hemorrhagic stroke  hypoglycemia  Clinical Tests:   Lab Tests: Ordered and Reviewed  The following lab test(s) were unremarkable: CBC, CMP and PT  ED Management:  History and physical exam highly suggestive of acute stroke. Code stroke was alerted in the ED and patient was take for CTA. CTA showed no evidence of hemorrhage. Vascular surgery at bedside for CT scan. Stroke order set was ordered. Istat came back with a glucose of 91, making hypoglycemia a less likely diagnosis for stroke. Alteplase 7.8mg IV was administered at 0117. Patient's symptoms significantly improved after administration of alteplase. Patient was able to move the left side of her body, she was able to talk, and her headache improved significantly. CBC and CMP were significant for hyperglycemia and a leukocytosis, likely representative of patient's stress response from the stroke.    ~0300, patient started to feel like her throat was swelling, she felt short of breath, felt itchy on her thighs, and was having  increased difficulty speaking.. Additionally, patient stated that her headache was coming back. At her first presentation, her headache was >10/10, and it was now a 5/10. Patient's symptoms suggestive of a allergic reaction. Diphenhydramine, famotidine, and methylprednisolone was administered to address the potential allergic reaction. Epinephrine and TXA was at bedside in case patient began having an anaphylactic reaction. Acetaminophen was given to address headache. Patient felt some relief from these symptoms.    UA returned significant for a UTI, patient will need treatment with oral antibiotics. Hyperphosphatemia is insignificant in the setting of a normal Ca, will likely resolve in the next day.    Patient admitted to neuro-ICU for further management.    Signout given to Dr. Anish Gaona, patient pending transfer to neuro-ICU.            Attending Attestation:   Physician Attestation Statement for Resident:  As the supervising MD   Physician Attestation Statement: I have personally seen and examined this patient.   I agree with the above history. -:   As the supervising MD I agree with the above PE.    As the supervising MD I agree with the above treatment, course, plan, and disposition.   -:   46-year-old female presenting to emergency department with aphasia and left-sided weakness.  Last known normal 10:00 p.m..  Stroke code activated.  CTA without hemorrhage or obvious large vessel occlusion.  Given her elevated NIHSS, lack of toxidrome, normal blood glucose, decision was made to give tPA.  Following tPA administration, patient had significant improvement of her symptoms.  Now able to speak, and moved the left side.  She was admitted to neuro ICU.  While boarding in the emergency department, she developed a sensation of throat closing. She was phonating appropriately, moving ear probe really, without increased work of breathing, or obvious face tongue or lip swelling.  She had no rash, no nausea or  vomiting, and no wheezing.  She received steroids, Benadryl, and famotidine.  Given her lack of progression of symptoms after administration these medications, I did not administer TXA or epi.  Neuro ICU was updated regarding the event.  I have reviewed and agree with the residents interpretation of the following: CT scans and lab data.  I have reviewed the following: old records at this facility.        Attending Critical Care:   Critical Care Times:   Direct Patient Care (initial evaluation, reassessments, and time considering the case)................................................................60 minutes.   Additional History from reviewing old medical records or taking additional history from the family, EMS, PCP, etc.......................5 minutes.   Ordering, Reviewing, and Interpreting Diagnostic Studies...............................................................................................................5 minutes.   Documentation..................................................................................................................................................................................5 minutes.   Consultation with other Physicians. .................................................................................................................................................8 minutes.   ==============================================================  · Total Critical Care Time - exclusive of procedural time: 83 minutes.  ==============================================================  Critical care was necessary to treat or prevent imminent or life-threatening deterioration of the following conditions: stroke.   Critical care was time spent personally by me on the following activities: obtaining history from patient or relative, examination of patient, review of old charts, ordering lab, x-rays, and/or EKG, development of treatment plan with patient or relative, evaluation of  patient's response to treatment, discussions with primary provider, discussion with consultants, re-evaluation of patient's conition and ordering and performing treatments and interventions.   Critical Care Condition: potentially life-threatening                    Clinical Impression:   Final diagnoses:  [I63.9] Stroke          ED Disposition Condition    Admit               Jenny Brown MD  Resident  07/27/22 1211       Cande Rea MD  07/27/22 0125

## 2022-07-27 NOTE — ASSESSMENT & PLAN NOTE
-Echo as below  -EKG pending  -AICD in place, not MRI compatible  -continue home coreg  -restart home entresto as appropriate  -repeat Echo pending     Echo - March 2022    Interpretation Summary  · The left ventricle is normal in size with severely decreased systolic function. The estimated ejection fraction is 25%.  · There is severe left ventricular global hypokinesis.  · Normal right ventricular size with normal right ventricular systolic function.  · Left ventricular diastolic dysfunction.  · Mild left atrial enlargement.  · Normal central venous pressure (3 mmHg).

## 2022-07-27 NOTE — ASSESSMENT & PLAN NOTE
-Right MCA CVA s/p tPA  -Intial NIH 16 per chart review. NIH on admission 6 at end of tpa infusion.   -Initial CTH w/o mass, hemorrhage, or obvious edema. Unable to have MRI due to AICD incompatibility   -Admit to [facility]  -Vascular Neurology consulted, appreciate recs  -Hourly neuro checks and vital signs  -EKG, Echo, and CXR pending  -A1c of 9.5% 1 month ago   -TSH and lipid panel pending  -aPTT, PT/INR WNL   -CBC, CMP, Mag, and phos daily  -SBP goal < 180  -PRN labetalol and hydralazine  -Statin   -SCD; hold DVT ppx in tpa window  -CTA on admission w/o LVO   -Repeat CTH ordered for 2000 7/27   -PT/OT/SLP

## 2022-07-27 NOTE — H&P
"[facility] Dept  Neurocritical Care  History & Physical    Admit Date: 7/27/2022  Service Date: 07/27/2022  Length of Stay: 0    Subjective:     Chief Complaint: Stroke due to occlusion of right middle cerebral artery    History of Present Illness: Ms. Verma is a pleasant 45 y/o f w/ pmhx  of CHF (EF 25%), AICD (placed 2017- not MRI compatible), DM2 (on insulin at home), HTN, Back and R leg pain (seeing acute pain management for thoracic radiculopathy) who presents to [facility] ED for sudden onset LSW and slurred speech. The event began acutely while talking with her son around midnight, she reports she started feeling "funny" her heart started racing and the developed LSW and had difficulty forming words. Upon presentation to ED, patient had NIH of 16, CTH showed no acute hemorrhage, mass, or edema, and CTA revealed no LVO. She was not able to undergo MRI due to her AICD and tpa was administered (stop time 0218 7/27).     Patient to be admitted to [facility] for neuro-monitoring and a higher level of care while in tpa window.     Of note: patient reports her defibrillator has fired once 1 year ago, but not recently. She does endorse a fluttery feeling in her chest and itchy skin when she becomes anxious.         Past Medical History:   Diagnosis Date    Anticoagulant long-term use     CHF (congestive heart failure)     Chronic systolic congestive heart failure 3/7/2017    Diabetes mellitus     Diabetes mellitus type 2 in obese 1/6/2016    Hypertension     Primary hypertension 1/6/2016     Past Surgical History:   Procedure Laterality Date    BREAST SURGERY Bilateral     reduction    CARDIAC DEFIBRILLATOR PLACEMENT  2018    INSERTION OF PACEMAKER  2018    LAPAROSCOPIC CHOLECYSTECTOMY N/A 6/8/2022    Procedure: CHOLECYSTECTOMY, LAPAROSCOPIC;  Surgeon: Sonido Kennedy MD;  Location: [facility];  Service: General;  Laterality: N/A;    TUBAL LIGATION        No current facility-administered medications on file prior " to encounter.     Current Outpatient Medications on File Prior to Encounter   Medication Sig Dispense Refill    aspirin (ECOTRIN) 81 MG EC tablet Take 1 tablet (81 mg total) by mouth once daily. 30 tablet 11    atorvastatin (LIPITOR) 40 MG tablet Take 1 tablet (40 mg total) by mouth once daily. 90 tablet 3    betamethasone dipropionate 0.05 % cream Apply topically 2 (two) times daily. 45 g 0    blood sugar diagnostic Strp Use to test blood glucose 4 (four) times daily before meals and nightly. 200 each 11    blood-glucose meter (ACCU-CHEK GUIDE GLUCOSE METER) Misc Use to test blood glucose 4 (four) times daily before meals and nightly. 1 each 0    blood-glucose meter,continuous (DEXCOM ) Misc Use to check blood glucose at least 4 times a day 1 each 0    blood-glucose sensor (DEXCOM G5-G4 SENSOR) Bhumika Apply 1 sensor every 10 days 9 each 3    blood-glucose transmitter (DEXCOM G5 TRANSMITTER) Bhumika Replace every 90 days 1 each 3    carvediloL (COREG) 6.25 MG tablet Take 1 tablet (6.25 mg total) by mouth 2 (two) times daily. 60 tablet 11    famotidine (PEPCID) 20 MG tablet Take 1 tablet (20 mg total) by mouth 2 (two) times daily. for 5 days 10 tablet 0    furosemide (LASIX) 40 MG tablet Take 1 tablet (40 mg total) by mouth 2 (two) times daily. (Patient taking differently: Take 40 mg by mouth once daily. Takes once daily in morning) 30 tablet 1    gabapentin (NEURONTIN) 300 MG capsule Take 2 capsules (600 mg total) by mouth 2 (two) times daily. 120 capsule 3    HYDROcodone-acetaminophen (NORCO) 7.5-325 mg per tablet Take 1 tablet by mouth every 6 (six) hours as needed for Pain. 30 tablet 0    insulin aspart U-100 (NOVOLOG FLEXPEN U-100 INSULIN) 100 unit/mL (3 mL) InPn pen Inject 15 Units into the skin 4 (four) times daily with meals and nightly. Plus sliding scale, max of 90 units per day 81 mL 1    insulin aspart U-100 (NOVOLOG FLEXPEN U-100 INSULIN) 100 unit/mL (3 mL) InPn pen 15 units with meals  "plus ss, or max daily dose of 100 units. 30 mL 6    insulin degludec (TRESIBA FLEXTOUCH U-200) 200 unit/mL (3 mL) insulin pen Inject 100 Units into the skin every evening. 5 pen 6    insulin syringe-needle U-100 (BD INSULIN SYRINGE ULTRA-FINE) 1 mL 31 gauge x 5/16 Syrg Use to inject insulin 5 times daily 100 each 11    lancets (ACCU-CHEK SOFTCLIX LANCETS) Misc Use to check blood glucose 4 (four) times daily before meals and nightly. 200 each 5    methocarbamoL (ROBAXIN) 500 MG Tab Take 1 tablet (500 mg total) by mouth 3 (three) times daily as needed (spasm). 90 tablet 0    nitroGLYCERIN (NITROSTAT) 0.4 MG SL tablet Place 1 tablet (0.4 mg total) under the tongue every 5 (five) minutes as needed for Chest pain. 25 tablet 0    ondansetron (ZOFRAN) 4 MG tablet Take 1 tablet (4 mg total) by mouth every 6 (six) hours as needed for Nausea. 20 tablet 0    pen needle, diabetic 31 gauge x 5/16" Ndle Use to inject insulin 4 times daily 300 each 3    sacubitriL-valsartan (ENTRESTO) 24-26 mg per tablet Take 1 tablet by mouth 2 (two) times daily. 180 tablet 3    [DISCONTINUED] albuterol (PROVENTIL/VENTOLIN HFA) 90 mcg/actuation inhaler Inhale 2 puffs into the lungs every 6 (six) hours as needed. Rescue 18 g 0    [DISCONTINUED] azelastine (ASTELIN) 137 mcg (0.1 %) nasal spray 1 spray (137 mcg total) by Nasal route 2 (two) times daily. 30 mL 0    [DISCONTINUED] clonazePAM (KLONOPIN) 0.5 MG tablet Take 0.5 mg by mouth 2 (two) times daily as needed for Anxiety.        Allergies: Lisinopril    Family History   Problem Relation Age of Onset    Heart disease Mother     Arthritis Mother     Heart disease Father     Hypertension Father     Asthma Son     Heart disease Paternal Grandmother     Pulmonary embolism Unknown     Glaucoma Neg Hx     Macular degeneration Neg Hx     Retinal detachment Neg Hx      Social History     Tobacco Use    Smoking status: Never Smoker    Smokeless tobacco: Never Used   Substance Use " Topics    Alcohol use: No    Drug use: No     Review of Systems   Constitutional:  Negative for chills and fever.   HENT:  Negative for sore throat and trouble swallowing.    Eyes:  Negative for photophobia and visual disturbance.   Respiratory:  Negative for cough and shortness of breath.    Cardiovascular:  Negative for chest pain, palpitations and leg swelling.   Gastrointestinal:  Negative for abdominal pain, nausea and vomiting.   Genitourinary:  Negative for difficulty urinating.   Musculoskeletal:  Negative for arthralgias, back pain and joint swelling.   Skin:  Negative for color change, pallor and rash.   Neurological:  Positive for facial asymmetry (L, resolved), speech difficulty (Improving drastically since onset.), weakness (LSW improving), numbness (LUE, LLE. Improving.) and headaches (global, improving). Negative for dizziness, tremors, seizures, syncope and light-headedness.   Psychiatric/Behavioral:  Negative for agitation and confusion.    Objective:     Vitals:    Temp: 98.8 °F (37.1 °C)  Pulse: 65  BP: (!) 160/78  MAP (mmHg): 112  Resp: 16  SpO2: (!) 94 %  O2 Device (Oxygen Therapy): room air    Temp  Min: 98.8 °F (37.1 °C)  Max: 98.8 °F (37.1 °C)  Pulse  Min: 62  Max: 75  BP  Min: 131/62  Max: 175/74  MAP (mmHg)  Min: 89  Max: 112  Resp  Min: 15  Max: 20  SpO2  Min: 93 %  Max: 98 %    No intake/output data recorded.         Physical Exam - At end of tpa infusion     Constitutional: Well-nourished, well-developed. Appears slightly older than stated age. Patient becomes appropriately tearful during exam.      Eyes: Clear conjunctiva. Anicteric. No discharge.   Lids without lesions.    HEENT: Normocephalic, atraumatic.   Nose and external ears atraumatic.   Mucus membranes are moist.     Cardio: Regular rate and rhythm. Wide complexes noted intermittently on monitor at bedside in ED.   Pulses intact in bilateral upper and lower extremities.   Capillary refill time < 2 seconds.    Respiratory:  Regular effort, no respiratory distress.  Clear to auscultation throughout all lung fields.     GI: Bowel sounds present in all 4 quadrants.   Soft, non-distended, non-tender.  No appreciable hepatosplenomegaly.     Skin: No erythema or rash to exposed skin. Excoriations and minor scabs over lower both sides of abdomen.     Neuro:E4 V5 M6    Awake, alert, and oriented to self, time, place, and situation. Patient is answering all questions appropriately and following all commands briskly.   No facial droop. EOMI. PERRL. Peripheral vision intact bilaterally.     Motor:   RUE: 5/5  LUE: 3/5  RLE: 5/5  LLE: 4/5    Sensory:  Sensation intact on right side, diminished sensation to rough touch on LUE and LLE.      07/27/22 0202   NIH Stroke Scale   1a. Level of Consciousness 0-->Alert, keenly responsive   1b. LOC Questions 0-->Answers both questions correctly   1c. LOC Commands 0-->Performs both tasks correctly   2. Best Gaze 0-->Normal   3. Visual 0-->No visual loss   4. Facial Palsy 0-->Normal symmetrical movements   5a. Motor Arm, Left 2-->Some effort against gravity, limb cannot get to or maintain (if cued) 90 (or 45) degrees, drifts down to bed, but has some effort against gravity   5b. Motor Arm, Right 0-->No drift, limb holds 90 (or 45) degrees for full 10 secs   6a. Motor Leg, Left 1-->Drift, leg falls by the end of the 5-sec period but does not hit bed   6b. Motor Leg, Right 0-->No drift, leg holds 30 degree position for full 5 secs   7. Limb Ataxia 0-->Absent   8. Sensory 1-->Mild-to-moderate sensory loss, patient feels pinprick is less sharp or is dull on the affected side, or there is a loss of superficial pain with pinprick, but patient is aware of being touched   9. Best Language 0-->No aphasia, normal   10. Dysarthria 1-->Mild-to-moderate dysarthria, patient slurs at least some words and, at worst, can be understood with some difficulty   11. Extinction and Inattention (formerly Neglect) 1-->Visual, tactile,  auditory, spatial, or personal inattention or extinction to bilateral simultaneous stimulation in one of the sensory modalities   Total (NIH Stroke Scale) 6     Today I personally reviewed pertinent medications, lines/drains/airways, imaging, cardiology results, laboratory results, notably: CTH, EKG, Medications    Assessment/Plan:     Neuro  * Stroke due to occlusion of right middle cerebral artery  -Right MCA CVA s/p tPA  -Intial NIH 16 per chart review. NIH on admission 6 at end of tpa infusion.   -Initial CTH w/o mass, hemorrhage, or obvious edema. Unable to have MRI due to AICD incompatibility   -Admit to [facility]  -Vascular Neurology consulted, appreciate recs  -Hourly neuro checks and vital signs  -EKG, Echo, and CXR pending  -A1c of 9.5% 1 month ago   -TSH and lipid panel pending  -aPTT, PT/INR WNL   -CBC, CMP, Mag, and phos daily  -SBP goal < 180  -PRN labetalol and hydralazine  -Statin   -SCD; hold DVT ppx in tpa window  -CTA on admission w/o LVO   -Repeat CTH ordered for 2000 7/27   -PT/OT/SLP    Cardiac/Vascular  Mixed hyperlipidemia  -lipid panel pending  -continue home statin     Chronic systolic congestive heart failure  -Echo as below  -EKG pending  -AICD in place, not MRI compatible  -continue home coreg  -restart home entresto as appropriate  -repeat Echo pending     Echo - March 2022    Interpretation Summary  · The left ventricle is normal in size with severely decreased systolic function. The estimated ejection fraction is 25%.  · There is severe left ventricular global hypokinesis.  · Normal right ventricular size with normal right ventricular systolic function.  · Left ventricular diastolic dysfunction.  · Mild left atrial enlargement.  · Normal central venous pressure (3 mmHg).      Primary hypertension  -SBP goal <180  -PRN labetalol, hydralazine  -continue home coreg     Endocrine  Diabetes mellitus type 2 in obese  -Hemoglobin A1c 9.5% in June  -POCt glucose 100 on admission   -SSI w/ BG checks  ACHS   -continue home gabapentin     Puriritis  -1 hour after tpa infusion patient reported she had increasing puritis, and retropharyngeal discomfort w/ mild difficulty swallowing  -Benadryl and methylprednisolone given with no change in symptoms  -Patient not SOB, no increased work of breathing and satting 97-99% on RA. Handling oral secretions. No hypotension.  -Decision made w/ attending and patient to not reverse tpa       The patient is being Prophylaxed for:  Venous Thromboembolism with: Mechanical  Stress Ulcer with: Not Applicable   Ventilator Pneumonia with: not applicable    Activity Orders          Turn patient starting at 07/27 0400    Elevate HOB starting at 07/27 0205    Diet NPO: NPO starting at 07/27 0205        Full Code     Critical condition in that Patient has a condition that poses threat to life and bodily function: R MCA CVA s/p tpa, TPA window, HTN, HFrEF w/ AICD, Diabetes, HLD     55 minutes of Critical care time was spent personally by me on the following activities: development of treatment plan with patient or surrogate and bedside caregivers, discussions with consultants, evaluation of patient's response to treatment, examination of patient, ordering and performing treatments and interventions, ordering and review of laboratory studies, ordering and review of radiographic studies, pulse oximetry, antibiotic titration if applicable, vasopressor titration if applicable, re-evaluation of patient's condition. This critical care time did not overlap with that of any other provider or involve time for any procedures. There is high probability for acute neurological change leading to clinical and possibly life-threatening deterioration requiring highest level of physician preparedness for urgent intervention.      Sophia Cherry PA-C  Neurocritical Care  [facility]

## 2022-07-27 NOTE — PT/OT/SLP EVAL
Physical Therapy  Evaluation and Treatment    Samaria Verma   [id number]    Time Tracking:     PT Received On: 07/27/22   PT Start Time: 0942   PT Stop Time: 1000   PT Total Time (min): 18 min    Billable Minutes: Evaluation 1 procedure and Neuromuscular Re-education 10 minutes      Recommendations:     Discharge recommendations: Home with family and HH PT/OT     Equipment recommendations: None    Barriers to Discharge: s/p tPA, limited in ED today 2* headache; would like to see ambulate with PT/OT on 7/28 prior to any possible D/C    Patient Information:     Recent Surgery: * No surgery found *      Diagnosis: Stroke due to occlusion of right middle cerebral artery, s/p tPa    Length of Stay: 0 days    General Precautions: Standard, fall  Orthopedic Precautions: None  Brace: None    Assessment:     Samaria Verma is a 46 y.o. female admitted to [facility] on 7/27/2022 for Stroke due to occlusion of right middle cerebral artery, s/p tPa. Samaria Verma tolerated evaluation fair today. She was alert and oriented x 4, able to speak and give her own history. She feels that her strength on L side (previously very weak) is much improved after receiving tPa overnight. She was able to transition supine <> sitting in stretcher with close stand-by assistance. Mostly limited today by increase in headaches (L frontal) with transitions. L side is grossly 3+/5 strength via MMT, sensation intact. Stood 1x from side of stretcher with SBA-CGA of therapist; immediate increase in c/o HA. Systolic BP dropped from 150s (sitting) to 120s (standing) with increased c/o dizziness as well so allowed seated and eventual supine rest. Once headaches and dizziness better managed, I feel she will be safe to transition home with family and HH from a strength and mobility standpoint. Discussed PT role, POC, goals and recommendations (Home with family and HH PT/OT; no DME needs) with patient; verbalized understanding. Samaria Verma would benefit from acute PT services  "to promote mobility during this admission and improve return to PLOF.    Problem List: weakness, decreased endurance, impaired self-care skills, impaired mobility, decreased sitting or standing balance, gait instability, decreased coordination, impaired cardiopulmonary response to activity and pain    Rehab Prognosis: Good; patient would benefit from acute skilled PT services to address these deficits and reach maximum level of function.    Plan:     Patient to be seen 4 x/week to address the above listed problems via gait training, therapeutic activities, therapeutic exercises, neuromuscular re-education    Plan of Care Expires: 08/26/22  Plan of Care reviewed with: patient    Subjective:     Communicated with RN prior to evaluation, appropriate to see for evaluation.    Pt found supine in bed (HOB elevated) upon PT entry to room, agreeable to evaluation.    Patient commenting: "I'm so much better after getting that medicine (tPa) last night."    Does this patient have any cultural, spiritual, Orthodoxy conflicts given the current situation? Patient has no barriers to learning. Patient verbalizes understanding of his/her program and goals and demonstrates them correctly. No cultural, spiritual, or educational needs identified.    Past Medical History:   Diagnosis Date    Anticoagulant long-term use     CHF (congestive heart failure)     Chronic systolic congestive heart failure 3/7/2017    Diabetes mellitus     Diabetes mellitus type 2 in obese 1/6/2016    Hypertension     Primary hypertension 1/6/2016      Past Surgical History:   Procedure Laterality Date    BREAST SURGERY Bilateral     reduction    CARDIAC DEFIBRILLATOR PLACEMENT  2018    INSERTION OF PACEMAKER  2018    LAPAROSCOPIC CHOLECYSTECTOMY N/A 6/8/2022    Procedure: CHOLECYSTECTOMY, LAPAROSCOPIC;  Surgeon: Sonido Kennedy MD;  Location: [facility];  Service: General;  Laterality: N/A;    TUBAL LIGATION         Living Environment:  Pt lives with " spouse and 18, 18 yo children in a 1 SH with 0 [employer]. Uses a tub/shower combo.    PLOF:  Prior to admission, patient was independent with all mobility and self-care. Working and driving (+)    DME:  Patient owns or has access to the following DME: None    Upon discharge, patient will have assistance from family.    Objective:     Patient found with: telemetry, pulse ox (continuous), blood pressure cuff, PureWick    Pain:  Pain Rating 1: 8/10 at generalized L side of her head, increased with sitting/standing and lights on  Pain Rating Post-Intervention 1: 8/10 (same, see above)    Cognitive Exam:  Patient is oriented to Person, Place, Time and Situation.  Patient follows 100% of single-step commands.    Sensation:   Intact at BLE to LT    Lower Extremity Range of Motion:  Right Lower Extremity: WFL actively  Left Lower Extremity: WFL actively    Lower Extremity Strength:  Right Lower Extremity: WFL  Left Lower Extremity: grossly 3+/5 via MMT    Functional Mobility:    · Bed Mobility:  · Supine to Sitting: stand-by assistance  · Sitting to Supine: stand-by assistance    · Transfers:  · Sit to Stand: SBA-CGA from stretcher with no AD x 1 trial(s)    · Gait:  · Unable to assess gait today 2* dizziness and increase in headache at L frontal    · Balance:  · Static Sit: Supervision at EOB    · Static Stand: Contact-Guard Assist with no AD for 45-60 seconds before needing to sit down    Additional Therapeutic Activity/Exercises:     1. She was alert and oriented x 4, able to speak and give her own history. She feels that her strength on L side (previously very weak) is much improved after receiving tPa overnight. She was able to transition supine <> sitting in stretcher with close stand-by assistance.    2. Mostly limited today by increase in headaches (L frontal) with transitions. L side is grossly 3+/5 strength via MMT, sensation intact. Stood 1x from side of stretcher with SBA-CGA of therapist; immediate increase in c/o  HA. Systolic BP dropped from 150s (sitting) to 120s (standing) with increased c/o dizziness as well so allowed seated and eventual supine rest.    3. Once headaches and dizziness better managed, I feel she will be safe to transition home with family and HH from a strength and mobility standpoint. Discussed PT role, POC, goals and recommendations (Home with family and HH PT/OT; no DME needs) with patient; verbalized understanding.     AM-PAC 6 CLICK MOBILITY  Turning over in bed (including adjusting bedclothes, sheets and blankets)?: 4  Sitting down on and standing up from a chair with arms (e.g., wheelchair, bedside commode, etc.): 3  Moving from lying on back to sitting on the side of the bed?: 4  Moving to and from a bed to a chair (including a wheelchair)?: 3  Need to walk in hospital room?: 3  Climbing 3-5 steps with a railing?: 2  Basic Mobility Total Score: 19    Patient was left supine in bed (HOB elevated) with all lines intact, call button in reach and RN notified.    Clinical Decision Making for Evaluation Complexity:  1. Body System(s) Examination: 1-2  2. Clinical Presentation: Evolving  3. Evaluation Complexity: Low    GOALS:   Multidisciplinary Problems     Physical Therapy Goals        Problem: Physical Therapy    Goal Priority Disciplines Outcome Goal Variances Interventions   Physical Therapy Goal     PT, PT/OT      Description: Goals to be met by: 22     Patient will increase functional independence with mobility by performin. Supine to sit with [place] - Not met  2. Sit to stand transfer with Supervision - Not met  3. Gait  x 200 feet with Stand-by Assistance using No Assistive Device - Not met                 Anthony Aldana, PT  2022

## 2022-07-27 NOTE — PLAN OF CARE
Samaria Verma is a 46 y.o. female [place] [facility] [place] for Stroke due to occlusion of right middle cerebral artery, s/p tPa. Samaria Verma tolerated evaluation fair today. She was alert and oriented x 4, able to speak and give her own history. She feels that her strength on L side (previously very weak) is much improved after receiving tPa overnight. She was able to transition supine <> sitting in stretcher with close stand-by assistance. Mostly limited today by increase in headaches (L frontal) with transitions. L side is grossly 3+/5 strength via MMT, sensation intact. Stood 1x from side of stretcher with SBA-CGA of therapist; immediate increase in c/o HA. Systolic BP dropped from 150s (sitting) to 120s (standing) with increased c/o dizziness as well so allowed seated and eventual supine rest. Once headaches and dizziness better managed, I feel she will be safe to transition home with family and HH from a strength and mobility standpoint. Discussed PT role, POC, goals and recommendations (Home with family and HH PT/OT; no DME needs) with patient; verbalized understanding. Samaria Verma would benefit from acute PT services to promote mobility during this admission and improve return to PLOF.    Problem: Physical Therapy  Goal: Physical Therapy Goal  Description: Goals to be met by: 22     Patient will increase functional independence with mobility by performin. Supine to sit with [place] - Not met  2. Sit to stand transfer with Supervision - Not met  3. Gait  x 200 feet with Stand-by Assistance using No Assistive Device - Not met  Outcome: Ongoing, Progressing    Anthony Aldana, PT  2022

## 2022-07-27 NOTE — ASSESSMENT & PLAN NOTE
Recent admission for laproscopic cholecystectomy (6/8/2022 - 6/9/2022) in setting of cholelithiasis.

## 2022-07-27 NOTE — PT/OT/SLP EVAL
"Speech Language Pathology Evaluation  Bedside Swallow    Patient Name:  Samaria Verma   MRN:  [id number]  Admitting Diagnosis: Stroke due to occlusion of right middle cerebral artery    Recommendations:                 General Recommendations:  Speech language evaluation  Diet recommendations:  Regular, Thin   Aspiration Precautions: 1 bite/sip at a time, Eliminate distractions, HOB to 90 degrees, Small bites/sips and Standard aspiration precautions  General Precautions: Standard, fall  Communication strategies:  none    History:     Past Medical History:   Diagnosis Date    Anticoagulant long-term use     CHF (congestive heart failure)     Chronic systolic congestive heart failure 3/7/2017    Diabetes mellitus     Diabetes mellitus type 2 in obese 1/6/2016    Hypertension     Primary hypertension 1/6/2016       Past Surgical History:   Procedure Laterality Date    BREAST SURGERY Bilateral     reduction    CARDIAC DEFIBRILLATOR PLACEMENT  2018    INSERTION OF PACEMAKER  2018    LAPAROSCOPIC CHOLECYSTECTOMY N/A 6/8/2022    Procedure: CHOLECYSTECTOMY, LAPAROSCOPIC;  Surgeon: Sonido Kennedy MD;  [facility];  Service: General;  Laterality: N/A;    TUBAL LIGATION       Prior Intubation HX:  None this admit    Modified Barium Swallow: none found on file    Chest X-Rays: 7/27: "Impression: Cardiomegaly with AICD. No significant change from prior study."    Prior diet: reg/thin    Subjective     SLP communicated with RN prior to entry and received clearance for therapy this date.   Pt awake and alert upon ST entry. Pt agreeable to participate with ST.    Pain/Comfort:  · Pain Rating 1: 8/10  · Location - Orientation 1: generalized  · Location 1: head  · Pain Addressed 1: Distraction, Reposition    Respiratory Status: Room air    Objective:     Oral Musculature Evaluation  · Oral Musculature: WFL  · Dentition: present and adequate  · Secretion Management: adequate  · Mucosal Quality: adequate  · Oral Labial " Strength and Mobility: WFL  · Lingual Strength and Mobility: WFL  · Buccal Strength and Mobility: WFL  · Volitional Cough: strong  · Volitional Swallow: timely  · Voice Prior to PO Intake: glottal montero present; appearing worsened when pt with intermittently decreased respiratory effort    Bedside Swallow Eval:   Consistencies Assessed:  · Thin liquids - several cup-edge and straw sips water  · Solids - x1/2 sheet delta cracker     Oral Phase:   · WFL    Pharyngeal Phase:   · no overt clinical signs/symptoms of aspiration  · no overt clinical signs/symptoms of pharyngeal dysphagia   · Intermittent facial grimace across trials though pt denies pain/discomfort    Compensatory Strategies  · None    ST educated pt re: role of SLP, risk factors for aspiration, current po rec, safe swallow precautions, and POC moving forward to which pt verbalized understanding. Continue ST per POC.    Assessment:     Samaria Verma is a 46 y.o. female with swallow function appearing WFL. Chart review reported dysarthric speech though appears resolved. Pt remains in need of speech/language/cognitive evaluation to determine future therapeutic plan of care.     Goals:   Multidisciplinary Problems     SLP Goals        Problem: SLP    Goal Priority Disciplines Outcome   SLP Goal     SLP Ongoing, Progressing   Description: Speech Language Pathology Goals  Goals expected to be met by 8/3:  1. Pt will participate in assessment of speech/language/cognitive skills to determine future therapeutic plan of care.                    Plan:     · Patient to be seen:  4 x/week   · Plan of Care reviewed with:    patient  · SLP Follow-Up:  Yes       Discharge recommendations:   (tbd)   Barriers to Discharge:  None    Time Tracking:     SLP Treatment Date:   07/27/22  Speech Start Time:  0849  Speech Stop Time:  0902     Speech Total Time (min):  13 min    Billable Minutes: Eval Swallow and Oral Function 5 and Self Care/Home Management Training 8  07/27/2022

## 2022-07-27 NOTE — ASSESSMENT & PLAN NOTE
03/16/2022: ECHO EF 25% with severe left ventricular global hypokinesis and mild left atrial enlargement.   Repeat ECHO

## 2022-07-27 NOTE — PLAN OF CARE
Problem: SLP  Goal: SLP Goal  Description: Speech Language Pathology Goals  Goals expected to be met by 8/3:  1. Pt will participate in assessment of speech/language/cognitive skills to determine future therapeutic plan of care.   Outcome: Ongoing, Progressing  Pt without overt s/s of aspiration or airway compromise across all trials. Initiate regular diet with thin liquids following safe swallow precautions. Continue ST per POC. See note for details.   7/27/2022

## 2022-07-27 NOTE — ASSESSMENT & PLAN NOTE
46 y.o. female with PMHx CHF (EF25%) s/p AICD, HTN, DM and HLD who presents with acute onset right MCA syndrome. LKN 22:00 7/26/2022. On evaluation patient is alert and conscious with vital signs notable for hypertension. Physical exam notable for right gaze preference, left hemiplegia/anesthesia, and loss of speech prosody; unclear if left homonomous hemianopsia/neglect present (present intermittently). NIHSS 17. CTA stroke multiphase reviewed; no LVO/high grade stenosis. MRI Ischemic protocol not obtainable due to AICD not MRI compatible.   Discussed with staff. Due to significant deficits, decision made to treat with tPA as benefits of treatment outweigh associated risks. Eligibility criteria and risks/benefits of TPA (including intracranial hemorrhage which in rare cases can be life-threatening) were discussed with ED staff as well as patient and family at bedside who expressed understanding; all are in agreement with treatment. No contraindications to IV TPA identified. Consent was obtained from the patient (patient nodded in approval and displayed thumbs up - unable to provide verbally due to dysarthria). Admit to [facility] s/p tPA for monitoring. Repeat imaging 24 hrs post tPA or if acute neurologic change on exam.    Etiology; Possible Cardio-Aortic Embolism (CHF w/ EF < 30% and left atrial enlargement)    Recommendations    Antithrombotics for secondary stroke prevention:   - Antiplatelets: None: Hold all Antithrombotics x 24 hours after IV t-PA administration    Statins for secondary stroke prevention and hyperlipidemia, if present:   - Statins: Atorvastatin- 80 mg daily    Aggressive risk factor modification:   - HTN, DM, HLD, Diet, Exercise, Obesity     Rehab efforts:   - The patient has been evaluated by a stroke team provider and the therapy needs have been fully considered based off the presenting complaints and exam findings. The following therapy evaluations are needed: PT evaluate and treat, OT evaluate  and treat, SLP evaluate and treat, PM&R evaluate for appropriate placement    Diagnostics ordered/pending:   - CT scan of head without contrast to asses brain parenchyma, Lipid Profile to assess cholesterol levels, TTE to assess cardiac function/status , TSH to assess thyroid function    VTE prophylaxis:   - None: Reason for No Pharmacological VTE Prophylaxis: Holding x 24 hours s/p treatment with alteplase (tPA)    BP parameters:   - Infarct: Post tPA, SBP <180

## 2022-07-27 NOTE — HPI
"Ms. Verma is a pleasant 45 y/o f w/ pmhx  of CHF (EF 25%), AICD (placed 2017- not MRI compatible), DM2 (on insulin at home), HTN, Back and R leg pain (seeing acute pain management for thoracic radiculopathy) who presents to [facility] ED for sudden onset LSW and slurred speech. The event began acutely while talking with her son around midnight, she reports she started feeling "funny" her heart started racing and the developed LSW and had difficulty forming words. Upon presentation to ED, patient had NIH of 16, CTH showed no acute hemorrhage, mass, or edema, and CTA revealed no LVO. She was not able to undergo MRI due to her AICD and tpa was administered (stop time 0218 7/27).     Patient to be admitted to [facility] for neuro-monitoring and a higher level of care while in tpa window.     Of note: patient reports her defibrillator has fired once 1 year ago, but not recently. She does endorse a fluttery feeling in her chest and itchy skin when she becomes anxious.     "

## 2022-07-27 NOTE — ED NOTES
Spoke to [facility] at [phone] regarding patient's c/o headache after being treated.  Pain is still 8/10.  Also requesting to eat.  Awaiting further orders

## 2022-07-27 NOTE — ED NOTES
Assumed care of patient. Pt remains on cardiac monitor, continuous pulse ox, and cycling blood pressures. Bed placed in low locked position, side rails up x2, call bell is within reach.

## 2022-07-27 NOTE — ASSESSMENT & PLAN NOTE
-Hemoglobin A1c 9.5% in June  -POCt glucose 100 on admission   -SSI w/ BG checks ACHS   -continue home gabapentin

## 2022-07-27 NOTE — NURSING
"Patient arrived to [facility] from [facility]     Report received from: ED RN, Sophia    Type of stroke/diagnosis:  R MCA stroke    TPA start 0118 and end time 0218    Thrombectomy start and end time N/A    Current symptoms: AAOx4, moves x 4 spontaneously, PERRLA 3mm, slight LSW, c/o HA 8/10 "that won't go away", cbg 207    Skin assessment done: Y  Wounds noted: rash on back    *If wounds noted, was Wound Care consulted? N    [place] Completed? Y    Patient Belongings on Admit: (be specific)    [facility] notified: MD Mayda  "

## 2022-07-27 NOTE — SUBJECTIVE & OBJECTIVE
Past Medical History:   Diagnosis Date    Anticoagulant long-term use     CHF (congestive heart failure)     Chronic systolic congestive heart failure 3/7/2017    Diabetes mellitus     Diabetes mellitus type 2 in obese 1/6/2016    Hypertension     Primary hypertension 1/6/2016     Past Surgical History:   Procedure Laterality Date    BREAST SURGERY Bilateral     reduction    CARDIAC DEFIBRILLATOR PLACEMENT  2018    INSERTION OF PACEMAKER  2018    LAPAROSCOPIC CHOLECYSTECTOMY N/A 6/8/2022    Procedure: CHOLECYSTECTOMY, LAPAROSCOPIC;  Surgeon: Sonido Kennedy MD;  Location: [facility];  Service: General;  Laterality: N/A;    TUBAL LIGATION        No current facility-administered medications on file prior to encounter.     Current Outpatient Medications on File Prior to Encounter   Medication Sig Dispense Refill    aspirin (ECOTRIN) 81 MG EC tablet Take 1 tablet (81 mg total) by mouth once daily. 30 tablet 11    atorvastatin (LIPITOR) 40 MG tablet Take 1 tablet (40 mg total) by mouth once daily. 90 tablet 3    betamethasone dipropionate 0.05 % cream Apply topically 2 (two) times daily. 45 g 0    blood sugar diagnostic Strp Use to test blood glucose 4 (four) times daily before meals and nightly. 200 each 11    blood-glucose meter (ACCU-CHEK GUIDE GLUCOSE METER) Misc Use to test blood glucose 4 (four) times daily before meals and nightly. 1 each 0    blood-glucose meter,continuous (DEXCOM ) Misc Use to check blood glucose at least 4 times a day 1 each 0    blood-glucose sensor (DEXCOM G5-G4 SENSOR) Bhumika Apply 1 sensor every 10 days 9 each 3    blood-glucose transmitter (DEXCOM G5 TRANSMITTER) Bhumika Replace every 90 days 1 each 3    carvediloL (COREG) 6.25 MG tablet Take 1 tablet (6.25 mg total) by mouth 2 (two) times daily. 60 tablet 11    famotidine (PEPCID) 20 MG tablet Take 1 tablet (20 mg total) by mouth 2 (two) times daily. for 5 days 10 tablet 0    furosemide (LASIX) 40 MG tablet Take 1 tablet (40 mg total) by  "mouth 2 (two) times daily. (Patient taking differently: Take 40 mg by mouth once daily. Takes once daily in morning) 30 tablet 1    gabapentin (NEURONTIN) 300 MG capsule Take 2 capsules (600 mg total) by mouth 2 (two) times daily. 120 capsule 3    HYDROcodone-acetaminophen (NORCO) 7.5-325 mg per tablet Take 1 tablet by mouth every 6 (six) hours as needed for Pain. 30 tablet 0    insulin aspart U-100 (NOVOLOG FLEXPEN U-100 INSULIN) 100 unit/mL (3 mL) InPn pen Inject 15 Units into the skin 4 (four) times daily with meals and nightly. Plus sliding scale, max of 90 units per day 81 mL 1    insulin aspart U-100 (NOVOLOG FLEXPEN U-100 INSULIN) 100 unit/mL (3 mL) InPn pen 15 units with meals plus ss, or max daily dose of 100 units. 30 mL 6    insulin degludec (TRESIBA FLEXTOUCH U-200) 200 unit/mL (3 mL) insulin pen Inject 100 Units into the skin every evening. 5 pen 6    insulin syringe-needle U-100 (BD INSULIN SYRINGE ULTRA-FINE) 1 mL 31 gauge x 5/16 Syrg Use to inject insulin 5 times daily 100 each 11    lancets (ACCU-CHEK SOFTCLIX LANCETS) Misc Use to check blood glucose 4 (four) times daily before meals and nightly. 200 each 5    methocarbamoL (ROBAXIN) 500 MG Tab Take 1 tablet (500 mg total) by mouth 3 (three) times daily as needed (spasm). 90 tablet 0    nitroGLYCERIN (NITROSTAT) 0.4 MG SL tablet Place 1 tablet (0.4 mg total) under the tongue every 5 (five) minutes as needed for Chest pain. 25 tablet 0    ondansetron (ZOFRAN) 4 MG tablet Take 1 tablet (4 mg total) by mouth every 6 (six) hours as needed for Nausea. 20 tablet 0    pen needle, diabetic 31 gauge x 5/16" Ndle Use to inject insulin 4 times daily 300 each 3    sacubitriL-valsartan (ENTRESTO) 24-26 mg per tablet Take 1 tablet by mouth 2 (two) times daily. 180 tablet 3    [DISCONTINUED] albuterol (PROVENTIL/VENTOLIN HFA) 90 mcg/actuation inhaler Inhale 2 puffs into the lungs every 6 (six) hours as needed. Rescue 18 g 0    [DISCONTINUED] azelastine (ASTELIN) " 137 mcg (0.1 %) nasal spray 1 spray (137 mcg total) by Nasal route 2 (two) times daily. 30 mL 0    [DISCONTINUED] clonazePAM (KLONOPIN) 0.5 MG tablet Take 0.5 mg by mouth 2 (two) times daily as needed for Anxiety.        Allergies: Lisinopril    Family History   Problem Relation Age of Onset    Heart disease Mother     Arthritis Mother     Heart disease Father     Hypertension Father     Asthma Son     Heart disease Paternal Grandmother     Pulmonary embolism Unknown     Glaucoma Neg Hx     Macular degeneration Neg Hx     Retinal detachment Neg Hx      Social History     Tobacco Use    Smoking status: Never Smoker    Smokeless tobacco: Never Used   Substance Use Topics    Alcohol use: No    Drug use: No     Review of Systems   Constitutional:  Negative for chills and fever.   HENT:  Negative for sore throat and trouble swallowing.    Eyes:  Negative for photophobia and visual disturbance.   Respiratory:  Negative for cough and shortness of breath.    Cardiovascular:  Negative for chest pain, palpitations and leg swelling.   Gastrointestinal:  Negative for abdominal pain, nausea and vomiting.   Genitourinary:  Negative for difficulty urinating.   Musculoskeletal:  Negative for arthralgias, back pain and joint swelling.   Skin:  Negative for color change, pallor and rash.   Neurological:  Positive for facial asymmetry (L, resolved), speech difficulty (Improving drastically since onset.), weakness (LSW improving), numbness (LUE, LLE. Improving.) and headaches (global, improving). Negative for dizziness, tremors, seizures, syncope and light-headedness.   Psychiatric/Behavioral:  Negative for agitation and confusion.    Objective:     Vitals:    Temp: 98.8 °F (37.1 °C)  Pulse: 65  BP: (!) 160/78  MAP (mmHg): 112  Resp: 16  SpO2: (!) 94 %  O2 Device (Oxygen Therapy): room air    Temp  Min: 98.8 °F (37.1 °C)  Max: 98.8 °F (37.1 °C)  Pulse  Min: 62  Max: 75  BP  Min: 131/62  Max: 175/74  MAP (mmHg)  Min: 89  Max: 112  Resp   Min: 15  Max: 20  SpO2  Min: 93 %  Max: 98 %    No intake/output data recorded.         Physical Exam - At end of tpa infusion     Constitutional: Well-nourished, well-developed. Appears slightly older than stated age. Patient becomes appropriately tearful during exam.      Eyes: Clear conjunctiva. Anicteric. No discharge.   Lids without lesions.    HEENT: Normocephalic, atraumatic.   Nose and external ears atraumatic.   Mucus membranes are moist.     Cardio: Regular rate and rhythm. Wide complexes noted intermittently on monitor at bedside in ED.   Pulses intact in bilateral upper and lower extremities.   Capillary refill time < 2 seconds.    Respiratory: Regular effort, no respiratory distress.  Clear to auscultation throughout all lung fields.     GI: Bowel sounds present in all 4 quadrants.   Soft, non-distended, non-tender.  No appreciable hepatosplenomegaly.     Skin: No erythema or rash to exposed skin. Excoriations and minor scabs over lower both sides of abdomen.     Neuro:E4 V5 M6    Awake, alert, and oriented to self, time, place, and situation. Patient is answering all questions appropriately and following all commands briskly.   No facial droop. EOMI. PERRL. Peripheral vision intact bilaterally.     Motor:   RUE: 5/5  LUE: 3/5  RLE: 5/5  LLE: 4/5    Sensory:  Sensation intact on right side, diminished sensation to rough touch on LUE and LLE.      07/27/22 0202   NIH Stroke Scale   1a. Level of Consciousness 0-->Alert, keenly responsive   1b. LOC Questions 0-->Answers both questions correctly   1c. LOC Commands 0-->Performs both tasks correctly   2. Best Gaze 0-->Normal   3. Visual 0-->No visual loss   4. Facial Palsy 0-->Normal symmetrical movements   5a. Motor Arm, Left 2-->Some effort against gravity, limb cannot get to or maintain (if cued) 90 (or 45) degrees, drifts down to bed, but has some effort against gravity   5b. Motor Arm, Right 0-->No drift, limb holds 90 (or 45) degrees for full 10 secs    6a. Motor Leg, Left 1-->Drift, leg falls by the end of the 5-sec period but does not hit bed   6b. Motor Leg, Right 0-->No drift, leg holds 30 degree position for full 5 secs   7. Limb Ataxia 0-->Absent   8. Sensory 1-->Mild-to-moderate sensory loss, patient feels pinprick is less sharp or is dull on the affected side, or there is a loss of superficial pain with pinprick, but patient is aware of being touched   9. Best Language 0-->No aphasia, normal   10. Dysarthria 1-->Mild-to-moderate dysarthria, patient slurs at least some words and, at worst, can be understood with some difficulty   11. Extinction and Inattention (formerly Neglect) 1-->Visual, tactile, auditory, spatial, or personal inattention or extinction to bilateral simultaneous stimulation in one of the sensory modalities   Total (NIH Stroke Scale) 6     Today I personally reviewed pertinent medications, lines/drains/airways, imaging, cardiology results, laboratory results, notably: CTH, EKG, Medications

## 2022-07-27 NOTE — HPI
Stroke code activated on Samaria Verma, a 46 y.o. female with history of CHF s/p AICD (non-MRI compatible), DM2, and HTN who presents to ED via  and son with symptoms concerning for large vessel occlusion. History obtained via family at bedside and EMR. Limited history obtained from patient as she presents severely dysarthric.     IFEOMA 10:00 PM 7/26/2022. Patient in usual state of health when  reports she collapsed. Patient was unable to speak but could write/type on husbands phone that she couldn't feel the left side of her body. Patient was driven to [facility] via  for further evaluation. Although speech is limited, patient is able to nod yes/no to questions. Prior to collapse patient reports palpitations and headache.

## 2022-07-27 NOTE — ASSESSMENT & PLAN NOTE
Previous Lipid panel 1 year prior with elevated cholesterol, triglycerides, and LDL  Stroke risk factor  Consider increase of home atorvastatin 40 mg QD to 80 mg QD  LDL goal < 70  Repeat lipid panel

## 2022-07-27 NOTE — ASSESSMENT & PLAN NOTE
Stroke risk factor  Infarct: Post tPA, SBP <180  Resume home medications when clinically indicated

## 2022-07-27 NOTE — PT/OT/SLP EVAL
Occupational Therapy   Co- Evaluation and Treatment w/ PT    Additional staff present: PT for co-eval/tx due to patient's medical complexities requiring two skilled therapists in order to appropriately assess patient's functional deficits as well as ensure patient safety, accommodate for limited activity tolerance, and provide appropriate, skilled assistance to maximize functional potential during evaluation.    Name: Samaria Verma  MRN: [id number]  Admitting Diagnosis:  Stroke due to occlusion of right middle cerebral artery    Length of Stay: 0 days         Recommendations:     Discharge Recommendations: home with home health  Discharge Equipment Recommendations:  none  Barriers to discharge:  None    Plan:     Patient to be seen 3 x/week to address the above listed problems via self-care/home management, therapeutic activities, therapeutic exercises, neuromuscular re-education  · Plan of Care Expires: 22  · Plan of Care Reviewed with: patient    Assessment:     Samaria Verma is a 46 y.o. female with a medical diagnosis of Stroke due to occlusion of right middle cerebral artery.  She presents with the following performance deficits affecting function: weakness, impaired endurance, impaired self care skills, impaired functional mobility, gait instability, impaired balance, decreased safety awareness, decreased lower extremity function.  Pt would benefit from skilled OT services in order to maximize independence with ADLs and facilitate safe discharge. Pt would benefit from home health OT once medically stable for discharge.     Time spent evaluating pt, performing bed mobility, EOB activity, self-care and STS    Relevant hx and current limitations:   Affected side: left    Neglect/Inattention: none   Command Followin% simple commands   EOB sitting: CGA-SBA   Diet: thin liquids and regular diet    Rehab Prognosis: Good; patient would benefit from acute skilled OT services to address these deficits and reach  maximum level of function.       Subjective   Communicated with: PREETI Castellon prior to session.  Patient found HOB elevated with pulse ox (continuous), telemetry, blood pressure cuff, PureWick upon OT entry to room.    Chief Complaint: Cerebrovascular Accident (Pt presents with L sided numbness and weakness starting 2 hours ago. Pt presents with expressive aphasia, unable to follow commands. Pt is aware of what is going on but is unable to perform tasks. )    Patient/Family Comments/goals: Want headache to go away and return home    Pain/Comfort:  · Pain Rating 1: 8/10  · Location - Orientation 1: generalized  · Location 1: head    Patients cultural, spiritual, Voodoo conflicts given the current situation: no    Occupational Profile:  Living Environment: lives w/ spouse & 2 kids (17,18) in [facility] [address]; t/s combo  Prior Level of Function: Patient reports being Indepenent with mobility & with ADLs.   Roles/Repsonsibilities:   Hand Dominance: right   Work: yes. Accounting  Drive: yes.   Managing Medicines/Managing Home: yes.   Equipment Used at Home:  none    Patient reports they will have assistance from family upon discharge.      Objective:     Patient found with: pulse ox (continuous), telemetry, blood pressure cuff, PureWick   General Precautions: fall   Orthopedic Precautions:N/A   Braces: N/A   Oxygen Device: Room Air  Vitals: BP (!) 144/67   Pulse 69   Temp 98.4 °F (36.9 °C) (Oral)   Resp 20   Wt 86.2 kg (190 lb)   LMP  (LMP Unknown)   SpO2 95%   BMI 30.67 kg/m²     Cognitive and Psychosocial Function:   · AOx4 -- Person, Place, Time and Situation   · Follows Commands/attention:follows multi-step commands  · Communication:  clear/fluent  · Memory: No Deficits noted  · Safety awareness/insight to disability: intact     Hearing: Intact    Vision:  Intact visual fields    Physical Exam:     Left UE Right UE   UE Edema N/A N/A   UE ROM AROM: WFL AROM: WFL   UE Strength WFL WFL    Strength WFL WFL    Sensation normal normal   Fine Motor Coordination:  Intact Intact   Gross Motor Coordination: Intact Intact     Occupational Performance:  Bed Mobility:       Rolling left: stand by assistance   Scooting: towards EOB with stand by assistance   Supine to Sit: contact guard assistance   Sit to Supine: contact guard assistance    Functional Mobility/Transfers:     Sit to Stand: contact guard assistance using hand-held assist   o Pt w/ complaint in increased HA, dizziness and nausea (drop in  mmHg to 122 mmHg), ambulation deferred d/t symptomatic    Activities of Daily Living:     Grooming: supervision to perform facial and hair hygiene sitting EOB   Upper Body Dressing: supervision to doff/don hospital gown sitting EOB (simulated)   Lower Body Dressing: supervision to don/doff socks HOB elevated   Toileting: supervision to perform perineal hygiene and clothing management from HOB elevated ; simulated anterior pericare    AMPAC 6 Click ADL:  AMPAC Total Score: 21    Treatment & Education:   Therapist provided facilitation and instruction of proper body mechanics, energy conservation, and fall prevention strategies during tasks listed above.   Pt educated on role of OT, POC and goals for therapy   Pt educated on importance of OOB activities with staff member assistance and sitting OOB majority of the day.    Updated communication board with level of assist required (CGA) & educated RN/patient that pt is appropriate for transfers and mobility with RN/PCT.       Patient left HOB elevated with all lines intact, call button in reach and RN notified    GOALS:   Multidisciplinary Problems     Occupational Therapy Goals        Problem: Occupational Therapy    Goal Priority Disciplines Outcome Interventions   Occupational Therapy Goal     OT, PT/OT Ongoing, Progressing    Description: Goals to be met by: 8/24/2022     Patient will increase functional independence with ADLs by performing:    Feeding with  [place].  UE Dressing with [place].  LE Dressing with [place].  Grooming while standing at sink with [place].  Toileting from toilet with [place] for hygiene and clothing management.   Bathing from  shower chair/bench with [place].  Toilet transfer to toilet with [place].                     History:     Past Medical History:   Diagnosis Date    Anticoagulant long-term use     CHF (congestive heart failure)     Chronic systolic congestive heart failure 3/7/2017    Diabetes mellitus     Diabetes mellitus type 2 in obese 1/6/2016    Hypertension     Primary hypertension 1/6/2016       Past Surgical History:   Procedure Laterality Date    BREAST SURGERY Bilateral     reduction    CARDIAC DEFIBRILLATOR PLACEMENT  2018    INSERTION OF PACEMAKER  2018    LAPAROSCOPIC CHOLECYSTECTOMY N/A 6/8/2022    Procedure: CHOLECYSTECTOMY, LAPAROSCOPIC;  Surgeon: Sonido Kennedy MD;  Location: [facility];  Service: General;  Laterality: N/A;    TUBAL LIGATION         Time Tracking:       OT Date of Treatment: 07/27/22  OT Start Time: 1004  OT Stop Time: 1023  OT Total Time (min): 19 min    Additional staff present: PT     Billable Minutes:  Evaluation 11  Self Care/Home Management Guy Rose (Ali), OTR/L  [phone] (Pager #)  7/27/2022

## 2022-07-27 NOTE — ED NOTES
Pt c/o difficulty swallowing, SOB, and itching. States it feels like her throat is closing. No redness or hives present. VS ermain stable. No voice change present. ED MD made aware. Neuro CC paged and updated. Awaiting new orders at this time

## 2022-07-27 NOTE — CONSULTS
Inpatient consult to Physical Medicine Rehab  Consult performed by: Karina Alexander NP  Consult ordered by: Sophia Cherry PA-C      Consult received.  Reviewed patient history and current admission.  PM&R following.    Karina Alexander NP  Physical Medicine & Rehabilitation   07/27/2022

## 2022-07-27 NOTE — PLAN OF CARE
Problem: Occupational Therapy  Goal: Occupational Therapy Goal  Description: Goals to be met by: 8/24/2022     Patient will increase functional independence with ADLs by performing:    Feeding with Scott.  UE Dressing with [place].  LE Dressing with [place].  Grooming while standing at sink with [place].  Toileting from toilet with [place] for hygiene and clothing management.   Bathing from  shower chair/bench with [place].  Toilet transfer to toilet with [place].    Outcome: Ongoing, Progressing     Evaluation complete-see note for details. Goals and POC established.

## 2022-07-27 NOTE — SUBJECTIVE & OBJECTIVE
Past Medical History:   Diagnosis Date    Anticoagulant long-term use     CHF (congestive heart failure)     Chronic systolic congestive heart failure 3/7/2017    Diabetes mellitus     Diabetes mellitus type 2 in obese 1/6/2016    Hypertension     Primary hypertension 1/6/2016     Past Surgical History:   Procedure Laterality Date    BREAST SURGERY Bilateral     reduction    CARDIAC DEFIBRILLATOR PLACEMENT  2018    INSERTION OF PACEMAKER  2018    LAPAROSCOPIC CHOLECYSTECTOMY N/A 6/8/2022    Procedure: CHOLECYSTECTOMY, LAPAROSCOPIC;  Surgeon: Sonido Kennedy MD;  Location: [facility];  Service: General;  Laterality: N/A;    TUBAL LIGATION       Family History   Problem Relation Age of Onset    Heart disease Mother     Arthritis Mother     Heart disease Father     Hypertension Father     Asthma Son     Heart disease Paternal Grandmother     Pulmonary embolism Unknown     Glaucoma Neg Hx     Macular degeneration Neg Hx     Retinal detachment Neg Hx      Social History     Tobacco Use    Smoking status: Never Smoker    Smokeless tobacco: Never Used   Substance Use Topics    Alcohol use: No    Drug use: No     Review of patient's allergies indicates:   Allergen Reactions    Lisinopril Rash       Medications: I have reviewed the current medication administration record.    (Not in a hospital admission)      Review of Systems   Unable to perform ROS: Other (significant dysarthria)   Eyes:  Negative for visual disturbance.   Cardiovascular:  Positive for palpitations. Negative for chest pain.   Neurological:  Positive for speech difficulty, weakness, numbness and headaches. Negative for facial asymmetry.   Objective:     Vital Signs (Most Recent):  Temp: 98.8 °F (37.1 °C) (07/27/22 0023)  Pulse: 62 (07/27/22 0147)  Resp: 17 (07/27/22 0147)  BP: (!) 151/71 (07/27/22 0147)  SpO2: 96 % (07/27/22 0147)    Vital Signs Range (Last 24H):  Temp:  [98.8 °F (37.1 °C)]   Pulse:  [62-75]   Resp:  [15-20]   BP: (131-175)/(62-82)   SpO2:   [93 %-98 %]     Physical Exam  Vitals reviewed.   HENT:      Head: Normocephalic and atraumatic.      Right Ear: External ear normal.      Left Ear: External ear normal.      Nose: Nose normal.      Mouth/Throat:      Mouth: Mucous membranes are moist.      Pharynx: Oropharynx is clear.   Eyes:      Conjunctiva/sclera: Conjunctivae normal.      Pupils: Pupils are equal, round, and reactive to light.   Pulmonary:      Effort: Pulmonary effort is normal. No respiratory distress.      Breath sounds: Normal breath sounds.   Abdominal:      General: There is no distension.      Tenderness: There is abdominal tenderness (RUQ).   Musculoskeletal:         General: No swelling or deformity. Normal range of motion.   Skin:     General: Skin is warm and dry.      Capillary Refill: Capillary refill takes less than 2 seconds.   Neurological:      Mental Status: She is alert.   Psychiatric:         Mood and Affect: Mood normal.         Behavior: Behavior normal.       Neurological Exam:   LOC: alert  Attention Span: Good   Language: Expressive aphasia  Articulation: Dysarthria  Orientation: Untestable due to significant dysarthria on presentation  Visual Fields: Full  EOM (CN III, IV, VI): Gaze preference right  Pupils (CN II, III): PERRL  Facial Sensation (CN V): Normal  Facial Movement (CN VII): Symmetric facial expression    Motor: Arm left  Plegia 0/5  Leg left  Plegia 0/5  Arm right  Normal 5/5  Leg right Normal 5/5  Sensation: Carmine-anesthesia left      Laboratory:  CMP: No results for input(s): GLUCOSE, CALCIUM, ALBUMIN, PROT, NA, K, CO2, CL, BUN, CREATININE, ALKPHOS, ALT, AST, BILITOT in the last 24 hours.  CBC:   Recent Labs   Lab 07/27/22  0030   WBC 13.59*   RBC 4.13   HGB 11.1*   HCT 34.7*      MCV 84   MCH 26.9*   MCHC 32.0     Lipid Panel: No results for input(s): CHOL, LDLCALC, HDL, TRIG in the last 168 hours.  Coagulation:   Recent Labs   Lab 07/27/22  0030   INR 1.1     Hgb A1C: No results for input(s):  HGBA1C in the last 168 hours.  TSH: No results for input(s): TSH in the last 168 hours.    Diagnostic Results:      Brain imaging:  Pending    Vessel Imaging:  CTA STROKE MULTI-PHASE 07/27/2022    Narrative  EXAMINATION:  CTA STROKE MULTI-PHASE    CLINICAL HISTORY:  Neuro deficit, acute, stroke suspected;    TECHNIQUE:  Non contrast low dose axial images were obtained thought the head. CT angiogram was performed from the level of the lisa to the top of the head following the IV administration of 100mL of Omnipaque 350.   Sagittal and coronal reconstructions and maximum intensity projection reconstructions were performed. Arterial stenosis percentages are based on NASCET measurement criteria.  Two additional phases of immediate post-contrast CTA images were performed through the head alone.    COMPARISON:  CT head without contrast 03/16/2022 and CTA stroke multiphase 03/15/2022.    FINDINGS:  Intracranial Compartment:    Ventricles and sulci are normal in size for age without evidence of hydrocephalus. No extra-axial blood or fluid collections.    The brain parenchyma appears normal. No parenchymal mass, hemorrhage, edema, or major vascular distribution infarct.    Skull/Extracranial Contents (limited evaluation): No fracture. Mastoid air cells and paranasal sinuses are essentially clear.    Non-Vascular Structures of the Neck/Thoracic Inlet (limited evaluation): Right thyroid nodule measuring 1.0 cm.  Left thyroid nodule measuring 0.8 cm.  Bilateral parotid and submandibular glands are unremarkable.  Motion artifact limiting assessment of the visualized portions of the lungs.    Aorta: Normal 3 vessel arch.    Extracranial carotid circulation: No hemodynamically significant stenosis, aneurysmal dilatation, or dissection of the bilateral common carotid arteries or bilateral internal carotid arteries.    Extracranial vertebral circulation: Mild asymmetry of the vertebral arteries with slight left vertebral artery  dominance, likely congenital.    Intracranial Arteries: Mild calcific atherosclerosis of the bilateral internal carotid arteries but significant stenosis.  Bilateral MCAs, ACAs, and PCAs are patent.    Venous structures (limited evaluation): Normal.    Impression  No acute abnormality. No high-grade stenosis or major vessel occlusion.    Bilateral thyroid nodules measuring up to 1.0 cm.  Consider nonemergent thyroid ultrasound for further evaluation when clinically appropriate.    Electronically signed by resident: Jenny Tam  Date:    07/27/2022  Time:    01:01    Electronically signed by: Derrick Lewis MD  Date:    07/27/2022  Time:    01:28    Cardiac Evaluation:   Pending

## 2022-07-27 NOTE — CONSULTS
[facility] Dept  Vascular Neurology  [facility]  Consult Note    Inpatient consult to Vascular (Stroke) Neurology  Consult performed by: Hermilo Gallardo MD  Consult ordered by: Cande Rea MD        Assessment/Plan:     Patient is a 46 y.o. year old female with:    * Stroke due to occlusion of right middle cerebral artery  46 y.o. female with PMHx CHF (EF25%) s/p AICD, HTN, DM and HLD who presents with acute onset right MCA syndrome. LKN 22:00 7/26/2022. On evaluation patient is alert and conscious with vital signs notable for hypertension. Physical exam notable for right gaze preference, left hemiplegia/anesthesia, and loss of speech prosody; unclear if left homonomous hemianopsia/neglect present (present intermittently). NIHSS 17. CTA stroke multiphase reviewed; no LVO/high grade stenosis. MRI Ischemic protocol not obtainable due to AICD not MRI compatible.   Discussed with staff. Due to significant deficits, decision made to treat with tPA as benefits of treatment outweigh associated risks. Eligibility criteria and risks/benefits of TPA (including intracranial hemorrhage which in rare cases can be life-threatening) were discussed with ED staff as well as patient and family at bedside who expressed understanding; all are in agreement with treatment. No contraindications to IV TPA identified. Consent was obtained from the patient (patient nodded in approval and displayed thumbs up - unable to provide verbally due to dysarthria). Admit to NCC s/p tPA for monitoring. Repeat imaging 24 hrs post tPA or if acute neurologic change on exam.    Etiology; Possible Cardio-Aortic Embolism (CHF w/ EF < 30% and left atrial enlargement)    Recommendations    Antithrombotics for secondary stroke prevention:   - Antiplatelets: None: Hold all Antithrombotics x 24 hours after IV t-PA administration    Statins for secondary stroke prevention and hyperlipidemia, if present:   - Statins:  Atorvastatin- 80 mg daily    Aggressive risk factor modification:   - HTN, DM, HLD, Diet, Exercise, Obesity     Rehab efforts:   - The patient has been evaluated by a stroke team provider and the therapy needs have been fully considered based off the presenting complaints and exam findings. The following therapy evaluations are needed: PT evaluate and treat, OT evaluate and treat, SLP evaluate and treat, PM&R evaluate for appropriate placement    Diagnostics ordered/pending:   - CT scan of head without contrast to asses brain parenchyma, Lipid Profile to assess cholesterol levels, TTE to assess cardiac function/status , TSH to assess thyroid function    VTE prophylaxis:   - None: Reason for No Pharmacological VTE Prophylaxis: Holding x 24 hours s/p treatment with alteplase (tPA)    BP parameters:   - Infarct: Post tPA, SBP <180    RUQ abdominal tenderness  Recent admission for laproscopic cholecystectomy (6/8/2022 - 6/9/2022) in setting of cholelithiasis.     Mixed hyperlipidemia  Previous Lipid panel 1 year prior with elevated cholesterol, triglycerides, and LDL  Stroke risk factor  Consider increase of home atorvastatin 40 mg QD to 80 mg QD  LDL goal < 70  Repeat lipid panel    Chronic systolic congestive heart failure  03/16/2022: ECHO EF 25% with severe left ventricular global hypokinesis and mild left atrial enlargement.   Repeat ECHO    Primary hypertension  Stroke risk factor  Infarct: Post tPA, SBP <180  Resume home medications when clinically indicated      Diabetes mellitus type 2 in obese  Stroke risk factor  A1c 9.5% 1 month prior  BG goals inpatient 140-180      STROKE DOCUMENTATION     Acute Stroke Times   Last Known Normal Date: 07/26/22  Last Known Normal Time: 2200  Symptom Onset Date: 07/27/22  Symptom Onset Time: 2200  Stroke Team Called Date: 07/27/22  Stroke Team Called Time: 0010  Stroke Team Arrival Date: 07/27/22  Stroke Team Arrival Time: 0012  CT Interpretation Time: 0040  Alteplase  Recommended: Yes  CTA Interpretation Time: 0047  Thrombectomy Recommended: No  MRI Acute Stroke Protocol Interpretation Time:  (unable to obtain due to pacemaker)  Decision to Treat Time for Alteplase: 0056    NIH Scale:  Interval: initial 15 minute assessment from arrival  1a. Level of Consciousness: 0-->Alert, keenly responsive  1b. LOC Questions: 2-->Answers neither question correctly  1c. LOC Commands: 0-->Performs both tasks correctly  2. Best Gaze: 1-->Partial gaze palsy, gaze is abnormal in one or both eyes, but forced deviation or total gaze paresis is not present  3. Visual: 0-->No visual loss  4. Facial Palsy: 0-->Normal symmetrical movements  5a. Motor Arm, Left: 4-->No movement  5b. Motor Arm, Right: 0-->No drift, limb holds 90 (or 45) degrees for full 10 secs  6a. Motor Leg, Left: 4-->No movement  6b. Motor Leg, Right: 0-->No drift, leg holds 30 degree position for full 5 secs  7. Limb Ataxia: 1-->Present in one limb  8. Sensory: 2-->Severe to total sensory loss, patient is not aware of being touched in the face, arm, and leg  9. Best Language: 1-->Mild-to-moderate aphasia, some obvious loss of fluency or facility of comprehension, without significant limitation on ideas expressed or form of expression. Reduction of speech and/or comprehension, however, makes conversation. . . (see row details)  10. Dysarthria: 1-->Mild-to-moderate dysarthria, patient slurs at least some words and, at worst, can be understood with some difficulty  11. Extinction and Inattention (formerly Neglect): 1-->Visual, tactile, auditory, spatial, or personal inattention or extinction to bilateral simultaneous stimulation in one of the sensory modalities  Total (NIH Stroke Scale): 17    Modified Eagar Score: 0  [place] Coma Scale:15   ABCD2 Score:    [id number] Score:   HAS -BLED Score:   ICH Score:   Hunt & Pinedo Classification:       Thrombolysis Candidate? Yes. The risks and benefits of tPA were discussed with the patient  and/or family. The patient and/or family verbalized understanding of the risks arid benefits and has given verbal consent for tPA, If patient was not competent or no family was available, treatment will be administered as an emergency procedure and in what we believe to be the patients best interest    Delays to Thrombolysis?  No    Interventional Revascularization Candidate?   Is the patient eligible for mechanical endovascular reperfusion (MYLA)?  No; No large vessel occlusion identified on imaging     Delays to Thrombectomy? Not Applicable    Hemorrhagic change of an Ischemic Stroke: Does this patient have an ischemic stroke with hemorrhagic changes? No     Subjective:     History of Present Illness:  Stroke code activated on Samaria Verma, a 46 y.o. female with history of CHF s/p AICD (non-MRI compatible), DM2, and HTN who presents to ED via  and son with symptoms concerning for large vessel occlusion. History obtained via family at bedside and EMR. Limited history obtained from patient as she presents severely dysarthric.     LKN 10:00 PM 7/26/2022. Patient in usual state of health when  reports she collapsed. Patient was unable to speak but could write/type on husbands phone that she couldn't feel the left side of her body. [place] [facility] [place] further evaluation. Although speech is limited, patient is able to nod yes/no to questions. Prior to collapse patient reports palpitations and headache.          Past Medical History:   Diagnosis Date    Anticoagulant long-term use     CHF (congestive heart failure)     Chronic systolic congestive heart failure 3/7/2017    Diabetes mellitus     Diabetes mellitus type 2 in obese 1/6/2016    Hypertension     Primary hypertension 1/6/2016     Past Surgical History:   Procedure Laterality Date    BREAST SURGERY Bilateral     reduction    CARDIAC DEFIBRILLATOR PLACEMENT  2018    INSERTION OF PACEMAKER  2018    LAPAROSCOPIC  CHOLECYSTECTOMY N/A 6/8/2022    Procedure: CHOLECYSTECTOMY, LAPAROSCOPIC;  Surgeon: Sonido Kennedy MD;  [facility];  Service: General;  Laterality: N/A;    TUBAL LIGATION       Family History   Problem Relation Age of Onset    Heart disease Mother     Arthritis Mother     Heart disease Father     Hypertension Father     Asthma Son     Heart disease Paternal Grandmother     Pulmonary embolism Unknown     Glaucoma Neg Hx     Macular degeneration Neg Hx     Retinal detachment Neg Hx      Social History     Tobacco Use    Smoking status: Never Smoker    Smokeless tobacco: Never Used   Substance Use Topics    Alcohol use: No    Drug use: No     Review of patient's allergies indicates:   Allergen Reactions    Lisinopril Rash       Medications: I have reviewed the current medication administration record.    (Not in a hospital admission)      Review of Systems   Unable to perform ROS: Other (significant dysarthria)   Eyes:  Negative for visual disturbance.   Cardiovascular:  Positive for palpitations. Negative for chest pain.   Neurological:  Positive for speech difficulty, weakness, numbness and headaches. Negative for facial asymmetry.   Objective:     Vital Signs (Most Recent):  Temp: 98.8 °F (37.1 °C) (07/27/22 0023)  Pulse: 62 (07/27/22 0147)  Resp: 17 (07/27/22 0147)  BP: (!) 151/71 (07/27/22 0147)  SpO2: 96 % (07/27/22 0147)    Vital Signs Range (Last 24H):  Temp:  [98.8 °F (37.1 °C)]   Pulse:  [62-75]   Resp:  [15-20]   BP: (131-175)/(62-82)   SpO2:  [93 %-98 %]     Physical Exam  Vitals reviewed.   HENT:      Head: Normocephalic and atraumatic.      Right Ear: External ear normal.      Left Ear: External ear normal.      Nose: Nose normal.      Mouth/Throat:      Mouth: Mucous membranes are moist.      Pharynx: Oropharynx is clear.   Eyes:      Conjunctiva/sclera: Conjunctivae normal.      Pupils: Pupils are equal, round, and reactive to light.   Pulmonary:      Effort: Pulmonary effort is  normal. No respiratory distress.      Breath sounds: Normal breath sounds.   Abdominal:      General: There is no distension.      Tenderness: There is abdominal tenderness (RUQ).   Musculoskeletal:         General: No swelling or deformity. Normal range of motion.   Skin:     General: Skin is warm and dry.      Capillary Refill: Capillary refill takes less than 2 seconds.   Neurological:      Mental Status: She is alert.   Psychiatric:         Mood and Affect: Mood normal.         Behavior: Behavior normal.       Neurological Exam:   LOC: alert  Attention Span: Good   Language: Expressive aphasia  Articulation: Dysarthria  Orientation: Untestable due to significant dysarthria on presentation  Visual Fields: Full  EOM (CN III, IV, VI): Gaze preference right  Pupils (CN II, III): PERRL  Facial Sensation (CN V): Normal  Facial Movement (CN VII): Symmetric facial expression    Motor: Arm left  Plegia 0/5  Leg left  Plegia 0/5  Arm right  Normal 5/5  Leg right Normal 5/5  Sensation: Carmine-anesthesia left      Laboratory:  CMP: No results for input(s): GLUCOSE, CALCIUM, ALBUMIN, PROT, NA, K, CO2, CL, BUN, CREATININE, ALKPHOS, ALT, AST, BILITOT in the last 24 hours.  CBC:   Recent Labs   Lab 07/27/22  0030   WBC 13.59*   RBC 4.13   HGB 11.1*   HCT 34.7*      MCV 84   MCH 26.9*   MCHC 32.0     Lipid Panel: No results for input(s): CHOL, LDLCALC, HDL, TRIG in the last 168 hours.  Coagulation:   Recent Labs   Lab 07/27/22  0030   INR 1.1     Hgb A1C: No results for input(s): HGBA1C in the last 168 hours.  TSH: No results for input(s): TSH in the last 168 hours.    Diagnostic Results:      Brain imaging:  Pending    Vessel Imaging:  CTA STROKE MULTI-PHASE 07/27/2022    Narrative  EXAMINATION:  CTA STROKE MULTI-PHASE    CLINICAL HISTORY:  Neuro deficit, acute, stroke suspected;    TECHNIQUE:  Non contrast low dose axial images were obtained thought the head. CT angiogram was performed from the level of the lisa to the  top of the head following the IV administration of 100mL of Omnipaque 350.   Sagittal and coronal reconstructions and maximum intensity projection reconstructions were performed. Arterial stenosis percentages are based on NASCET measurement criteria.  Two additional phases of immediate post-contrast CTA images were performed through the head alone.    COMPARISON:  CT head without contrast 03/16/2022 and CTA stroke multiphase 03/15/2022.    FINDINGS:  Intracranial Compartment:    Ventricles and sulci are normal in size for age without evidence of hydrocephalus. No extra-axial blood or fluid collections.    The brain parenchyma appears normal. No parenchymal mass, hemorrhage, edema, or major vascular distribution infarct.    Skull/Extracranial Contents (limited evaluation): No fracture. Mastoid air cells and paranasal sinuses are essentially clear.    Non-Vascular Structures of the Neck/Thoracic Inlet (limited evaluation): Right thyroid nodule measuring 1.0 cm.  Left thyroid nodule measuring 0.8 cm.  Bilateral parotid and submandibular glands are unremarkable.  Motion artifact limiting assessment of the visualized portions of the lungs.    Aorta: Normal 3 vessel arch.    Extracranial carotid circulation: No hemodynamically significant stenosis, aneurysmal dilatation, or dissection of the bilateral common carotid arteries or bilateral internal carotid arteries.    Extracranial vertebral circulation: Mild asymmetry of the vertebral arteries with slight left vertebral artery dominance, likely congenital.    Intracranial Arteries: Mild calcific atherosclerosis of the bilateral internal carotid arteries but significant stenosis.  Bilateral MCAs, ACAs, and PCAs are patent.    Venous structures (limited evaluation): Normal.    Impression  No acute abnormality. No high-grade stenosis or major vessel occlusion.    Bilateral thyroid nodules measuring up to 1.0 cm.  Consider nonemergent thyroid ultrasound for further evaluation  when clinically appropriate.    Electronically signed by resident: Jenny Tam  Date:    07/27/2022  Time:    01:01    Electronically signed by: Derrick Lewis MD  Date:    07/27/2022  Time:    01:28    Cardiac Evaluation:   Pending        Hermilo Gallardo MD  [facility]  Department of Vascular Neurology   [facility] - Emergency Dept

## 2022-07-28 VITALS
HEART RATE: 81 BPM | TEMPERATURE: 99 F | SYSTOLIC BLOOD PRESSURE: 114 MMHG | DIASTOLIC BLOOD PRESSURE: 59 MMHG | OXYGEN SATURATION: 93 % | BODY MASS INDEX: 30.85 KG/M2 | WEIGHT: 185.19 LBS | HEIGHT: 65 IN | RESPIRATION RATE: 21 BRPM

## 2022-07-28 PROBLEM — E44.1 MILD PROTEIN-CALORIE MALNUTRITION: Status: ACTIVE | Noted: 2022-07-28

## 2022-07-28 PROBLEM — F44.7 FUNCTIONAL NEUROLOGICAL SYMPTOM DISORDER WITH MIXED SYMPTOMS: Status: ACTIVE | Noted: 2022-07-27

## 2022-07-28 PROBLEM — E04.2 MULTIPLE THYROID NODULES: Status: ACTIVE | Noted: 2022-07-28

## 2022-07-28 PROBLEM — N39.0 UTI (URINARY TRACT INFECTION): Status: ACTIVE | Noted: 2022-07-28

## 2022-07-28 LAB
ALBUMIN SERPL BCP-MCNC: 3.8 G/DL (ref 3.5–5.2)
ALP SERPL-CCNC: 91 U/L (ref 55–135)
ALT SERPL W/O P-5'-P-CCNC: 12 U/L (ref 10–44)
ANION GAP SERPL CALC-SCNC: 12 MMOL/L (ref 8–16)
AST SERPL-CCNC: 12 U/L (ref 10–40)
BACTERIA UR CULT: ABNORMAL
BASOPHILS # BLD AUTO: 0.03 K/UL (ref 0–0.2)
BASOPHILS NFR BLD: 0.2 % (ref 0–1.9)
BILIRUB SERPL-MCNC: 0.3 MG/DL (ref 0.1–1)
BUN SERPL-MCNC: 16 MG/DL (ref 6–20)
CALCIUM SERPL-MCNC: 9.6 MG/DL (ref 8.7–10.5)
CHLORIDE SERPL-SCNC: 102 MMOL/L (ref 95–110)
CO2 SERPL-SCNC: 26 MMOL/L (ref 23–29)
CREAT SERPL-MCNC: 0.6 MG/DL (ref 0.5–1.4)
DIFFERENTIAL METHOD: ABNORMAL
EOSINOPHIL # BLD AUTO: 0.1 K/UL (ref 0–0.5)
EOSINOPHIL NFR BLD: 0.6 % (ref 0–8)
ERYTHROCYTE [DISTWIDTH] IN BLOOD BY AUTOMATED COUNT: 13 % (ref 11.5–14.5)
EST. GFR  (AFRICAN AMERICAN): >60 ML/MIN/1.73 M^2
EST. GFR  (NON AFRICAN AMERICAN): >60 ML/MIN/1.73 M^2
GLUCOSE SERPL-MCNC: 159 MG/DL (ref 70–110)
HCT VFR BLD AUTO: 37.7 % (ref 37–48.5)
HGB BLD-MCNC: 12 G/DL (ref 12–16)
IMM GRANULOCYTES # BLD AUTO: 0.04 K/UL (ref 0–0.04)
IMM GRANULOCYTES NFR BLD AUTO: 0.3 % (ref 0–0.5)
LYMPHOCYTES # BLD AUTO: 3.7 K/UL (ref 1–4.8)
LYMPHOCYTES NFR BLD: 25.2 % (ref 18–48)
MAGNESIUM SERPL-MCNC: 1.7 MG/DL (ref 1.6–2.6)
MCH RBC QN AUTO: 26.5 PG (ref 27–31)
MCHC RBC AUTO-ENTMCNC: 31.8 G/DL (ref 32–36)
MCV RBC AUTO: 83 FL (ref 82–98)
MONOCYTES # BLD AUTO: 0.9 K/UL (ref 0.3–1)
MONOCYTES NFR BLD: 6.1 % (ref 4–15)
NEUTROPHILS # BLD AUTO: 9.8 K/UL (ref 1.8–7.7)
NEUTROPHILS NFR BLD: 67.6 % (ref 38–73)
NRBC BLD-RTO: 0 /100 WBC
PHOSPHATE SERPL-MCNC: 3.4 MG/DL (ref 2.7–4.5)
PLATELET # BLD AUTO: 347 K/UL (ref 150–450)
PMV BLD AUTO: 10 FL (ref 9.2–12.9)
POCT GLUCOSE: 150 MG/DL (ref 70–110)
POCT GLUCOSE: 175 MG/DL (ref 70–110)
POCT GLUCOSE: 230 MG/DL (ref 70–110)
POCT GLUCOSE: 239 MG/DL (ref 70–110)
POTASSIUM SERPL-SCNC: 3.2 MMOL/L (ref 3.5–5.1)
PROT SERPL-MCNC: 7.1 G/DL (ref 6–8.4)
RBC # BLD AUTO: 4.53 M/UL (ref 4–5.4)
SODIUM SERPL-SCNC: 140 MMOL/L (ref 136–145)
WBC # BLD AUTO: 14.46 K/UL (ref 3.9–12.7)

## 2022-07-28 PROCEDURE — 99233 SBSQ HOSP IP/OBS HIGH 50: CPT | Mod: GC,,, | Performed by: PSYCHIATRY & NEUROLOGY

## 2022-07-28 PROCEDURE — 99233 PR SUBSEQUENT HOSPITAL CARE,LEVL III: ICD-10-PCS | Mod: GC,,, | Performed by: PSYCHIATRY & NEUROLOGY

## 2022-07-28 PROCEDURE — 85025 COMPLETE CBC W/AUTO DIFF WBC: CPT

## 2022-07-28 PROCEDURE — 83735 ASSAY OF MAGNESIUM: CPT

## 2022-07-28 PROCEDURE — 97116 GAIT TRAINING THERAPY: CPT | Mod: CQ

## 2022-07-28 PROCEDURE — 63600175 PHARM REV CODE 636 W HCPCS: Performed by: NURSE PRACTITIONER

## 2022-07-28 PROCEDURE — 63600175 PHARM REV CODE 636 W HCPCS

## 2022-07-28 PROCEDURE — 25000003 PHARM REV CODE 250: Performed by: PHYSICIAN ASSISTANT

## 2022-07-28 PROCEDURE — 25000003 PHARM REV CODE 250: Performed by: NURSE PRACTITIONER

## 2022-07-28 PROCEDURE — 97535 SELF CARE MNGMENT TRAINING: CPT

## 2022-07-28 PROCEDURE — 97530 THERAPEUTIC ACTIVITIES: CPT

## 2022-07-28 PROCEDURE — 25000003 PHARM REV CODE 250: Performed by: INTERNAL MEDICINE

## 2022-07-28 PROCEDURE — 84100 ASSAY OF PHOSPHORUS: CPT

## 2022-07-28 PROCEDURE — 80053 COMPREHEN METABOLIC PANEL: CPT

## 2022-07-28 PROCEDURE — 25000003 PHARM REV CODE 250

## 2022-07-28 RX ORDER — SODIUM,POTASSIUM PHOSPHATES 280-250MG
2 POWDER IN PACKET (EA) ORAL
Status: DISCONTINUED | OUTPATIENT
Start: 2022-07-28 | End: 2022-07-28 | Stop reason: HOSPADM

## 2022-07-28 RX ORDER — NITROFURANTOIN 25; 75 MG/1; MG/1
100 CAPSULE ORAL EVERY 12 HOURS
Status: DISCONTINUED | OUTPATIENT
Start: 2022-07-28 | End: 2022-07-28 | Stop reason: HOSPADM

## 2022-07-28 RX ORDER — NITROFURANTOIN 25; 75 MG/1; MG/1
100 CAPSULE ORAL EVERY 12 HOURS
Qty: 6 CAPSULE | Refills: 0 | Status: ON HOLD | OUTPATIENT
Start: 2022-07-28 | End: 2022-08-02 | Stop reason: HOSPADM

## 2022-07-28 RX ORDER — HYDROCODONE BITARTRATE AND ACETAMINOPHEN 7.5; 325 MG/1; MG/1
1 TABLET ORAL EVERY 6 HOURS PRN
Qty: 30 TABLET | Refills: 0 | Status: SHIPPED | OUTPATIENT
Start: 2022-07-28 | End: 2022-08-27

## 2022-07-28 RX ORDER — LANOLIN ALCOHOL/MO/W.PET/CERES
800 CREAM (GRAM) TOPICAL
Status: DISCONTINUED | OUTPATIENT
Start: 2022-07-28 | End: 2022-07-28 | Stop reason: HOSPADM

## 2022-07-28 RX ADMIN — SACUBITRIL AND VALSARTAN 1 TABLET: 24; 26 TABLET, FILM COATED ORAL at 08:07

## 2022-07-28 RX ADMIN — OXYCODONE 5 MG: 5 TABLET ORAL at 02:07

## 2022-07-28 RX ADMIN — ASPIRIN 81 MG CHEWABLE TABLET 81 MG: 81 TABLET CHEWABLE at 08:07

## 2022-07-28 RX ADMIN — GABAPENTIN 600 MG: 300 CAPSULE ORAL at 08:07

## 2022-07-28 RX ADMIN — INSULIN ASPART 2 UNITS: 100 INJECTION, SOLUTION INTRAVENOUS; SUBCUTANEOUS at 08:07

## 2022-07-28 RX ADMIN — CARVEDILOL 6.25 MG: 6.25 TABLET, FILM COATED ORAL at 08:07

## 2022-07-28 RX ADMIN — OXYCODONE 5 MG: 5 TABLET ORAL at 12:07

## 2022-07-28 RX ADMIN — INSULIN ASPART 4 UNITS: 100 INJECTION, SOLUTION INTRAVENOUS; SUBCUTANEOUS at 01:07

## 2022-07-28 RX ADMIN — HEPARIN SODIUM 5000 UNITS: 5000 INJECTION INTRAVENOUS; SUBCUTANEOUS at 06:07

## 2022-07-28 RX ADMIN — POTASSIUM BICARBONATE 35 MEQ: 391 TABLET, EFFERVESCENT ORAL at 12:07

## 2022-07-28 RX ADMIN — NITROFURANTOIN (MONOHYDRATE/MACROCRYSTALS) 100 MG: 75; 25 CAPSULE ORAL at 12:07

## 2022-07-28 RX ADMIN — ATORVASTATIN CALCIUM 40 MG: 40 TABLET, FILM COATED ORAL at 08:07

## 2022-07-28 NOTE — PROGRESS NOTES
"[facility] Neuro Critical Care  Neurocritical Care  Progress Note    Admit Date: 7/27/2022  Service Date: 07/28/2022  Length of Stay: 1    Subjective:     Chief Complaint: Functional neurological symptom disorder with mixed symptoms    History of Present Illness: Ms. Verma is a pleasant 47 y/o f w/ pmhx  of CHF (EF 25%), AICD (placed 2017- not MRI compatible), DM2 (on insulin at home), HTN, Back and R leg pain (seeing acute pain management for thoracic radiculopathy) who presents to [facility] ED for sudden onset LSW and slurred speech. The event began acutely while talking with her son around midnight, she reports she started feeling "funny" her heart started racing and the developed LSW and had difficulty forming words. Upon presentation to ED, patient had NIH of 16, CTH showed no acute hemorrhage, mass, or edema, and CTA revealed no LVO. She was not able to undergo MRI due to her AICD and tpa was administered (stop time 0218 7/27).     Patient to be admitted to [facility] for neuro-monitoring and a higher level of care while in tpa window.     Of note: patient reports her defibrillator has fired once 1 year ago, but not recently. She does endorse a fluttery feeling in her chest and itchy skin when she becomes anxious.         Hospital Course: 07/28/2022: Repeat CTH stable, ASA and DVT ppx started at 6 and 9 AM respectively. Stable from critical care standpoint, discharged to home per vascular neurology.      Interval History:  see above     Review of Systems   Constitutional:  Negative for chills and fever.   HENT:  Negative for sore throat and trouble swallowing.    Eyes:  Negative for photophobia and visual disturbance.   Respiratory:  Negative for cough and shortness of breath.    Cardiovascular:  Negative for chest pain, palpitations and leg swelling.   Gastrointestinal:  Negative for abdominal pain, nausea and vomiting.   Genitourinary:  Negative for difficulty urinating.   Musculoskeletal:  Negative for arthralgias, back " pain and joint swelling.   Skin:  Negative for color change, pallor and rash.   Neurological:  Positive for weakness (LSW improving). Negative for dizziness, tremors, seizures, syncope, facial asymmetry (L, resolved), speech difficulty (Improving drastically since onset.), light-headedness, numbness (LUE, LLE. Improving.) and headaches (global, improving).   Psychiatric/Behavioral:  Negative for agitation and confusion.      Objective:     Vitals:  Temp: 99 °F (37.2 °C)  Pulse: 81  Rhythm: normal sinus rhythm  BP: (!) 114/59  MAP (mmHg): 80  Resp: (!) 21  SpO2: (!) 93 %  O2 Device (Oxygen Therapy): nasal cannula    Temp  Min: 97.7 °F (36.5 °C)  Max: 99 °F (37.2 °C)  Pulse  Min: 60  Max: 106  BP  Min: 98/57  Max: 186/92  MAP (mmHg)  Min: 74  Max: 130  Resp  Min: 12  Max: 56  SpO2  Min: 92 %  Max: 98 %    07/27 0701 - 07/28 0700  In: 220 [P.O.:220]  Out: 300 [Urine:200]   Unmeasured Output  Urine Occurrence: 1  Stool Occurrence: 1       Physical Exam  Constitutional:       Appearance: Normal appearance.   HENT:      Head: Normocephalic and atraumatic.   Eyes:      Extraocular Movements: Extraocular movements intact.      Pupils: Pupils are equal, round, and reactive to light.   Cardiovascular:      Rate and Rhythm: Normal rate and regular rhythm.      Pulses: Normal pulses.      Heart sounds: Normal heart sounds.   Pulmonary:      Effort: Pulmonary effort is normal.      Breath sounds: Normal breath sounds.   Abdominal:      General: Abdomen is flat.      Palpations: Abdomen is soft.   Musculoskeletal:         General: Normal range of motion.      Cervical back: Normal range of motion.   Neurological:      General: No focal deficit present.      Mental Status: She is alert and oriented to person, place, and time.      Sensory: Sensation is intact.      Motor: Weakness present.      Comments: 4/5 strength on the LLE. 5/5 in all other extremities. Sensation intact throughout        Medications:  Continuous  Scheduledaspirin, 81 mg, Daily  atorvastatin, 40 mg, Daily  carvediloL, 6.25 mg, BID  gabapentin, 600 mg, BID  heparin (porcine), 5,000 Units, Q8H  nitrofurantoin (macrocrystal-monohydrate), 100 mg, Q12H  polyethylene glycol, 17 g, Daily  sacubitriL-valsartan, 1 tablet, BID    PRNacetaminophen, 1,000 mg, Q6H PRN  dextrose 10%, 12.5 g, PRN  dextrose 10%, 25 g, PRN  glucagon (human recombinant), 1 mg, PRN  glucose, 16 g, PRN  glucose, 24 g, PRN  hydrALAZINE, 10 mg, Q6H PRN  insulin aspart U-100, 1-10 Units, QID (AC + HS) PRN  labetalol, 10 mg, Q15 Min PRN  magnesium oxide, 800 mg, PRN  magnesium oxide, 800 mg, PRN  ondansetron, 4 mg, Q8H PRN  oxyCODONE, 5 mg, Q6H PRN  potassium bicarbonate, 35 mEq, PRN  potassium bicarbonate, 50 mEq, PRN  potassium bicarbonate, 60 mEq, PRN  potassium, sodium phosphates, 2 packet, PRN  potassium, sodium phosphates, 2 packet, PRN  potassium, sodium phosphates, 2 packet, PRN  sodium chloride 0.9%, 10 mL, PRN      Today I personally reviewed pertinent medications, lines/drains/airways, imaging, cardiology results, laboratory results, microbiology results, notably:    Diet  Diet diabetic [facility]; 1500 Calorie  Diet Cardiac  Diet diabetic [facility]; 1500 Calorie  Diet Cardiac          Assessment/Plan:     Psychiatric  * Functional neurological symptom disorder with mixed symptoms  -Right MCA CVA s/p tPA  -Intial NIH 16 per chart review. NIH on admission 6 at end of tpa infusion.   -Initial CTH w/o mass, hemorrhage, or obvious edema. Unable to have MRI due to AICD incompatibility   -Admit to NCC  -Vascular Neurology consulted, appreciate recs  -Hourly neuro checks and vital signs  -EKG, Echo, and CXR pending  -A1c of 9.5% 1 month ago   -TSH and lipid panel pending  -aPTT, PT/INR WNL   -CBC, CMP, Mag, and phos daily  -SBP goal < 180  -PRN labetalol and hydralazine  -Statin   -SCD; hold DVT ppx in tpa window-> started @ 0900 on 7/28  -CTA on admission w/o LVO   -Repeat CTH  ordered for 2000 7/27   -PT/OT/SLP    Cardiac/Vascular  Mixed hyperlipidemia  -lipid panel pending  -continue home statin     Chronic systolic congestive heart failure  -Echo as below  -EKG pending  -AICD in place, not MRI compatible  -continue home coreg  -restart home entresto as appropriate  -repeat Echo obtained    Echo - March 2022    Interpretation Summary  · The left ventricle is normal in size with severely decreased systolic function. The estimated ejection fraction is 25%.  · There is severe left ventricular global hypokinesis.  · Normal right ventricular size with normal right ventricular systolic function.  · Left ventricular diastolic dysfunction.  · Mild left atrial enlargement.  · Normal central venous pressure (3 mmHg).      Primary hypertension  -SBP goal <180  -PRN labetalol, hydralazine  -continue home coreg     Renal/  UTI (urinary tract infection)  - start nitrofurantoin, tx for 3 days outpt     Endocrine  Diabetes mellitus type 2 in obese  -Hemoglobin A1c 9.5% in June  -POCt glucose 100 on admission   -SSI w/ BG checks ACHS   -continue home gabapentin           The patient is being Prophylaxed for:  Venous Thromboembolism with: Mechanical or Chemical  Stress Ulcer with: None  Ventilator Pneumonia with: none    Activity Orders          Diet diabetic [facility]; 1500 Calorie: Diabetic starting at 07/27 2255    Turn patient starting at 07/27 0400    Elevate HOB starting at 07/27 0205        Full Code    Lucía Daley MD  Neurocritical Care  [facility] - Neuro Critical [facility]

## 2022-07-28 NOTE — PLAN OF CARE
[facility]  Initial Discharge Assessment       Primary Care Provider: Dylan Martinez DO    Admission Diagnosis: CHF (congestive heart failure) [I50.9]  CVA (cerebral vascular accident) [I63.9]  Stroke [I63.9]    Admission Date: 7/27/2022  Expected Discharge Date: 7/28/2022    Discharge Barriers Identified: None    Payor: [employer] MEDICARE ADVANTAGE / Plan: [employer] MCARE ADV / Product Type: Medicare Advantage /     Extended Emergency Contact Information  Primary Emergency Contact: Krish Verma   [place]  Mobile Phone: [phone]  Relation: Spouse    Discharge Plan A: Home with family, Home Health  Discharge Plan B: Home with family      [facility] [address] [facility] [address] [place] [address]  Phone: [phone] Fax: [phone]      Transferred from:  home    Past Medical History:   Diagnosis Date    Anticoagulant long-term use     CHF (congestive heart failure)     Chronic systolic congestive heart failure 3/7/2017    Diabetes mellitus     Diabetes mellitus type 2 in obese 1/6/2016    Hypertension     Primary hypertension 1/6/2016         CM met with patient in room for Discharge Planning Assessment.  Patient is able to answer questions.  Per patient, she  lives with Krish Verma () [phone] and 2 children (17 and 18)in a single story house with 0 step(s) to enter.   Per patient, she was independent with ADLS and used no dme for ambulation.  Patient will have assistance from her  and daughter upon discharge.   Discharge Planning Booklet given to patient/family and discussed.  All questions addressed.  CM will follow for needs.    Home Health discussed and list provided for choices from [facility] website.       Initial Assessment (most recent)     Adult Discharge Assessment - 07/28/22 3416        Discharge Assessment    Assessment Type Discharge Planning Assessment     Confirmed/corrected address, phone number and  insurance Yes     Confirmed Demographics Correct on Facesheet     Source of Information patient     Communicated CARLOS with patient/caregiver Yes     Lives With spouse     Facility Arrived From: ED     Do you expect to return to your current living situation? Yes     Do you have help at home or someone to help you manage your care at home? Yes     Who are your caregiver(s) and their phone number(s)? Krish Verma () [phone]     Prior to hospitilization cognitive status: Alert/Oriented     Current cognitive status: Alert/Oriented     Walking or Climbing Stairs Difficulty none     Dressing/Bathing Difficulty none     Home Accessibility stairs to enter home     Number of Stairs, Main Entrance none     Home Layout Able to live on 1st floor     Equipment Currently Used at Home none     Readmission within 30 days? No     Patient currently being followed by outpatient case management? No     Do you currently have service(s) that help you manage your care at home? No     Do you take prescription medications? Yes     Do you have prescription coverage? Yes     [facility]     Do you have any problems affording any of your prescribed medications? No     Is the patient taking medications as prescribed? yes     Who is going to help you get home at discharge? Krish Verma () [phone]     How do you get to doctors appointments? family or friend will provide     Are you on dialysis? No     Do you take coumadin? No     Discharge Plan A Home with family;Home Health     Discharge Plan B Home with family     DME Needed Upon Discharge  none     Discharge Plan discussed with: Patient     Discharge Barriers Identified None        Relationship/Environment    Name(s) of Who Lives With Patient Krish Verma () [phone]               Sharona Rodriguez RN, CCRN-K, [facility]  Neuro-Critical Care   X [phone]

## 2022-07-28 NOTE — PLAN OF CARE
07/28/22 1611   Post-Acute Status   Post-Acute Authorization Placement;Home Health   Post-Acute Placement Status Set-up Complete/Auth obtained   Home Health Status Set-up Complete/Auth obtained  ([facility])     SW met with Pt at bedside. She reported her preference is [facility].     SW completed referral for [facility] via [facility] and added to the AVS. Per CM, Pt PCP appt also scheduled and added to AVS.    Michelle Akins LCSW  Neurocritical Care   [facility]  [id number]

## 2022-07-28 NOTE — ASSESSMENT & PLAN NOTE
Stroke risk factor  A1c 9.5% 1 month prior  BG goals inpatient 140-180, has been at goal this admission

## 2022-07-28 NOTE — PLAN OF CARE
[facility] Care Plan    POC reviewed with Samaria Verma and family at 1400. Pt verbalized understanding. Questions and concerns addressed. No acute events today. Pt progressing toward goals. Will continue to monitor. See below and flowsheets for full assessment and VS info.             Is this a stroke patient? yes- Stroke booklet reviewed with patient and family, risk factors identified for patient and stroke booklet remains at bedside for ongoing education.     Neuro:  [place] Coma Scale  Best Eye Response: 4-->(E4) spontaneous  Best Motor Response: 6-->(M6) obeys commands  Best Verbal Response: 5-->(V5) oriented  [place] Coma Scale Score: 15  Assessment Qualifiers: no eye obstruction present  Pupil PERRLA: yes     24 hr Temp:  [97.7 °F (36.5 °C)-99 °F (37.2 °C)]     CV:   Rhythm: normal sinus rhythm  BP goals:   SBP < 180  MAP > 65    Resp:   O2 Device (Oxygen Therapy): nasal cannula       Plan: N/A    GI/:     Diet/Nutrition Received: regular  Last Bowel Movement: 07/28/22       Intake/Output Summary (Last 24 hours) at 7/28/2022 1714  Last data filed at 7/28/2022 1201  Gross per 24 hour   Intake 456 ml   Output 300 ml   Net 156 ml     Unmeasured Output  Urine Occurrence: 1  Stool Occurrence: 1    Labs/Accuchecks:  Recent Labs   Lab 07/28/22  0340   WBC 14.46*   RBC 4.53   HGB 12.0   HCT 37.7         Recent Labs   Lab 07/28/22  0340      K 3.2*   CO2 26      BUN 16   CREATININE 0.6   ALKPHOS 91   ALT 12   AST 12   BILITOT 0.3      Recent Labs   Lab 07/27/22  0030   INR 1.1    No results for input(s): CPK, CPKMB, TROPONINI, MB in the last 168 hours.    Electrolytes: Electrolytes replaced  Accuchecks: ACHS    Gtts:      LDA/Wounds:  Lines/Drains/Airways       Peripheral Intravenous Line  Duration                  Peripheral IV - Single Lumen 07/27/22 0016 20 G Left Antecubital 1 day         Peripheral IV - Single Lumen 07/27/22 0110 20 G Right Forearm 1 day                  Wounds: No  Wound care  consulted: No

## 2022-07-28 NOTE — PLAN OF CARE
[facility] Care Plan    POC reviewed with Samaria Verma at 0300. Pt verbalized understanding. Questions and concerns addressed. No acute events overnight. Pt progressing toward goals. Will continue to monitor. See below and flowsheets for full assessment and VS info.     -CT completed.  -Post tPA monitoring completed.  -Post tPA NIHSS = 2.  -PRN oxy given x2 for pain.  -2 L NC applied for <94 while sleeping.      Is this a stroke patient? yes- Stroke booklet reviewed with patient, risk factors identified for patient and stroke booklet remains at bedside for ongoing education.     Neuro:  [place] Coma Scale  Best Eye Response: 3-->(E3) to speech  Best Motor Response: 6-->(M6) obeys commands  Best Verbal Response: 5-->(V5) oriented  [place] Coma Scale Score: 14  Assessment Qualifiers: no eye obstruction present, patient not sedated/intubated  Pupil PERRLA: yes     24hr Temp:  [98.4 °F (36.9 °C)-98.8 °F (37.1 °C)]     CV:   Rhythm: normal sinus rhythm  BP goals:   SBP < 180  MAP > 65    Resp:   O2 Device (Oxygen Therapy): nasal cannula       Plan: N/A    GI/:     Diet/Nutrition Received: NPO  Last Bowel Movement: 07/25/22       Intake/Output Summary (Last 24 hours) at 7/28/2022 0432  Last data filed at 7/28/2022 0118  Gross per 24 hour   Intake 220 ml   Output 300 ml   Net -80 ml          Labs/Accuchecks:  Recent Labs   Lab 07/28/22  0340   WBC 14.46*   RBC 4.53   HGB 12.0   HCT 37.7         Recent Labs   Lab 07/28/22  0340      K 3.2*   CO2 26      BUN 16   CREATININE 0.6   ALKPHOS 91   ALT 12   AST 12   BILITOT 0.3      Recent Labs   Lab 07/27/22  0030   INR 1.1    No results for input(s): CPK, CPKMB, TROPONINI, MB in the last 168 hours.    Electrolytes: {electrolyte replacement:88551}  Accuchecks: {accuchecks:92809}    Gtts:      LDA/Wounds:  Lines/Drains/Airways       Peripheral Intravenous Line  Duration                  Peripheral IV - Single Lumen 07/27/22 0016 20 G Left Antecubital 1 day          Peripheral IV - Single Lumen 07/27/22 0110 20 G Right Forearm 1 day                  Wounds: Yes  Wound care consulted: No

## 2022-07-28 NOTE — ASSESSMENT & PLAN NOTE
Patient is a 46 y.o. female with PMHx CHF (EF25%) s/p AICD, HTN, DM and HLD. 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 CTA MP obtained on arrival for stroke code protocol, negative for LVO and high grade stenosis. Unable to get MRI due to incompatible AICD. No contraindication for tpa, given in ED. Patient was admitted to [facility] for post tpa monitoring and repeat imaging. Repeat CTH performed and negative for LVO/high grade stenosis. No MRI as AICD incompatible. TTE with EF 44% with mild LAE and LVDD. Exam with fluctuating functional neuro findings over the course of a few hours (LSW, L numbness, R numbness, R facial droop, RSW, inability to move neck/shoulders, and HA). Continue ASA and atorvastatin. Going home with HH OT/PT. Can follow up in OP setting. Patient understands and agrees to plan after thorough explanation and combined decision making between care team and patient.       Antithrombotics for secondary stroke prevention:   - Antiplatelets: Aspirin: 81 mg daily    Statins for secondary stroke prevention and hyperlipidemia, if present:   - Statins: Atorvastatin- 40 mg daily    Aggressive risk factor modification:   - HTN, DM, HLD, Diet, Exercise, Obesity     Rehab efforts:   - The patient has been evaluated by a stroke team provider and the therapy needs have been fully considered based off the presenting complaints and exam findings. The following therapy evaluations are needed: PT evaluate and treat, OT evaluate and treat, SLP evaluate and treat, PM&R evaluate for appropriate placement---Home Health     Diagnostics ordered/pending:   - None     VTE prophylaxis:   - Heparin 5000 units SQ every 8 hours  Mechanical prophylaxis: Place SCDs    BP parameters:   - Infarct: Post tPA, SBP <180

## 2022-07-28 NOTE — ASSESSMENT & PLAN NOTE
Bilateral thyroid nodules up to 1cm on CTA   Non-emergent US recommended   Have patient follow up with PCP

## 2022-07-28 NOTE — PROGRESS NOTES
[facility] Critical [facility]  Vascular Neurology  Comprehensive Stroke Center  Progress Note    Assessment/Plan:     * Functional neurological symptom disorder with mixed symptoms  Patient is a 46 y.o. female with PMHx CHF (EF25%) s/p AICD, HTN, DM and HLD. 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 CTA MP obtained on arrival for stroke code protocol, negative for LVO and high grade stenosis. Unable to get MRI due to incompatible AICD. No contraindication for tpa, given in ED. Patient was admitted to [facility] for post tpa monitoring and repeat imaging. Repeat CTH performed and negative for LVO/high grade stenosis. No MRI as AICD incompatible. TTE with EF 44% with mild LAE and LVDD. Exam with fluctuating functional neuro findings over the course of a few hours (LSW, L numbness, R numbness, R facial droop, RSW, inability to move neck/shoulders, and HA). Continue ASA and atorvastatin. Going home with HH OT/PT. Can follow up in OP setting. Patient understands and agrees to plan after thorough explanation and combined decision making between care team and patient.       Antithrombotics for secondary stroke prevention:   - Antiplatelets: Aspirin: 81 mg daily    Statins for secondary stroke prevention and hyperlipidemia, if present:   - Statins: Atorvastatin- 40 mg daily    Aggressive risk factor modification:   - HTN, DM, HLD, Diet, Exercise, Obesity     Rehab efforts:   - The patient has been evaluated by a stroke team provider and the therapy needs have been fully considered based off the presenting complaints and exam findings. The following therapy evaluations are needed: PT evaluate and treat, OT evaluate and treat, SLP evaluate and treat, PM&R evaluate for appropriate placement---Home Health     Diagnostics ordered/pending:   - None     VTE prophylaxis:   - Heparin 5000 units SQ every 8 hours  Mechanical prophylaxis: Place SCDs    BP parameters:   - Infarct: Post tPA, SBP  <180    Multiple thyroid nodules  Bilateral thyroid nodules up to 1cm on CTA   Non-emergent US recommended   Have patient follow up with PCP     RUQ abdominal tenderness  Recent admission for laproscopic cholecystectomy (6/8/2022 - 6/9/2022) in setting of cholelithiasis.     Mixed hyperlipidemia  Previous Lipid panel 1 year prior with elevated cholesterol, triglycerides, and LDL  Stroke risk factor    Continue home atorvastatin 40 mg LDL goal < 70  LDL this admission 70.6     Chronic systolic congestive heart failure  03/16/2022: ECHO EF 25% with severe left ventricular global hypokinesis and mild left atrial enlargement.   Repeat ECHO with the following (7/27/22):   ·   The left ventricle is normal in size with mild eccentric hypertrophy and mildly decreased systolic function.  · The estimated ejection fraction is 45-50%.  · Left ventricular diastolic dysfunction.  · Normal right ventricular size with normal right ventricular systolic function.  · Mild left atrial enlargement.  · The quantitatively derived ejection fraction is 44%.  · There is abnormal septal wall motion consistent with right ventricular pacemaker.        Primary hypertension  Stroke risk factor  Infarct: Post tPA, SBP <180  On Coreg 6.25mg BID and Entresto BID       Diabetes mellitus type 2 in obese  Stroke risk factor  A1c 9.5% 1 month prior  BG goals inpatient 140-180, has been at goal this admission          07/28/2022  Repeated CTH last night, negative for acute intracranial process. Therapy recommendations for HH PT/OT. Patient in NCC s/p tpa monitoring. Still reporting LSN and mild LSW (LLE). UA positive for 3+ leukocytes, denies dysuria but will treat with PO abx. WBC 14. Currently on ASA 81mg and atorvastatin 40mg, okay to continue. Patient with fluctuating functional neuro exam. Extremely tearful on exam when rounded in afternoon. C/o R numbness and inability to move neck/shoulders to primary team. Patient able to look side to side and  lift arms. Discussed PT/OT recommendations with patient. Discussed patient comfort level prior to dropping discharge orders. Decision to made to go home with HH and follow up outpatient. Will be discharged home with home health and OP follow up.       STROKE DOCUMENTATION   Acute Stroke Times   Last Known Normal Date: 07/26/22  Last Known Normal Time: 2200  Symptom Onset Date: 07/27/22  Symptom Onset Time: 2200  Stroke Team Called Date: 07/27/22  Stroke Team Called Time: 0010  Stroke Team Arrival Date: 07/27/22  Stroke Team Arrival Time: 0012  CT Interpretation Time: 0040  Alteplase Recommended: Yes  CTA Interpretation Time: 0047  Thrombectomy Recommended: No  MRI Acute Stroke Protocol Interpretation Time:  (unable to obtain due to pacemaker)  Decision to Treat Time for Alteplase: 0056    NIH Scale:  1a. Level of Consciousness: 0-->Alert, keenly responsive  1b. LOC Questions: 0-->Answers both questions correctly  1c. LOC Commands: 0-->Performs both tasks correctly  2. Best Gaze: 0-->Normal  3. Visual: 0-->No visual loss  4. Facial Palsy: 0-->Normal symmetrical movements  5a. Motor Arm, Left: 0-->No drift, limb holds 90 (or 45) degrees for full 10 secs  5b. Motor Arm, Right: 0-->No drift, limb holds 90 (or 45) degrees for full 10 secs  6a. Motor Leg, Left: 1-->Drift, leg falls by the end of the 5-sec period but does not hit bed  6b. Motor Leg, Right: 0-->No drift, leg holds 30 degree position for full 5 secs  7. Limb Ataxia: 0-->Absent  8. Sensory: 1-->Mild-to-moderate sensory loss, patient feels pinprick is less sharp or is dull on the affected side, or there is a loss of superficial pain with pinprick, but patient is aware of being touched  9. Best Language: 0-->No aphasia, normal  10. Dysarthria: 0-->Normal  11. Extinction and Inattention (formerly Neglect): 0-->No abnormality  Total (NIH Stroke Scale): 2       Modified [place] Score: 1  [place] Coma Scale:    ABCD2 Score:    [id number] Score:   HAS -BLED Score:    ICH Score:   Hunt & Pinedo Classification:      Hemorrhagic change of an Ischemic Stroke: Does this patient have an ischemic stroke with hemorrhagic changes? No     Neurologic Chief Complaint: R MCA syndrome     Subjective:     Interval History: Patient is seen for follow-up neurological assessment and treatment recommendations:     Repeated CTH last night, negative for acute intracranial process. Therapy recommendations for HH PT/OT. Patient in NCC s/p tpa monitoring. Still reporting LSN and mild LSW (LLE). UA positive for 3+ leukocytes, denies dysuria but will treat with PO abx. WBC 14. Currently on ASA 81mg and atorvastatin 40mg, okay to continue. Patient with fluctuating functional neuro exam. Extremely tearful on exam when rounded in afternoon. C/o R numbness and inability to move neck/shoulders to primary team. Patient able to look side to side and lift arms. Discussed PT/OT recommendations with patient. Discussed patient comfort level prior to dropping discharge orders. Decision to made to go home with HH and follow up outpatient. Will be discharged home with home health and OP follow up.      HPI, Past Medical, Family, and Social History remains the same as documented in the initial encounter.     Review of Systems   Constitutional:  Negative for diaphoresis and fever.   HENT:  Negative for drooling and rhinorrhea.    Eyes:  Negative for visual disturbance.   Respiratory:  Negative for cough, choking and shortness of breath.    Gastrointestinal:  Negative for diarrhea and vomiting.   Genitourinary:  Negative for dysuria.   Neurological:  Positive for weakness and numbness. Negative for facial asymmetry, speech difficulty and headaches.   Psychiatric/Behavioral:  Negative for agitation, behavioral problems and confusion. The patient is not nervous/anxious.    Scheduled Meds:   aspirin  81 mg Oral Daily    atorvastatin  40 mg Oral Daily    carvediloL  6.25 mg Oral BID    gabapentin  600 mg Oral BID     heparin (porcine)  5,000 Units Subcutaneous Q8H    nitrofurantoin (macrocrystal-monohydrate)  100 mg Oral Q12H    polyethylene glycol  17 g Oral Daily    sacubitriL-valsartan  1 tablet Oral BID     Continuous Infusions:  PRN Meds:acetaminophen, dextrose 10%, dextrose 10%, glucagon (human recombinant), glucose, glucose, hydrALAZINE, insulin aspart U-100, labetalol, magnesium oxide, magnesium oxide, ondansetron, oxyCODONE, potassium bicarbonate, potassium bicarbonate, potassium bicarbonate, potassium, sodium phosphates, potassium, sodium phosphates, potassium, sodium phosphates, sodium chloride 0.9%    Objective:     Vital Signs (Most Recent):  Temp: 97.8 °F (36.6 °C) (07/28/22 1101)  Pulse: 73 (07/28/22 1301)  Resp: 19 (07/28/22 1301)  BP: (!) 107/54 (07/28/22 1301)  SpO2: 96 % (07/28/22 1301)  BP Location: Right arm    Vital Signs Range (Last 24H):  Temp:  [97.7 °F (36.5 °C)-98.8 °F (37.1 °C)]   Pulse:  [60-76]   Resp:  [12-30]   BP: ()/(54-81)   SpO2:  [92 %-97 %]   BP Location: Right arm    Physical Exam  Vitals and nursing note reviewed.   Constitutional:       Appearance: She is not ill-appearing or diaphoretic.   HENT:      Head: Normocephalic and atraumatic.      Nose: No rhinorrhea.   Eyes:      General: No scleral icterus.     Extraocular Movements: Extraocular movements intact.   Cardiovascular:      Rate and Rhythm: Normal rate.   Pulmonary:      Effort: Pulmonary effort is normal. No respiratory distress.   Musculoskeletal:         General: No tenderness.   Skin:     General: Skin is warm and dry.   Neurological:      Mental Status: She is alert and oriented to person, place, and time.      Sensory: Sensory deficit present.      Motor: Weakness present.       Neurological Exam:   LOC: alert  Attention Span: Good   Language: No aphasia  Articulation: No dysarthria  Orientation: Person, Place, Time   Visual Fields: Full  EOM (CN III, IV, VI): Full/intact  Facial Movement (CN VII): Symmetric facial  expression    Motor: Arm left  Normal 5/5  Leg left  Paresis: 4/5  Arm right  Normal 5/5  Leg right Normal 5/5  Sensation: Carmine-hypoesthesia left    Laboratory:  CMP:   Recent Labs   Lab 07/28/22  0340   CALCIUM 9.6   ALBUMIN 3.8   PROT 7.1      K 3.2*   CO2 26      BUN 16   CREATININE 0.6   ALKPHOS 91   ALT 12   AST 12   BILITOT 0.3     BMP:   Recent Labs   Lab 07/28/22  0340      K 3.2*      CO2 26   BUN 16   CREATININE 0.6   CALCIUM 9.6     CBC:   Recent Labs   Lab 07/28/22  0340   WBC 14.46*   RBC 4.53   HGB 12.0   HCT 37.7      MCV 83   MCH 26.5*   MCHC 31.8*     Lipid Panel:   Recent Labs   Lab 07/27/22  0336   CHOL 183   LDLCALC 70.6   HDL 35*   TRIG 387*     Coagulation:   Recent Labs   Lab 07/27/22  0030   INR 1.1     Platelet Aggregation Study: No results for input(s): PLTAGG, PLTAGINTERP, PLTAGREGLACO, ADPPLTAGGREG in the last 168 hours.  Hgb A1C: No results for input(s): HGBA1C in the last 168 hours.  TSH:   Recent Labs   Lab 07/27/22  0336   TSH 1.692       Diagnostic Results     Brain Imaging   CTH 7/27/22 22:21   No acute intracranial process.    Vessel Imaging   CTA  7/27/22 00:38   No acute abnormality. No high-grade stenosis or major vessel occlusion.     Bilateral thyroid nodules measuring up to 1.0 cm.  Consider nonemergent thyroid ultrasound for further evaluation when clinically appropriate.    Cardiac Imaging   TTE 7/27/22   · The left ventricle is normal in size with mild eccentric hypertrophy and mildly decreased systolic function.  · The estimated ejection fraction is 45-50%.  · Left ventricular diastolic dysfunction.  · Normal right ventricular size with normal right ventricular systolic function.  · Mild left atrial enlargement.  · The quantitatively derived ejection fraction is 44%.  · There is abnormal septal wall motion consistent with right ventricular pacemaker.        Omega Guzman PA-C  [facility]  Department of Vascular Neurology    [facility]

## 2022-07-28 NOTE — NURSING
Discharge instructions, home medications, and signs and symptoms to be readmitted reviewed with pt,  Krish, and sister. Vitals stable, neuro stable. Iv's removed and all questions answered. Pt transferred to car for transport home by wheelchair by RN.

## 2022-07-28 NOTE — PLAN OF CARE
[facility] - Neuro Critical Care  Discharge Final Note    Primary Care Provider: Dylan Martinez DO    Expected Discharge Date: 7/28/2022     Patient discharged to home with [facility]     Final Discharge Note (most recent)     Final Note - 07/28/22 1635        Final Note    Assessment Type Final Discharge Note     Anticipated Discharge Disposition Home-Health Care [facility]     What phone number can be called within the next 1-3 days to see how you are doing after discharge? [phone]     Hospital Resources/Appts/Education Provided Appointments scheduled and added to AVS                   Future Appointments   Date Time Provider Department Center   8/8/2022  2:40 PM Dylan Martinez DO [facility] FAM MED [place]   8/15/2022  1:15 PM Randall Ellington MD [facility] CARDIO [place]   10/15/2022 10:00 AM HOME MONITOR DEVICE CHECK, [facility] CARDADAM [place]              Contact Info     Dylan Martinez DO   Specialty: Internal Medicine   Relationship: PCP - General    [address] [place] [address]   Phone: [phone]       Next Steps: Go on 8/8/2022    Instructions: Hospital follow up at 2:40 pm.  Please wear a mask    [facility] VASCULAR NEUROLOGY   Specialty: Vascular Neurology    [address] [address]   Phone: [phone]       Next Steps: Follow up in 1 week(s)    [facility]   Specialty: Home Health Services    [address] [address]   Phone: [phone]       Next Steps: Go to    Instructions: Home Health- they will call for follow up appt within 24 hours of dc          Sharona Rodriguez RN, CCRN-K, [facility]  Neuro-Critical Care   [phone]

## 2022-07-28 NOTE — ASSESSMENT & PLAN NOTE
03/16/2022: ECHO EF 25% with severe left ventricular global hypokinesis and mild left atrial enlargement.   Repeat ECHO with the following (7/27/22):   ·   The left ventricle is normal in size with mild eccentric hypertrophy and mildly decreased systolic function.  · The estimated ejection fraction is 45-50%.  · Left ventricular diastolic dysfunction.  · Normal right ventricular size with normal right ventricular systolic function.  · Mild left atrial enlargement.  · The quantitatively derived ejection fraction is 44%.  · There is abnormal septal wall motion consistent with right ventricular pacemaker.

## 2022-07-28 NOTE — PLAN OF CARE
[facility] Neuro Critical Care      HOME HEALTH ORDERS  FACE TO FACE ENCOUNTER    Patient Name: Samaria Verma  YOB: 1975    PCP: Dylan Martinez DO   PCP Address: [address] / [place] [address]  PCP Phone Number: [phone]  PCP Fax: [phone]    Encounter Date: 7/27/22    Admit to Home Health    Diagnoses:  Active Hospital Problems    Diagnosis  POA    *Stroke due to occlusion of right middle cerebral artery [I63.511]  Yes    RUQ abdominal tenderness [R10.811]  Unknown    Mixed hyperlipidemia [E78.2]  Yes    Chronic systolic congestive heart failure [I50.22]  Yes     Chronic    Diabetes mellitus type 2 in obese [E11.69, E66.9]  Yes     Chronic    Primary hypertension [I10]  Yes     Chronic      Resolved Hospital Problems   No resolved problems to display.       Follow Up Appointments:  Future Appointments   Date Time Provider Department Center   8/15/2022  1:15 PM Randall Ellington MD [facility] CARDIO [place]   10/15/2022 10:00 AM HOME MONITOR DEVICE CHECK, [facility] CARDADAM [place]       Allergies:  Review of patient's allergies indicates:   Allergen Reactions    Lisinopril Rash       Medications: Review discharge medications with patient and family and provide education.    Current Facility-Administered Medications   Medication Dose Route Frequency Provider Last Rate Last Admin    acetaminophen tablet 1,000 mg  1,000 mg Oral Q6H PRN Lucía Daley MD   1,000 mg at 07/27/22 1506    aspirin chewable tablet 81 mg  81 mg Oral Daily Troy Cornell NP   81 mg at 07/28/22 0818    atorvastatin tablet 40 mg  40 mg Oral Daily Sophia Cherry PA-C   40 mg at 07/28/22 0818    carvediloL tablet 6.25 mg  6.25 mg Oral BID Sophia Cherry PA-C   6.25 mg at 07/28/22 0818    dextrose 10% bolus 125 mL  12.5 g Intravenous PRN Sophia Cherry PA-C        dextrose 10% bolus 250 mL  25 g Intravenous PRN Sophia Cherry PA-C        gabapentin capsule 600 mg  600 mg Oral BID Sophia Cherry PA-C    600 mg at 07/28/22 0819    glucagon (human recombinant) injection 1 mg  1 mg Intramuscular PRN Sophia Cherry PA-C        glucose chewable tablet 16 g  16 g Oral PRN Sophia Cherry PA-C        glucose chewable tablet 24 g  24 g Oral PRN Sophia MAK Cherry PA-C        heparin (porcine) injection 5,000 Units  5,000 Units Subcutaneous Q8H Troy Cornell, NP   5,000 Units at 07/28/22 0650    hydrALAZINE injection 10 mg  10 mg Intravenous Q6H PRN Sophia Cherry PA-C        insulin aspart U-100 pen 1-10 Units  1-10 Units Subcutaneous QID (AC + HS) PRN Sophia Cherry PA-C   4 Units at 07/28/22 1336    labetalol 20 mg/4 mL (5 mg/mL) IV syring  10 mg Intravenous Q15 Min PRN Sophia Cherry PA-C        magnesium oxide tablet 800 mg  800 mg Oral PRN Reema G. JeseniapicYVON sun        magnesium oxide tablet 800 mg  800 mg Oral PRN Reema G. JeseniapiccoYVON ba        nitrofurantoin (macrocrystal-monohydrate) 100 MG capsule 100 mg  100 mg Oral Q12H Reema GYUMI Allred-C   100 mg at 07/28/22 1214    ondansetron injection 4 mg  4 mg Intravenous Q8H PRN Sophia Cherry PA-C   4 mg at 07/27/22 1506    oxyCODONE immediate release tablet 5 mg  5 mg Oral Q6H PRN Troy Cornell, NP   5 mg at 07/28/22 1214    polyethylene glycol packet 17 g  17 g Oral Daily Sophiajamshid Cherry PA-C        potassium bicarbonate disintegrating tablet 35 mEq  35 mEq Oral PRN Reema G. Lopiccojesenia PA-C   35 mEq at 07/28/22 1214    potassium bicarbonate disintegrating tablet 50 mEq  50 mEq Oral PRN Reema G. Lopiccolo, PA-C        potassium bicarbonate disintegrating tablet 60 mEq  60 mEq Oral PRN Reema G. Lopiccojesenia, PA-C        potassium, sodium phosphates 280-160-250 mg packet 2 packet  2 packet Oral PRN Reema G. Lopiccolo, PA-C        potassium, sodium phosphates 280-160-250 mg packet 2 packet  2 packet Oral PRN Reema Ramírez PA-C        potassium, sodium phosphates 280-160-250 mg packet 2 packet  2 packet Oral PRN Reema  GOLD Ramírez PA-C        sacubitriL-valsartan 24-26 mg per tablet 1 tablet  1 tablet Oral BID Sonny Parr MD   1 tablet at 07/28/22 0819    sodium chloride 0.9% flush 10 mL  10 mL Intravenous PRN Sophai Cherry PA-C         Current Discharge Medication List      START taking these medications    Details   nitrofurantoin, macrocrystal-monohydrate, (MACROBID) 100 MG capsule Take 1 capsule (100 mg total) by mouth every 12 (twelve) hours. for 3 days  Qty: 6 capsule, Refills: 0         CONTINUE these medications which have NOT CHANGED    Details   aspirin (ECOTRIN) 81 MG EC tablet Take 1 tablet (81 mg total) by mouth once daily.  Qty: 30 tablet, Refills: 11      atorvastatin (LIPITOR) 40 MG tablet Take 1 tablet (40 mg total) by mouth once daily.  Qty: 90 tablet, Refills: 3    Comments: Bedside delivery      blood sugar diagnostic Strp Use to test blood glucose 4 (four) times daily before meals and nightly.  Qty: 200 each, Refills: 11      blood-glucose sensor (DEXCOM G5-G4 SENSOR) Bhumika Apply 1 sensor every 10 days  Qty: 9 each, Refills: 3    Associated Diagnoses: Type 2 diabetes mellitus with hyperglycemia, with long-term current use of insulin      blood-glucose transmitter (DEXCOM G5 TRANSMITTER) Bhumika Replace every 90 days  Qty: 1 each, Refills: 3    Associated Diagnoses: Type 2 diabetes mellitus with hyperglycemia, with long-term current use of insulin      carvediloL (COREG) 6.25 MG tablet Take 1 tablet (6.25 mg total) by mouth 2 (two) times daily.  Qty: 60 tablet, Refills: 11    Comments: .      furosemide (LASIX) 40 MG tablet Take 1 tablet (40 mg total) by mouth 2 (two) times daily.  Qty: 30 tablet, Refills: 1    Associated Diagnoses: NICM (nonischemic cardiomyopathy)      gabapentin (NEURONTIN) 300 MG capsule Take 2 capsules (600 mg total) by mouth 2 (two) times daily.  Qty: 120 capsule, Refills: 3    Associated Diagnoses: Lumbar radiculopathy      !! insulin aspart U-100 (NOVOLOG FLEXPEN U-100 INSULIN)  "100 unit/mL (3 mL) InPn pen Inject 15 Units into the skin 4 (four) times daily with meals and nightly. Plus sliding scale, max of 90 units per day  Qty: 81 mL, Refills: 1    Associated Diagnoses: Type 2 diabetes mellitus with hyperglycemia, with long-term current use of insulin      !! insulin aspart U-100 (NOVOLOG FLEXPEN U-100 INSULIN) 100 unit/mL (3 mL) InPn pen 15 units with meals plus ss, or max daily dose of 100 units.  Qty: 30 mL, Refills: 6    Associated Diagnoses: Type 2 diabetes mellitus with complication, with long-term current use of insulin      insulin degludec (TRESIBA FLEXTOUCH U-200) 200 unit/mL (3 mL) insulin pen Inject 100 Units into the skin every evening.  Qty: 5 pen, Refills: 6    Associated Diagnoses: Type 2 diabetes mellitus with complication, with long-term current use of insulin      insulin syringe-needle U-100 (BD INSULIN SYRINGE ULTRA-FINE) 1 mL 31 gauge x 5/16 Syrg Use to inject insulin 5 times daily  Qty: 100 each, Refills: 11      lancets (ACCU-CHEK SOFTCLIX LANCETS) Misc Use to check blood glucose 4 (four) times daily before meals and nightly.  Qty: 200 each, Refills: 5      nitroGLYCERIN (NITROSTAT) 0.4 MG SL tablet Place 1 tablet (0.4 mg total) under the tongue every 5 (five) minutes as needed for Chest pain.  Qty: 25 tablet, Refills: 0      pen needle, diabetic 31 gauge x 5/16" Ndle Use to inject insulin 4 times daily  Qty: 300 each, Refills: 3    Associated Diagnoses: Type 2 diabetes mellitus with hyperglycemia, with long-term current use of insulin      sacubitriL-valsartan (ENTRESTO) 24-26 mg per tablet Take 1 tablet by mouth 2 (two) times daily.  Qty: 180 tablet, Refills: 3       !! - Potential duplicate medications found. Please discuss with provider.      STOP taking these medications       albuterol (PROVENTIL/VENTOLIN HFA) 90 mcg/actuation inhaler Comments:   Reason for Stopping:         azelastine (ASTELIN) 137 mcg (0.1 %) nasal spray Comments:   Reason for Stopping:      "    betamethasone dipropionate 0.05 % cream Comments:   Reason for Stopping:         blood-glucose meter (ACCU-CHEK GUIDE GLUCOSE METER) Misc Comments:   Reason for Stopping:         blood-glucose meter,continuous (DEXCOM ) Misc Comments:   Reason for Stopping:         clonazePAM (KLONOPIN) 0.5 MG tablet Comments:   Reason for Stopping:         famotidine (PEPCID) 20 MG tablet Comments:   Reason for Stopping:         HYDROcodone-acetaminophen (NORCO) 7.5-325 mg per tablet Comments:   Reason for Stopping:         methocarbamoL (ROBAXIN) 500 MG Tab Comments:   Reason for Stopping:         ondansetron (ZOFRAN) 4 MG tablet Comments:   Reason for Stopping:                 I have seen and examined this patient within the last 30 days. My clinical findings that support the need for the home health skilled services and home bound status are the following:no   Weakness/numbness causing balance and gait disturbance due to Stroke making it taxing to leave home.     Diet:   regular diet    Labs:  Per facility protocl     Referrals/ Consults  Physical Therapy to evaluate and treat. Evaluate for home safety and equipment needs; Establish/upgrade home exercise program. Perform / instruct on therapeutic exercises, gait training, transfer training, and Range of Motion.  Occupational Therapy to evaluate and treat. Evaluate home environment for safety and equipment needs. Perform/Instruct on transfers, ADL training, ROM, and therapeutic exercises.    Activities:   activity as tolerated    Nursing:   Agency to admit patient within 24 hours of hospital discharge unless specified on physician order or at patient request    SN to complete comprehensive assessment including routine vital signs. Instruct on disease process and s/s of complications to report to MD. Review/verify medication list sent home with the patient at time of discharge  and instruct patient/caregiver as needed. Frequency may be adjusted depending on start of  care date.     Skilled nurse to perform up to 3 visits PRN for symptoms related to diagnosis    Notify MD if SBP > 160 or < 90; DBP > 90 or < 50; HR > 120 or < 50; Temp > 101; O2 < 88%    Ok to schedule additional visits based on staff availability and patient request on consecutive days within the home health episode.    When multiple disciplines ordered:    Start of Care occurs on Sunday - Wednesday schedule remaining discipline evaluations as ordered on separate consecutive days following the start of care.    Thursday SOC -schedule subsequent evaluations Friday and Monday the following week.     Friday - Saturday SOC - schedule subsequent discipline evaluations on consecutive days starting Monday of the following week.    For all post-discharge communication and subsequent orders please contact patient's primary care physician.    Miscellaneous   Per facility protocol     Home Health Aide:  Physical Therapy Other: 4x week  and Occupational Therapy Other: 4x week     Wound Care Orders  no    I certify that this patient is confined to her home and needs physical therapy and occupational therapy.

## 2022-07-28 NOTE — CONSULTS
[facility] - Neuro Critical Care  Adult Nutrition  Consult Note    SUMMARY     Recommendations    1. Continue current diabetic diet- encourage adequate PO intake.    - If PO intake %, add cardiac diet restrictions.     2. Add Boost Glucose TID- pt prefers strawberry only.    - If PO intake 50%, decrease Boost Glucose to BID.     3. Consider adding appetite stimulant.     4. RD following.    Goals: Will meet % EEN/EPN by next RD f/u.  Nutrition Goal Status: new  Communication of RD Recs:  (POC)    Assessment and Plan    Non-Severe Protein-Calorie Malnutrition    Nutrition Problem  Non-severe (moderate) malnutrition    Related to (etiology):   Decreased appetite x 1 month and issues with diarrhea    Signs and Symptoms (as evidenced by):   Energy Intake: less than 75% of estimated energy requirement for greater than 7 days  Body Fat Depletion: mild depletion of buccal and orbital regions  Muscle Mass Depletion: mild depletion of temporal region    Interventions/Recommendations (treatment strategy):  Collaboration of nutrition care with other providers  [employer]    Nutrition Diagnosis Status:   New     Malnutrition Assessment  Malnutrition Type: acute illness or injury  Energy Intake: moderate energy intake      Energy Intake (Malnutrition): less than 75% for greater than 7 days  Subcutaneous Fat (Malnutrition): mild depletion   Orbital Region (Subcutaneous Fat Loss): mild depletion  Upper Arm Region (Subcutaneous Fat Loss): well nourished   [place] Region (Muscle Loss): mild depletion  Clavicle and Acromion Bone Region (Muscle Loss): well nourished  Posterior Calf Region (Muscle Loss): well nourished       Subcutaneous Fat Loss (Final Summary): mild protein-calorie malnutrition       Reason for Assessment    Reason For Assessment: consult  Diagnosis: stroke/CVA  Relevant Medical History: T2DM, HLD, HTN, CHF  Interdisciplinary Rounds: did not attend  General Information Comments: RD consulted for  "DM education and CVA. Spoke with pt at bedside. States intake PTA 0-25% d/t decrease appetite x 1 month. States intake now 0% d/t continued decreased appetite and issues with diarrhea. Eagerly agreed to receiving ONS TID. Issues with nausea/diarrhea but no issues with v/c/chewing/swallowing. States # and wt loss has occured within past month. Indicates 1.6% wt loss x 1 month. NFPE completed 7/28 and found mild muscle and fat wasting. Pt meets criteria for mild malnutrition in context of acute illness- see PES statement for details. Provided pt with "CHO Counting for People with Diabetes" handout and discussed A1C value, what A1C % indicates, reason for following diet, foods that are considered CHO, importance of choosing fresh foods, how to use plate method, measurements for CHO foods, rec'd servings/meal, tips, and provided sample menu. LBM 7/25.    Nutrition Discharge Planning: Diabetic,cardiac diet    Nutrition Risk Screen    Nutrition Risk Screen: no indicators present    Nutrition/Diet History    Spiritual, Cultural Beliefs, [employer]ism Practices, Values that Affect Care: no  Food Allergies: NKFA  Factors Affecting Nutritional Intake: diarrhea, decreased appetite    Anthropometrics    Temp: 97.8 °F (36.6 °C)  Height Method: Stated  Height: 5' 5" (165.1 cm)  Height (inches): 65 in  Weight Method: Bed Scale  Weight: 84 kg (185 lb 3 oz)  Weight (lb): 185.19 lb  Ideal Body Weight (IBW), Female: 125 lb  % Ideal Body Weight, Female (lb): 148.15 %  BMI (Calculated): 30.8  BMI Grade: 30 - 34.9- obesity - grade I     Lab/Procedures/Meds    Pertinent Labs Reviewed: reviewed  Pertinent Labs Comments: Glucose 159, A1C 9.5%, Potassium 3.2, , HDL 34  Pertinent Medications Reviewed: reviewed  Pertinent Medications Comments: aspirin, atorvastatin, gabapentin, heparin    Estimated/Assessed Needs    Weight Used For Calorie Calculations: 84 kg (185 lb 3 oz)  Energy Calorie Requirements (kcal): 1623 kcal  Energy Need " Method: Ana Phani (MSJ x 1.1 PAL)  Protein Requirements: 67-76 g (0.8-0.9 g/kg)  Weight Used For Protein Calculations: 84 kg (185 lb 3 oz)  Fluid Requirements (mL): 1 ml or fluid per MD  Estimated Fluid Requirement Method: RDA Method  RDA Method (mL): 1623  CHO Requirement: 203 g    Nutrition Prescription Ordered    Current Diet Order: Diabetic    Evaluation of Received Nutrient/Fluid Intake    I/O: -80 ml since admit  Energy Calories Required: not meeting needs  Protein Required: not meeting needs  Fluid Required: not meeting needs  Tolerance: tolerating  % Intake of Estimated Energy Needs: 0%  % Meal Intake: 0%    Nutrition Risk    Level of Risk/Frequency of Follow-up:  (1 time/week)     Monitor and Evaluation    Food and Nutrient Intake: energy intake, food and beverage intake  Food and Nutrient Adminstration: diet order  Knowledge/Beliefs/Attitudes: food and nutrition knowledge/skill, beliefs and attitudes  Physical Activity and Function: nutrition-related ADLs and IADLs  Anthropometric Measurements: height/length, weight change, weight, body mass index  Biochemical Data, Medical Tests and Procedures: electrolyte and renal panel, glucose/endocrine profile, gastrointestinal profile, inflammatory profile, lipid profile  Nutrition-Focused Physical Findings: overall appearance     Nutrition Follow-Up    RD Follow-up?: Yes     Dee Dee MULLEN-TAN

## 2022-07-28 NOTE — HOSPITAL COURSE
07/28/2022: Repeat CTH stable, ASA and DVT ppx started at 6 and 9 AM respectively. Stable from critical care standpoint, discharged to home per vascular neurology.

## 2022-07-28 NOTE — PLAN OF CARE
07/28/22 1330   Post-Acute Status   Post-Acute Authorization Placement;Home Health   Post-Acute Placement Status Pending medical clearance/testing   Home Health Status   (list provided)      Advised by CM that she provided a HH list to the Pt at bedside during the dc assessment.     Michelle Akins LCSW  Neurocritical Care   [facility]  [id number]

## 2022-07-28 NOTE — NURSING
Called NSCCU team and charge PREETI Fitzpatrick due to pt neuro change. Pt experiencing slurred speech, facial paralysis, numbness on R side of face, and weakness/numbness in R UE and LE. Dr. Perry at bedside; instructed to get pt back to bed and reassess. Pt continuing to improve, but facial paralysis present and neck movement still limited. Team is aware of continued symptons, no new orders at this time.

## 2022-07-28 NOTE — SUBJECTIVE & OBJECTIVE
Interval History:  see above     Review of Systems   Constitutional:  Negative for chills and fever.   HENT:  Negative for sore throat and trouble swallowing.    Eyes:  Negative for photophobia and visual disturbance.   Respiratory:  Negative for cough and shortness of breath.    Cardiovascular:  Negative for chest pain, palpitations and leg swelling.   Gastrointestinal:  Negative for abdominal pain, nausea and vomiting.   Genitourinary:  Negative for difficulty urinating.   Musculoskeletal:  Negative for arthralgias, back pain and joint swelling.   Skin:  Negative for color change, pallor and rash.   Neurological:  Positive for weakness (LSW improving). Negative for dizziness, tremors, seizures, syncope, facial asymmetry (L, resolved), speech difficulty (Improving drastically since onset.), light-headedness, numbness (LUE, LLE. Improving.) and headaches (global, improving).   Psychiatric/Behavioral:  Negative for agitation and confusion.      Objective:     Vitals:  Temp: 99 °F (37.2 °C)  Pulse: 81  Rhythm: normal sinus rhythm  BP: (!) 114/59  MAP (mmHg): 80  Resp: (!) 21  SpO2: (!) 93 %  O2 Device (Oxygen Therapy): nasal cannula    Temp  Min: 97.7 °F (36.5 °C)  Max: 99 °F (37.2 °C)  Pulse  Min: 60  Max: 106  BP  Min: 98/57  Max: 186/92  MAP (mmHg)  Min: 74  Max: 130  Resp  Min: 12  Max: 56  SpO2  Min: 92 %  Max: 98 %    07/27 0701 - 07/28 0700  In: 220 [P.O.:220]  Out: 300 [Urine:200]   Unmeasured Output  Urine Occurrence: 1  Stool Occurrence: 1       Physical Exam  Constitutional:       Appearance: Normal appearance.   HENT:      Head: Normocephalic and atraumatic.   Eyes:      Extraocular Movements: Extraocular movements intact.      Pupils: Pupils are equal, round, and reactive to light.   Cardiovascular:      Rate and Rhythm: Normal rate and regular rhythm.      Pulses: Normal pulses.      Heart sounds: Normal heart sounds.   Pulmonary:      Effort: Pulmonary effort is normal.      Breath sounds: Normal breath  sounds.   Abdominal:      General: Abdomen is flat.      Palpations: Abdomen is soft.   Musculoskeletal:         General: Normal range of motion.      Cervical back: Normal range of motion.   Neurological:      General: No focal deficit present.      Mental Status: She is alert and oriented to person, place, and time.      Sensory: Sensation is intact.      Motor: Weakness present.      Comments: 4/5 strength on the LLE. 5/5 in all other extremities. Sensation intact throughout        Medications:  Continuous Scheduledaspirin, 81 mg, Daily  atorvastatin, 40 mg, Daily  carvediloL, 6.25 mg, BID  gabapentin, 600 mg, BID  heparin (porcine), 5,000 Units, Q8H  nitrofurantoin (macrocrystal-monohydrate), 100 mg, Q12H  polyethylene glycol, 17 g, Daily  sacubitriL-valsartan, 1 tablet, BID    PRNacetaminophen, 1,000 mg, Q6H PRN  dextrose 10%, 12.5 g, PRN  dextrose 10%, 25 g, PRN  glucagon (human recombinant), 1 mg, PRN  glucose, 16 g, PRN  glucose, 24 g, PRN  hydrALAZINE, 10 mg, Q6H PRN  insulin aspart U-100, 1-10 Units, QID (AC + HS) PRN  labetalol, 10 mg, Q15 Min PRN  magnesium oxide, 800 mg, PRN  magnesium oxide, 800 mg, PRN  ondansetron, 4 mg, Q8H PRN  oxyCODONE, 5 mg, Q6H PRN  potassium bicarbonate, 35 mEq, PRN  potassium bicarbonate, 50 mEq, PRN  potassium bicarbonate, 60 mEq, PRN  potassium, sodium phosphates, 2 packet, PRN  potassium, sodium phosphates, 2 packet, PRN  potassium, sodium phosphates, 2 packet, PRN  sodium chloride 0.9%, 10 mL, PRN      Today I personally reviewed pertinent medications, lines/drains/airways, imaging, cardiology results, laboratory results, microbiology results, notably:    Diet  [facility] 1500 Calorie  Diet Cardiac  Diet diabetic [facility]; 1500 Calorie  Diet Cardiac

## 2022-07-28 NOTE — DISCHARGE SUMMARY
[facility] - Neuro Critical Care  Vascular Neurology  [facility]  Discharge Summary     Summary:     Admit Date: 7/27/2022 12:14 AM    Discharge Date and Time:  07/28/2022 3:27 PM    Attending Physician: Muriel Christine MD     Discharge Provider: Omega Guzman PA-C    History of Present Illness: Stroke code activated on Samaria Verma, a 46 y.o. female with history of CHF s/p AICD (non-MRI compatible), DM2, and HTN who presents to ED via  and son with symptoms concerning for large vessel occlusion. History obtained via family at bedside and EMR. Limited history obtained from patient as she presents severely dysarthric.     LKN 10:00 PM 7/26/2022. Patient in usual state of health when  reports she collapsed. Patient was unable to speak but could write/type on husbands phone that she couldn't feel the left side of her body. Patient was driven to [facility] via  for further evaluation. Although speech is limited, patient is able to nod yes/no to questions. Prior to collapse patient reports palpitations and headache.      Hospital Course (synopsis of major diagnoses, care, treatment, and services provided during the course of the hospital stay): 07/28/2022  Repeated CTH last night, negative for acute intracranial process. Therapy recommendations for HH PT/OT. Patient in NCC s/p tpa monitoring. Still reporting LSN and mild LSW (LLE). UA positive for 3+ leukocytes, denies dysuria but will treat with PO abx. WBC 14. Currently on ASA 81mg and atorvastatin 40mg, okay to continue. Patient with fluctuating functional neuro exam. Extremely tearful on exam when rounded in afternoon. C/o R numbness and inability to move neck/shoulders to primary team. Patient able to look side to side and lift arms. Discussed PT/OT recommendations with patient. Discussed patient comfort level prior to dropping discharge orders. Decision to made to go home with HH and follow up outpatient. Will be discharged home  with home health and OP follow up.       Goals of Care Treatment Preferences:  Code Status: Full Code      Stroke Etiology: Stroke Mimic Functional Disorder/Conversion    STROKE DOCUMENTATION   Acute Stroke Times   Last Known Normal Date: 07/26/22  Last Known Normal Time: 2200  Symptom Onset Date: 07/27/22  Symptom Onset Time: 2200  Stroke Team Called Date: 07/27/22  Stroke Team Called Time: 0010  Stroke Team Arrival Date: 07/27/22  Stroke Team Arrival Time: 0012  CT Interpretation Time: 0040  Alteplase Recommended: Yes  CTA Interpretation Time: 0047  Thrombectomy Recommended: No  MRI Acute Stroke Protocol Interpretation Time:  (unable to obtain due to pacemaker)  Decision to Treat Time for Alteplase: 0056     NIH Scale:  1a. Level of Consciousness: 0-->Alert, keenly responsive  1b. LOC Questions: 0-->Answers both questions correctly  1c. LOC Commands: 0-->Performs both tasks correctly  2. Best Gaze: 0-->Normal  3. Visual: 0-->No visual loss  4. Facial Palsy: 0-->Normal symmetrical movements  5a. Motor Arm, Left: 0-->No drift, limb holds 90 (or 45) degrees for full 10 secs  5b. Motor Arm, Right: 0-->No drift, limb holds 90 (or 45) degrees for full 10 secs  6a. Motor Leg, Left: 1-->Drift, leg falls by the end of the 5-sec period but does not hit bed  6b. Motor Leg, Right: 0-->No drift, leg holds 30 degree position for full 5 secs  7. Limb Ataxia: 0-->Absent  8. Sensory: 1-->Mild-to-moderate sensory loss, patient feels pinprick is less sharp or is dull on the affected side, or there is a loss of superficial pain with pinprick, but patient is aware of being touched  9. Best Language: 0-->No aphasia, normal  10. Dysarthria: 0-->Normal  11. Extinction and Inattention (formerly Neglect): 0-->No abnormality  Total (NIH Stroke Scale): 2        Modified [place] Score: 1  [place] Coma Scale:    ABCD2 Score:    [id number] Score:   HAS -BLED Score:   ICH Score:   Hunt & Pinedo Classification:       Assessment/Plan:      Diagnostic Results:    Brain Imaging   CTH 7/27/22 22:21   No acute intracranial process.     Vessel Imaging   CTA MP 7/27/22 00:38   No acute abnormality. No high-grade stenosis or major vessel occlusion.     Bilateral thyroid nodules measuring up to 1.0 cm.  Consider nonemergent thyroid ultrasound for further evaluation when clinically appropriate.     Cardiac Imaging   TTE 7/27/22   · The left ventricle is normal in size with mild eccentric hypertrophy and mildly decreased systolic function.  · The estimated ejection fraction is 45-50%.  · Left ventricular diastolic dysfunction.  · Normal right ventricular size with normal right ventricular systolic function.  · Mild left atrial enlargement.  · The quantitatively derived ejection fraction is 44%.  · There is abnormal septal wall motion consistent with right ventricular pacemaker.      Interventions: IV t-PA    Complications: None    Disposition: Home or Self Care    Final Active Diagnoses:    Diagnosis Date Noted POA    PRINCIPAL PROBLEM:  Functional neurological symptom disorder with mixed symptoms [F44.7] 07/27/2022 Yes    Multiple thyroid nodules [E04.2] 07/28/2022 Unknown    RUQ abdominal tenderness [R10.811] 07/27/2022 Unknown    Mixed hyperlipidemia [E78.2] 03/15/2022 Yes    Chronic systolic congestive heart failure [I50.22] 03/07/2017 Yes     Chronic    Diabetes mellitus type 2 in obese [E11.69, E66.9] 01/06/2016 Yes     Chronic    Primary hypertension [I10] 01/06/2016 Yes     Chronic      Problems Resolved During this Admission:     No new Assessment & Plan notes have been filed under this hospital service since the last note was generated.  Service: Vascular Neurology      Recommendations:     Post-discharge complication risks: None    Stroke Education given to: patient    Follow-up in Stroke Clinic in 4-6 weeks.     Discharge Plan:  Antithrombotics: Aspirin 81mg  Statin: Atorvastatin 40mg    Follow Up:   Follow-up Information     Dylan SHARMA  DO Juan. Go on 8/8/2022.    Specialty: Internal Medicine  Why: Hospital follow up at 2:40 pm.  Please wear a mask  Contact information:  [address] [place] [address]  [phone]             [facility] VASCULAR NEUROLOGY Follow up in 1 week(s).    Specialty: Vascular Neurology  Contact information:  Romel Dunn  [facility] [address]  [phone]                       Patient Instructions:      Ambulatory referral/consult to Vascular Neurology   Standing Status: Future   Referral Priority: Routine Referral Type: Consultation   Referral Reason: Specialty Services Required   Requested Specialty: Vascular Neurology     Diet Cardiac   Order Comments: See Stroke Patient Education Guide Booklet for details.     Call 911 for any of the following:   Order Comments: Call 911  right away if any of the following warning signs come on suddenly, even if the symptoms only last for a few minutes. With stroke, timing is very important.   - Warning Signs of Stroke:  - Weakness: You may feel a sudden weakness, tingling or loss of feeling on one side of your face or body.  - Vision Problems: You may have sudden double vision or trouble seeing in one or both eyes.  - Speech Problems: You may have sudden trouble talking, slured speech, or problems understanding others.  - Headache: You may have sudden, severe headache.  - Movement Problems: You may experience dizziness, a feeling of spinning, a loss of balance, a feeling of falling or blackouts.     Activity as tolerated       Medications:  Reconciled Home Medications:      Medication List      START taking these medications    nitrofurantoin (macrocrystal-monohydrate) 100 MG capsule  Commonly known as: MACROBID  Take 1 capsule (100 mg total) by mouth every 12 (twelve) hours. for 3 days        CHANGE how you take these medications    furosemide 40 MG tablet  Commonly known as: LASIX  Take 1 tablet (40 mg total) by mouth 2 (two) times daily.  What changed:  "  · when to take this  · additional instructions        CONTINUE taking these medications    ACCU-CHEK GUIDE TEST STRIPS Strp  Generic drug: blood sugar diagnostic  Use to test blood glucose 4 (four) times daily before meals and nightly.     ACCU-CHEK SOFTCLIX LANCETS Misc  Generic drug: lancets  Use to check blood glucose 4 (four) times daily before meals and nightly.     aspirin 81 MG EC tablet  Commonly known as: ECOTRIN  Take 1 tablet (81 mg total) by mouth once daily.     atorvastatin 40 MG tablet  Commonly known as: LIPITOR  Take 1 tablet (40 mg total) by mouth once daily.     BD ULTRA-FINE SHORT PEN NEEDLE 31 gauge x 5/16" Ndle  Generic drug: pen needle, diabetic  Use to inject insulin 4 times daily     carvediloL 6.25 MG tablet  Commonly known as: COREG  Take 1 tablet (6.25 mg total) by mouth 2 (two) times daily.     DEXCOM G6 SENSOR Bhumika  Generic drug: blood-glucose sensor  Apply 1 sensor every 10 days     DEXCOM G6 TRANSMITTER Bhumika  Generic drug: blood-glucose transmitter  Replace every 90 days     ENTRESTO 24-26 mg per tablet  Generic drug: sacubitriL-valsartan  Take 1 tablet by mouth 2 (two) times daily.     gabapentin 300 MG capsule  Commonly known as: NEURONTIN  Take 2 capsules (600 mg total) by mouth 2 (two) times daily.     nitroGLYCERIN 0.4 MG SL tablet  Commonly known as: NITROSTAT  Place 1 tablet (0.4 mg total) under the tongue every 5 (five) minutes as needed for Chest pain.     * NovoLOG Flexpen U-100 Insulin 100 unit/mL (3 mL) Inpn pen  Generic drug: insulin aspart U-100  Inject 15 Units into the skin 4 (four) times daily with meals and nightly. Plus sliding scale, max of 90 units per day     * insulin aspart U-100 100 unit/mL (3 mL) Inpn pen  Commonly known as: NovoLOG Flexpen U-100 Insulin  15 units with meals plus ss, or max daily dose of 100 units.     TRESIBA FLEXTOUCH U-200 200 unit/mL (3 mL) insulin pen  Generic drug: insulin degludec  Inject 100 Units into the skin every evening.   "   TRUEPLUS INSULIN 1 mL 31 gauge x 5/16 Syrg  Generic drug: insulin syringe-needle U-100  Use to inject insulin 5 times daily         * This list has 2 medication(s) that are the same as other medications prescribed for you. Read the directions carefully, and ask your doctor or other care provider to review them with you.            STOP taking these medications    ACCU-CHEK GUIDE GLUCOSE METER Misc  Generic drug: blood-glucose meter     albuterol 90 mcg/actuation inhaler  Commonly known as: PROVENTIL/VENTOLIN HFA     azelastine 137 mcg (0.1 %) nasal spray  Commonly known as: ASTELIN     betamethasone dipropionate 0.05 % cream     clonazePAM 0.5 MG tablet  Commonly known as: KlonoPIN     DEXCOM G6  Misc  Generic drug: blood-glucose meter,continuous     famotidine 20 MG tablet  Commonly known as: PEPCID     HYDROcodone-acetaminophen 7.5-325 mg per tablet  Commonly known as: NORCO     methocarbamoL 500 MG Tab  Commonly known as: ROBAXIN     ondansetron 4 MG tablet  Commonly known as: ZAKIA Guzman PA-C  [facility]  Department of Vascular Neurology   [facility] - Neuro Critical Care

## 2022-07-28 NOTE — ASSESSMENT & PLAN NOTE
Previous Lipid panel 1 year prior with elevated cholesterol, triglycerides, and LDL  Stroke risk factor    Continue home atorvastatin 40 mg LDL goal < 70  LDL this admission 70.6

## 2022-07-28 NOTE — DISCHARGE INSTRUCTIONS
Follow up with neurology clinic; start taking new medications reviewed in D/c instructions. Please come to ER if any symptoms occur.

## 2022-07-28 NOTE — PT/OT/SLP PROGRESS
Occupational Therapy   Treatment    Patient Name:  Samaria Verma   MRN:  [id number]  Admit Date: 7/27/2022  Admitting Diagnosis:  Stroke due to occlusion of right middle cerebral artery   Length of Stay: 1 days  Recent Surgery: * No surgery found *           Recommendations:     Discharge Recommendations: home health PT, home health OT  Discharge Equipment Recommendations:  walker, rolling  Barriers to discharge:  None    Plan:     Patient to be seen 3 x/week to address the above listed problems via self-care/home management, therapeutic activities, therapeutic exercises, neuromuscular re-education  · Plan of Care Expires: 08/26/22  · Plan of Care Reviewed with: patient    Assessment:   Samaria Verma is a 46 y.o. female with a medical diagnosis of Stroke due to occlusion of right middle cerebral artery.  She presents with the following performance deficits affecting function: weakness, impaired endurance, impaired self care skills, impaired functional mobility, gait instability, impaired balance, decreased safety awareness.  Pt would benefit from skilled OT services in order to maximize independence with ADLs and facilitate safe discharge. Pt would benefit from home health OT once medically stable for discharge. .    Time spent performing bed mobility, self-care and ambulation in room    Pt continues to require significant assist with functional mobility and safe completion of ADLs requiring Min-SBA. Pt with noted improvement with ambulation and bed mobility as compared to last session. Patient is making progress towards goals.    Rehab Prognosis: Good; patient would benefit from acute skilled OT services to address these deficits and reach maximum level of function.        Subjective   Communicated with: RN prior to session.  Patient found supine with telemetry, pulse ox (continuous), blood pressure cuff, PureWick upon OT entry to room.    Patient: I am doing ok    Pain/Comfort:  · Pain Rating 1:  (slight stomach  "discomfort)    Objective:   Patient found with: telemetry, pulse ox (continuous), blood pressure cuff, PureWick   General Precautions: Standard, fall   Orthopedic Precautions:N/A   Braces: N/A   Oxygen Device: Room Air  Vitals: BP (!) 107/56 (BP Location: Right arm, Patient Position: Lying)   Pulse 69   Temp 97.8 °F (36.6 °C) (Oral)   Resp 18   Ht 5' 5" (1.651 m)   Wt 84 kg (185 lb 3 oz)   LMP  (LMP Unknown)   SpO2 96%   Breastfeeding No   BMI 30.82 kg/m²     Outcome Measures:  AMPA 6 Click ADL: 22    Occupational Performance:  Bed Mobility:       Rolling right: stand by assistance   Scooting: towards EOB with stand by assistance   Supine to Sit: stand by assistance    Functional Mobility/Transfers:     Sit to Stand: contact guard assistance using hand-held assist    Bed <> Chair: Step Transfer with dependence using hand-held assist   Pt ambulated 2x10' CGA with HAH to simulate household and/or community distances in order to maximize functional activity tolerance and dynamic standing balance required for engagement in occupations of choice.   o LOB: No  o SOB: No  o Dizziness: Yes - initially when standing however resolved when patient focused on one target (SBP > 110)    Activities of Daily Living:     Feeding: supervision in bedside chair to eat fruit   Grooming: contact guard assistance to perform oral and hair hygiene standing at sink   Lower Body Dressing: stand by assistance to don/doff socks sitting EOB   Toileting: stand by assistance to perform perineal hygiene and clothing management from sitting EOB; pt able to simulate anterior pericare w/ removal of purewick      AMPAC 6 Click ADL:  AMPA Total Score: 22    Treatment & Education:   Therapist provided facilitation and instruction of proper body mechanics, energy conservation, and fall prevention strategies during tasks listed above.   Pt re-educated on importance of OOB activities with staff member assistance and sitting OOB " majority of the day.    Updated communication board with level of assist required (CGA) & educated RN/patient that pt is appropriate for transfers and mobility with RN/PCT.       Patient left up in chair with all lines intact, call button in reach, chair alarm on and RN notified    GOALS:   Multidisciplinary Problems     Occupational Therapy Goals        Problem: Occupational Therapy    Goal Priority Disciplines Outcome Interventions   Occupational Therapy Goal     OT, PT/OT Ongoing, Progressing    Description: Goals to be met by: 8/24/2022     Patient will increase functional independence with ADLs by performing:    Feeding with [place].  UE Dressing with [place].  LE Dressing with [place].  Grooming while standing at sink with [place].  Toileting from toilet with [place] for hygiene and clothing management.   Bathing from  shower chair/bench with [place].  Toilet transfer to toilet with [place].                     Time Tracking:     OT Date of Treatment: 07/28/22  OT Start Time: 0841  OT Stop Time: 0858  OT Total Time (min): 17 min    Additional staff present: N/A    Billable Minutes:  Self Care/Home Management Camila Rose (Ali), OTR/L  [phone] (Pager #)  7/28/2022

## 2022-07-28 NOTE — ASSESSMENT & PLAN NOTE
-Right MCA CVA s/p tPA  -Intial NIH 16 per chart review. NIH on admission 6 at end of tpa infusion.   -Initial CTH w/o mass, hemorrhage, or obvious edema. Unable to have MRI due to AICD incompatibility   -Admit to NCC  -Vascular Neurology consulted, appreciate recs  -Hourly neuro checks and vital signs  -EKG, Echo, and CXR pending  -A1c of 9.5% 1 month ago   -TSH and lipid panel pending  -aPTT, PT/INR WNL   -CBC, CMP, Mag, and phos daily  -SBP goal < 180  -PRN labetalol and hydralazine  -Statin   -SCD; hold DVT ppx in tpa window-> started @ 0900 on 7/28  -CTA on admission w/o LVO   -Repeat CTH ordered for 2000 7/27   -PT/OT/SLP

## 2022-07-28 NOTE — HOSPITAL COURSE
07/28/2022  Repeated CTH last night, negative for acute intracranial process. Therapy recommendations for HH PT/OT. Patient in NCC s/p tpa monitoring. Still reporting LSN and mild LSW (LLE). UA positive for 3+ leukocytes, denies dysuria but will treat with PO abx. WBC 14. Currently on ASA 81mg and atorvastatin 40mg, okay to continue. Patient with fluctuating functional neuro exam. Extremely tearful on exam when rounded in afternoon. C/o R numbness and inability to move neck/shoulders to primary team. Patient able to look side to side and lift arms. Discussed PT/OT recommendations with patient. Discussed patient comfort level prior to dropping discharge orders. Decision to made to go home with HH and follow up outpatient. Will be discharged home with home health and OP follow up.

## 2022-07-28 NOTE — PT/OT/SLP PROGRESS
"Physical Therapy Treatment    Patient Name:  Samaria Verma   MRN:  [id number]    Recommendations:     Discharge Recommendations:  home health PT, home health OT   Discharge Equipment Recommendations: none   Barriers to discharge: None    Assessment:     Samaria Verma is a 46 y.o. female admitted with a medical diagnosis of Stroke due to occlusion of right middle cerebral artery.  She presents with the following impairments/functional limitations:  weakness, impaired endurance, impaired self care skills, impaired functional mobility, impaired balance, gait instability, impaired cardiopulmonary response to activity. Pt participated, and tolerated treatment well. Pt will continue to benefit from skilled PT services to improve all deficits noted above. Resume PT POC as indicated.     Rehab Prognosis: Good; patient would benefit from acute skilled PT services to address these deficits and reach maximum level of function.    Recent Surgery: * No surgery found *      Plan:     During this hospitalization, patient to be seen 4 x/week to address the identified rehab impairments via gait training, therapeutic activities, neuromuscular re-education, therapeutic exercises and progress toward the following goals:    · Plan of Care Expires:  08/26/22    Subjective     Chief Complaint: "I have a slight headache."  Patient/Family Comments/goals: none stated  Pain/Comfort:  · Pain Rating 1: 0/10  · Pain Rating Post-Intervention 1: 0/10      Objective:     Communicated with nursing prior to session.  Patient found up in chair with  (all lines intact and nursing present) upon PT entry to room.     General Precautions: Standard, fall   Orthopedic Precautions:N/A   Braces: N/A  Respiratory Status: Room air     Functional Mobility:  · Transfers:  Sit to Stand:  stand by assistance and contact guard assistance with hand-held assist  · Gait: ~16ft CGA with HHA. No LOB noted.      AM-PAC 6 CLICK MOBILITY  Turning over in bed (including adjusting " bedclothes, sheets and blankets)?: 4  Sitting down on and standing up from a chair with arms (e.g., wheelchair, bedside commode, etc.): 3  Moving from lying on back to sitting on the side of the bed?: 4  Moving to and from a bed to a chair (including a wheelchair)?: 3  Need to walk in hospital room?: 3  Climbing 3-5 steps with a railing?: 2  Basic Mobility Total Score: 19       Therapeutic Activities and Exercises:   -All questions/concerns were answered within PTA scope of practice.   -BLE therex x10 reps: AP,LAQ,HF    Patient left up in chair with all lines intact, call button in reach and nsg notified..    GOALS:   Multidisciplinary Problems     Physical Therapy Goals        Problem: Physical Therapy    Goal Priority Disciplines Outcome Goal Variances Interventions   Physical Therapy Goal     PT, PT/OT Ongoing, Progressing     Description: Goals to be met by: 22     Patient will increase functional independence with mobility by performin. Supine to sit with [place] - Not met  2. Sit to stand transfer with Supervision - Not met  3. Gait  x 200 feet with Stand-by Assistance using No Assistive Device - Not met                   Time Tracking:     PT Received On: 22  PT Start Time: 1041     PT Stop Time: 1053  PT Total Time (min): 12 min     Billable Minutes: Gait Training 12    Treatment Type: Treatment  PT/PTA: PTA     PTA Visit Number: 2022

## 2022-07-28 NOTE — ASSESSMENT & PLAN NOTE
-Echo as below  -EKG pending  -AICD in place, not MRI compatible  -continue home coreg  -restart home entresto as appropriate  -repeat Echo obtained    Echo - March 2022    Interpretation Summary  · The left ventricle is normal in size with severely decreased systolic function. The estimated ejection fraction is 25%.  · There is severe left ventricular global hypokinesis.  · Normal right ventricular size with normal right ventricular systolic function.  · Left ventricular diastolic dysfunction.  · Mild left atrial enlargement.  · Normal central venous pressure (3 mmHg).

## 2022-07-28 NOTE — SUBJECTIVE & OBJECTIVE
Neurologic Chief Complaint: R MCA syndrome     Subjective:     Interval History: Patient is seen for follow-up neurological assessment and treatment recommendations:     Repeated CTH last night, negative for acute intracranial process. Therapy recommendations for HH PT/OT. Patient in NCC s/p tpa monitoring. Still reporting LSN and mild LSW (LLE). UA positive for 3+ leukocytes, denies dysuria but will treat with PO abx. WBC 14. Currently on ASA 81mg and atorvastatin 40mg, okay to continue. Patient with fluctuating functional neuro exam. Extremely tearful on exam when rounded in afternoon. C/o R numbness and inability to move neck/shoulders to primary team. Patient able to look side to side and lift arms. Discussed PT/OT recommendations with patient. Discussed patient comfort level prior to dropping discharge orders. Decision to made to go home with HH and follow up outpatient. Will be discharged home with home health and OP follow up.      HPI, Past Medical, Family, and Social History remains the same as documented in the initial encounter.     Review of Systems   Constitutional:  Negative for diaphoresis and fever.   HENT:  Negative for drooling and rhinorrhea.    Eyes:  Negative for visual disturbance.   Respiratory:  Negative for cough, choking and shortness of breath.    Gastrointestinal:  Negative for diarrhea and vomiting.   Genitourinary:  Negative for dysuria.   Neurological:  Positive for weakness and numbness. Negative for facial asymmetry, speech difficulty and headaches.   Psychiatric/Behavioral:  Negative for agitation, behavioral problems and confusion. The patient is not nervous/anxious.    Scheduled Meds:   aspirin  81 mg Oral Daily    atorvastatin  40 mg Oral Daily    carvediloL  6.25 mg Oral BID    gabapentin  600 mg Oral BID    heparin (porcine)  5,000 Units Subcutaneous Q8H    nitrofurantoin (macrocrystal-monohydrate)  100 mg Oral Q12H    polyethylene glycol  17 g Oral Daily     sacubitriL-valsartan  1 tablet Oral BID     Continuous Infusions:  PRN Meds:acetaminophen, dextrose 10%, dextrose 10%, glucagon (human recombinant), glucose, glucose, hydrALAZINE, insulin aspart U-100, labetalol, magnesium oxide, magnesium oxide, ondansetron, oxyCODONE, potassium bicarbonate, potassium bicarbonate, potassium bicarbonate, potassium, sodium phosphates, potassium, sodium phosphates, potassium, sodium phosphates, sodium chloride 0.9%    Objective:     Vital Signs (Most Recent):  Temp: 97.8 °F (36.6 °C) (07/28/22 1101)  Pulse: 73 (07/28/22 1301)  Resp: 19 (07/28/22 1301)  BP: (!) 107/54 (07/28/22 1301)  SpO2: 96 % (07/28/22 1301)  BP Location: Right arm    Vital Signs Range (Last 24H):  Temp:  [97.7 °F (36.5 °C)-98.8 °F (37.1 °C)]   Pulse:  [60-76]   Resp:  [12-30]   BP: ()/(54-81)   SpO2:  [92 %-97 %]   BP Location: Right arm    Physical Exam  Vitals and nursing note reviewed.   Constitutional:       Appearance: She is not ill-appearing or diaphoretic.   HENT:      Head: Normocephalic and atraumatic.      Nose: No rhinorrhea.   Eyes:      General: No scleral icterus.     Extraocular Movements: Extraocular movements intact.   Cardiovascular:      Rate and Rhythm: Normal rate.   Pulmonary:      Effort: Pulmonary effort is normal. No respiratory distress.   Musculoskeletal:         General: No tenderness.   Skin:     General: Skin is warm and dry.   Neurological:      Mental Status: She is alert and oriented to person, place, and time.      Sensory: Sensory deficit present.      Motor: Weakness present.       Neurological Exam:   LOC: alert  Attention Span: Good   Language: No aphasia  Articulation: No dysarthria  Orientation: Person, Place, Time   Visual Fields: Full  EOM (CN III, IV, VI): Full/intact  Facial Movement (CN VII): Symmetric facial expression    Motor: Arm left  Normal 5/5  Leg left  Paresis: 4/5  Arm right  Normal 5/5  Leg right Normal 5/5  Sensation: Carmine-hypoesthesia  left    Laboratory:  CMP:   Recent Labs   Lab 07/28/22  0340   CALCIUM 9.6   ALBUMIN 3.8   PROT 7.1      K 3.2*   CO2 26      BUN 16   CREATININE 0.6   ALKPHOS 91   ALT 12   AST 12   BILITOT 0.3     BMP:   Recent Labs   Lab 07/28/22  0340      K 3.2*      CO2 26   BUN 16   CREATININE 0.6   CALCIUM 9.6     CBC:   Recent Labs   Lab 07/28/22  0340   WBC 14.46*   RBC 4.53   HGB 12.0   HCT 37.7      MCV 83   MCH 26.5*   MCHC 31.8*     Lipid Panel:   Recent Labs   Lab 07/27/22  0336   CHOL 183   LDLCALC 70.6   HDL 35*   TRIG 387*     Coagulation:   Recent Labs   Lab 07/27/22  0030   INR 1.1     Platelet Aggregation Study: No results for input(s): PLTAGG, PLTAGINTERP, PLTAGREGLACO, ADPPLTAGGREG in the last 168 hours.  Hgb A1C: No results for input(s): HGBA1C in the last 168 hours.  TSH:   Recent Labs   Lab 07/27/22  0336   TSH 1.692       Diagnostic Results     Brain Imaging   CTH 7/27/22 22:21   No acute intracranial process.    Vessel Imaging   CTA MP 7/27/22 00:38   No acute abnormality. No high-grade stenosis or major vessel occlusion.     Bilateral thyroid nodules measuring up to 1.0 cm.  Consider nonemergent thyroid ultrasound for further evaluation when clinically appropriate.    Cardiac Imaging   TTE 7/27/22   The left ventricle is normal in size with mild eccentric hypertrophy and mildly decreased systolic function.  The estimated ejection fraction is 45-50%.  Left ventricular diastolic dysfunction.  Normal right ventricular size with normal right ventricular systolic function.  Mild left atrial enlargement.  The quantitatively derived ejection fraction is 44%.  There is abnormal septal wall motion consistent with right ventricular pacemaker.

## 2022-07-28 NOTE — PLAN OF CARE
Recommendations    1. Continue current diabetic diet- encourage adequate PO intake.    - If PO intake %, add cardiac diet restrictions.     2. Add Boost Glucose TID- pt prefers strawberry only.    - If PO intake 50%, decrease Boost Glucose to BID.     3. Consider adding appetite stimulant.     4. RD following.    Goals: Will meet % EEN/EPN by next RD f/u.  Nutrition Goal Status: new  Communication of RD Recs:  (POC)    Dee Dee Card PL-LDN

## 2022-07-29 PROCEDURE — G0180 PR HOME HEALTH MD CERTIFICATION: ICD-10-PCS | Mod: ,,, | Performed by: INTERNAL MEDICINE

## 2022-07-29 PROCEDURE — G0180 MD CERTIFICATION HHA PATIENT: HCPCS | Mod: ,,, | Performed by: INTERNAL MEDICINE

## 2022-07-31 PROBLEM — R53.1 LEFT-SIDED WEAKNESS: Status: ACTIVE | Noted: 2022-07-31

## 2022-08-01 PROBLEM — F41.9 ANXIETY: Status: ACTIVE | Noted: 2022-08-01

## 2022-08-01 PROBLEM — I63.211 CEREBROVASCULAR ACCIDENT (CVA) DUE TO OCCLUSION OF RIGHT VERTEBRAL ARTERY: Status: ACTIVE | Noted: 2022-08-01

## 2022-08-01 PROBLEM — I65.01 OCCLUSION AND STENOSIS OF RIGHT VERTEBRAL ARTERY: Status: ACTIVE | Noted: 2022-08-01

## 2022-08-04 ENCOUNTER — TELEPHONE (OUTPATIENT)
Dept: FAMILY MEDICINE | Facility: CLINIC | Age: 47
End: 2022-08-04
Payer: MEDICARE

## 2022-08-04 NOTE — TELEPHONE ENCOUNTER
----- Message from Dyllan Rogel sent at 8/4/2022  2:36 PM CDT -----  Contact: Nancy at [phone]  Type: Needs Medical Advice  Who Called:  Nancy at [facility] Call Back Number: [phone]  Additional Information: Nancy is calling the office to request orders for home health care. She also states pt has been depressed and wanted to get an antidepressant. Please call back and advise.

## 2022-08-12 ENCOUNTER — PATIENT MESSAGE (OUTPATIENT)
Dept: ADMINISTRATIVE | Facility: HOSPITAL | Age: 47
End: 2022-08-12
Payer: MEDICARE

## 2022-08-12 ENCOUNTER — PATIENT OUTREACH (OUTPATIENT)
Dept: ADMINISTRATIVE | Facility: HOSPITAL | Age: 47
End: 2022-08-12
Payer: MEDICARE

## 2022-08-16 ENCOUNTER — TELEPHONE (OUTPATIENT)
Dept: ADMINISTRATIVE | Facility: HOSPITAL | Age: 47
End: 2022-08-16
Payer: MEDICARE

## 2022-08-17 ENCOUNTER — EXTERNAL HOME HEALTH (OUTPATIENT)
Dept: HOME HEALTH SERVICES | Facility: HOSPITAL | Age: 47
End: 2022-08-17
Payer: MEDICARE

## 2022-08-20 ENCOUNTER — CLINICAL SUPPORT (OUTPATIENT)
Dept: CARDIOLOGY | Facility: HOSPITAL | Age: 47
End: 2022-08-20
Payer: MEDICARE

## 2022-08-20 DIAGNOSIS — Z95.810 PRESENCE OF AUTOMATIC (IMPLANTABLE) CARDIAC DEFIBRILLATOR: ICD-10-CM

## 2022-08-20 PROCEDURE — 93295 DEV INTERROG REMOTE 1/2/MLT: CPT | Mod: ,,, | Performed by: INTERNAL MEDICINE

## 2022-08-20 PROCEDURE — 93295 CARDIAC DEVICE CHECK - REMOTE: ICD-10-PCS | Mod: ,,, | Performed by: INTERNAL MEDICINE

## 2022-09-07 ENCOUNTER — DOCUMENT SCAN (OUTPATIENT)
Dept: HOME HEALTH SERVICES | Facility: HOSPITAL | Age: 47
End: 2022-09-07
Payer: MEDICARE

## 2022-10-10 ENCOUNTER — LAB VISIT (OUTPATIENT)
Dept: LAB | Facility: HOSPITAL | Age: 47
End: 2022-10-10
Attending: INTERNAL MEDICINE
Payer: MEDICARE

## 2022-10-10 ENCOUNTER — OFFICE VISIT (OUTPATIENT)
Dept: FAMILY MEDICINE | Facility: CLINIC | Age: 47
End: 2022-10-10
Payer: MEDICARE

## 2022-10-10 ENCOUNTER — PATIENT OUTREACH (OUTPATIENT)
Dept: ADMINISTRATIVE | Facility: HOSPITAL | Age: 47
End: 2022-10-10
Payer: MEDICARE

## 2022-10-10 VITALS
HEART RATE: 81 BPM | DIASTOLIC BLOOD PRESSURE: 64 MMHG | BODY MASS INDEX: 30.81 KG/M2 | SYSTOLIC BLOOD PRESSURE: 112 MMHG | OXYGEN SATURATION: 97 % | HEIGHT: 65 IN | WEIGHT: 184.94 LBS

## 2022-10-10 DIAGNOSIS — E66.9 DIABETES MELLITUS TYPE 2 IN OBESE: ICD-10-CM

## 2022-10-10 DIAGNOSIS — F32.A DEPRESSION, UNSPECIFIED DEPRESSION TYPE: ICD-10-CM

## 2022-10-10 DIAGNOSIS — G89.29 CHRONIC ABDOMINAL PAIN: ICD-10-CM

## 2022-10-10 DIAGNOSIS — G47.00 INSOMNIA, UNSPECIFIED TYPE: ICD-10-CM

## 2022-10-10 DIAGNOSIS — E87.6 HYPOKALEMIA: ICD-10-CM

## 2022-10-10 DIAGNOSIS — F41.9 ANXIETY: ICD-10-CM

## 2022-10-10 DIAGNOSIS — E11.69 DIABETES MELLITUS TYPE 2 IN OBESE: ICD-10-CM

## 2022-10-10 DIAGNOSIS — I10 PRIMARY HYPERTENSION: Chronic | ICD-10-CM

## 2022-10-10 DIAGNOSIS — Z09 HOSPITAL DISCHARGE FOLLOW-UP: Primary | ICD-10-CM

## 2022-10-10 DIAGNOSIS — R10.9 CHRONIC ABDOMINAL PAIN: ICD-10-CM

## 2022-10-10 DIAGNOSIS — I50.22 CHRONIC SYSTOLIC CONGESTIVE HEART FAILURE: Chronic | ICD-10-CM

## 2022-10-10 PROBLEM — N39.0 UTI (URINARY TRACT INFECTION): Status: RESOLVED | Noted: 2022-07-28 | Resolved: 2022-10-10

## 2022-10-10 PROCEDURE — 99215 OFFICE O/P EST HI 40 MIN: CPT | Mod: PBBFAC,PO | Performed by: INTERNAL MEDICINE

## 2022-10-10 PROCEDURE — 84132 ASSAY OF SERUM POTASSIUM: CPT | Performed by: INTERNAL MEDICINE

## 2022-10-10 PROCEDURE — 99999 PR PBB SHADOW E&M-EST. PATIENT-LVL V: ICD-10-PCS | Mod: PBBFAC,,, | Performed by: INTERNAL MEDICINE

## 2022-10-10 PROCEDURE — 83036 HEMOGLOBIN GLYCOSYLATED A1C: CPT | Performed by: INTERNAL MEDICINE

## 2022-10-10 PROCEDURE — 99999 PR PBB SHADOW E&M-EST. PATIENT-LVL V: CPT | Mod: PBBFAC,,, | Performed by: INTERNAL MEDICINE

## 2022-10-10 PROCEDURE — 36415 COLL VENOUS BLD VENIPUNCTURE: CPT | Mod: PO | Performed by: INTERNAL MEDICINE

## 2022-10-10 PROCEDURE — 99214 PR OFFICE/OUTPT VISIT, EST, LEVL IV, 30-39 MIN: ICD-10-PCS | Mod: S$PBB,,, | Performed by: INTERNAL MEDICINE

## 2022-10-10 PROCEDURE — 99214 OFFICE O/P EST MOD 30 MIN: CPT | Mod: S$PBB,,, | Performed by: INTERNAL MEDICINE

## 2022-10-10 RX ORDER — MIRTAZAPINE 15 MG/1
15 TABLET, FILM COATED ORAL NIGHTLY
Qty: 90 TABLET | Refills: 1 | Status: SHIPPED | OUTPATIENT
Start: 2022-10-10 | End: 2023-03-24 | Stop reason: SDUPTHER

## 2022-10-10 NOTE — PROGRESS NOTES
Patient ID: Samaria Verma is a 47 y.o. female.    Chief Complaint: Hospital Follow Up (Abdominal pain that has continued since hospital release and removal of gallbladder )    Hospital Course:   She was admitted for further eval and treatment of left side weakness and dysarthria. She was deemed not a tPA candidate due to recent tPA.  She is unable to undergo MRI. 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 Neurology consulted. She is on ASA + statin, and he recommends addition of plavix for 90 days to treat vascular disease and high risk TIA. She does have risk factors which need to be modified; however no evidence of acute stroke. Suspect her symptoms are due to functional/conversion disorder. She met with psychiatry which was felt to be very helpful (please see Dr. Vinson' note). She was started on mood stabilizer and anti-depressant meds. She feels improved and is agreeable to outpatient therapy. She was discharged home in stable condition    Chronic Medical Problems   Type 2 diabetes, chronic systolic heart failure, hypertension, hyperlipidemia     Assessment HPI / Notes Impression / Plan   Type 2 diabetes Needs     Conversion disorder Was taking depakote from psych consult. Mirtazapine rxed as well.  Refill mirtazapine.    Hypertension Controlled  Continue med   RUQ abd pain  Still having intense stabbing RUQ abd pain. Has not changed since gall bladder removal. She has not seen GI for it.  Unknown etiology, GI referral           Dx and Orders   Samaria was seen today for hospital follow up.    Diagnoses and all orders for this visit:    Primary hypertension  -     Cancel: Comprehensive Metabolic Panel; Future  -     Cancel: CBC Auto Differential; Future    Anxiety  -     Ambulatory referral/consult to Psychiatry; Future    Depression, unspecified depression type  -     Ambulatory referral/consult to Psychiatry; Future    Chronic abdominal  pain  -     Ambulatory referral/consult to Gastroenterology; Future    Insomnia, unspecified type  -     mirtazapine (REMERON) 15 MG tablet; Take 1 tablet (15 mg total) by mouth every evening.    Diabetes mellitus type 2 in obese  -     Hemoglobin A1C; Future    Hypokalemia  -     Potassium; Future    Chronic systolic congestive heart failure  -     Ambulatory referral/consult to Cardiology; Future        Review of Systems   Constitutional:  Negative for fever.   Gastrointestinal:  Positive for abdominal pain, diarrhea, nausea and vomiting. Negative for constipation.   Genitourinary:  Negative for dysuria, frequency and hematuria.   Musculoskeletal:  Positive for myalgias. Negative for arthralgias.   Neurological:  Positive for headaches.   Vitals:    10/10/22 1324   BP: 112/64   Pulse: 81     Wt Readings from Last 3 Encounters:   10/10/22 1324 83.9 kg (184 lb 15.5 oz)   07/31/22 2043 83 kg (182 lb 15.7 oz)   07/28/22 1432 84 kg (185 lb 3 oz)   07/27/22 1655 84 kg (185 lb 3 oz)   07/27/22 1017 86.2 kg (190 lb)   07/27/22 0013 86.2 kg (190 lb)      Body mass index is 30.78 kg/m².   Physical Exam  Cardiovascular:      Rate and Rhythm: Normal rate and regular rhythm.      Heart sounds: No murmur heard.    No gallop.   Pulmonary:      Breath sounds: Normal breath sounds. No wheezing or rhonchi.   Abdominal:      Palpations: Abdomen is soft.      Tenderness: There is no abdominal tenderness.   Musculoskeletal:         General: Normal range of motion.      Cervical back: Neck supple.   Skin:     General: Skin is warm.      Findings: No rash.   Neurological:      Mental Status: She is alert.   Psychiatric:         Behavior: Behavior normal.     Diabetes Medications               insulin aspart U-100 (NOVOLOG FLEXPEN U-100 INSULIN) 100 unit/mL (3 mL) InPn pen 15 units with meals plus ss, or max daily dose of 100 units.    insulin degludec (TRESIBA FLEXTOUCH U-200) 200 unit/mL (3 mL) insulin pen Inject 80 Units into the skin  every evening.           Hypertension Medications               carvediloL (COREG) 6.25 MG tablet Take 1 tablet (6.25 mg total) by mouth 2 (two) times daily.    furosemide (LASIX) 40 MG tablet Take 1 tablet (40 mg total) by mouth once daily. Takes once daily in morning    nitroGLYCERIN (NITROSTAT) 0.4 MG SL tablet Place 1 tablet (0.4 mg total) under the tongue every 5 (five) minutes as needed for Chest pain.    sacubitriL-valsartan (ENTRESTO) 24-26 mg per tablet Take 1 tablet by mouth 2 (two) times daily.           Hyperlipidemia Medications               atorvastatin (LIPITOR) 40 MG tablet Take 1 tablet (40 mg total) by mouth once daily.           Medication List with Changes/Refills   Current Medications    ASPIRIN (ECOTRIN) 81 MG EC TABLET    Take 1 tablet (81 mg total) by mouth once daily.    ATORVASTATIN (LIPITOR) 40 MG TABLET    Take 1 tablet (40 mg total) by mouth once daily.    BLOOD SUGAR DIAGNOSTIC STRP    Use to test blood glucose 4 (four) times daily before meals and nightly.    BLOOD-GLUCOSE SENSOR (DEXCOM G5-G4 SENSOR) LONG    Apply 1 sensor every 10 days    BLOOD-GLUCOSE TRANSMITTER (DEXCOM G5 TRANSMITTER) LONG    Replace every 90 days    BUPROPION (WELLBUTRIN XL) 150 MG TB24 TABLET    Take 1 tablet (150 mg total) by mouth once daily.    BUPROPION (WELLBUTRIN XL) 150 MG TB24 TABLET    Take 1 tablet (150 mg total) by mouth once daily.    CARVEDILOL (COREG) 6.25 MG TABLET    Take 1 tablet (6.25 mg total) by mouth 2 (two) times daily.    CLOPIDOGREL (PLAVIX) 75 MG TABLET    Take 1 tablet (75 mg total) by mouth once daily.    DIVALPROEX (DEPAKOTE SPRINKLE) 125 MG CAPSULE    Take 2 capsules (250 mg total) by mouth every 8 (eight) hours.    FUROSEMIDE (LASIX) 40 MG TABLET    Take 1 tablet (40 mg total) by mouth once daily. Takes once daily in morning    GABAPENTIN (NEURONTIN) 300 MG CAPSULE    Take 2 capsules (600 mg total) by mouth 2 (two) times daily.    INSULIN ASPART U-100 (NOVOLOG FLEXPEN U-100  "INSULIN) 100 UNIT/ML (3 ML) INPN PEN    15 units with meals plus ss, or max daily dose of 100 units.    INSULIN DEGLUDEC (TRESIBA FLEXTOUCH U-200) 200 UNIT/ML (3 ML) INSULIN PEN    Inject 80 Units into the skin every evening.    INSULIN SYRINGE-NEEDLE U-100 (BD INSULIN SYRINGE ULTRA-FINE) 1 ML 31 GAUGE X 5/16 SYRG    Use to inject insulin 5 times daily    LANCETS (ACCU-CHEK SOFTCLIX LANCETS) MISC    Use to check blood glucose 4 (four) times daily before meals and nightly.    NITROGLYCERIN (NITROSTAT) 0.4 MG SL TABLET    Place 1 tablet (0.4 mg total) under the tongue every 5 (five) minutes as needed for Chest pain.    PEN NEEDLE, DIABETIC 31 GAUGE X 5/16" NDLE    Use to inject insulin 4 times daily    SACUBITRIL-VALSARTAN (ENTRESTO) 24-26 MG PER TABLET    Take 1 tablet by mouth 2 (two) times daily.   Changed and/or Refilled Medications    Modified Medication Previous Medication    MIRTAZAPINE (REMERON) 15 MG TABLET mirtazapine (REMERON) 15 MG tablet       Take 1 tablet (15 mg total) by mouth every evening.    Take 1 tablet (15 mg total) by mouth every evening.       I personally reviewed past medical, family and social history.       Answers submitted by the patient for this visit:  Abdominal Pain Questionnaire (Submitted on 10/10/2022)  Chief Complaint: Abdominal pain  Chronicity: recurrent  Onset: more than 1 month ago  Onset quality: sudden  Frequency: constantly  Episode duration: 6 Months  Progression since onset: rapidly worsening  Pain location: RLQ, epigastric region  Pain - numeric: 8/10  Pain quality: burning, sharp  anorexia: Yes  belching: No  flatus: No  hematochezia: No  melena: No  weight loss: No  Aggravated by: being still, certain positions, deep breathing, movement  Relieved by: nothing  Pain severity: severe  Treatments tried: acetaminophen  Improvement on treatment: no relief  abdominal surgery: Yes  colon cancer: No  Crohn's disease: No  gallstones: No  GERD: No  irritable bowel syndrome: " No  kidney stones: No  pancreatitis: No  PUD: No  ulcerative colitis: No  UTI: Yes

## 2022-10-10 NOTE — PROGRESS NOTES
Population Health Outreach.    Working [facility] report for [place] - chart reviewed at this time

## 2022-10-11 LAB
ESTIMATED AVG GLUCOSE: 203 MG/DL (ref 68–131)
HBA1C MFR BLD: 8.7 % (ref 4–5.6)
POTASSIUM SERPL-SCNC: 3.5 MMOL/L (ref 3.5–5.1)

## 2022-10-18 ENCOUNTER — PES CALL (OUTPATIENT)
Dept: ADMINISTRATIVE | Facility: CLINIC | Age: 47
End: 2022-10-18
Payer: MEDICARE

## 2022-10-19 ENCOUNTER — PATIENT MESSAGE (OUTPATIENT)
Dept: PSYCHIATRY | Facility: CLINIC | Age: 47
End: 2022-10-19
Payer: MEDICARE

## 2022-10-28 ENCOUNTER — OFFICE VISIT (OUTPATIENT)
Dept: GASTROENTEROLOGY | Facility: CLINIC | Age: 47
End: 2022-10-28
Payer: MEDICARE

## 2022-10-28 ENCOUNTER — PATIENT OUTREACH (OUTPATIENT)
Dept: ADMINISTRATIVE | Facility: HOSPITAL | Age: 47
End: 2022-10-28
Payer: MEDICARE

## 2022-10-28 VITALS — WEIGHT: 181 LBS | BODY MASS INDEX: 29.09 KG/M2 | HEIGHT: 66 IN

## 2022-10-28 DIAGNOSIS — R10.13 EPIGASTRIC PAIN: Primary | ICD-10-CM

## 2022-10-28 DIAGNOSIS — Z90.49 S/P CHOLECYSTECTOMY: ICD-10-CM

## 2022-10-28 DIAGNOSIS — K52.9 CHRONIC DIARRHEA: ICD-10-CM

## 2022-10-28 DIAGNOSIS — R10.11 RUQ ABDOMINAL PAIN: ICD-10-CM

## 2022-10-28 DIAGNOSIS — R19.8 IRREGULAR BOWEL HABITS: ICD-10-CM

## 2022-10-28 DIAGNOSIS — R11.2 NAUSEA AND VOMITING, UNSPECIFIED VOMITING TYPE: ICD-10-CM

## 2022-10-28 DIAGNOSIS — Z79.01 CURRENT USE OF ANTICOAGULANT THERAPY: ICD-10-CM

## 2022-10-28 PROCEDURE — 99213 OFFICE O/P EST LOW 20 MIN: CPT | Mod: PBBFAC,PO | Performed by: NURSE PRACTITIONER

## 2022-10-28 PROCEDURE — 99214 PR OFFICE/OUTPT VISIT, EST, LEVL IV, 30-39 MIN: ICD-10-PCS | Mod: S$PBB,,, | Performed by: NURSE PRACTITIONER

## 2022-10-28 PROCEDURE — 99999 PR PBB SHADOW E&M-EST. PATIENT-LVL III: ICD-10-PCS | Mod: PBBFAC,,, | Performed by: NURSE PRACTITIONER

## 2022-10-28 PROCEDURE — 99214 OFFICE O/P EST MOD 30 MIN: CPT | Mod: S$PBB,,, | Performed by: NURSE PRACTITIONER

## 2022-10-28 PROCEDURE — 99999 PR PBB SHADOW E&M-EST. PATIENT-LVL III: CPT | Mod: PBBFAC,,, | Performed by: NURSE PRACTITIONER

## 2022-10-28 RX ORDER — OMEPRAZOLE 40 MG/1
40 CAPSULE, DELAYED RELEASE ORAL DAILY
Qty: 30 CAPSULE | Refills: 2 | Status: SHIPPED | OUTPATIENT
Start: 2022-10-28 | End: 2023-01-30

## 2022-10-28 RX ORDER — ONDANSETRON 8 MG/1
8 TABLET, ORALLY DISINTEGRATING ORAL EVERY 12 HOURS PRN
Qty: 30 TABLET | Refills: 1 | Status: SHIPPED | OUTPATIENT
Start: 2022-10-28 | End: 2023-02-07 | Stop reason: SDUPTHER

## 2022-10-28 NOTE — PROGRESS NOTES
Subjective:       Patient ID: Samaria Verma is a 47 y.o. female, Body mass index is 29.21 kg/m².    Chief Complaint: Abdominal Pain and Nausea      Patient is new to me. Referred by Dr. Martinez for chronic abdominal pain.    Abdominal Pain  This is a chronic problem. The current episode started more than 1 month ago (Started end of 5/2022). The onset quality is gradual. The problem occurs constantly (fluctuates in severity). The problem has been unchanged. The pain is located in the RUQ and epigastric region. The pain is severe. The quality of the pain is sharp and aching. The abdominal pain radiates to the back. Associated symptoms include diarrhea (chronic problem; bowel movements are several times per day; describes stool as watery), nausea, vomiting (denies bright red blood or coffee ground emesis) and weight loss (eating less due to symptoms). Pertinent negatives include no anorexia, belching, constipation, dysuria, fever, flatus, frequency, hematochezia or melena. The pain is aggravated by palpation and eating. The pain is relieved by Nothing. She has tried nothing for the symptoms. Prior diagnostic workup includes GI consult, surgery, CT scan and ultrasound. Her past medical history is significant for abdominal surgery and gallstones (Hx of cholecystectomy on 6/8/22). There is no history of colon cancer, Crohn's disease, GERD, irritable bowel syndrome, pancreatitis, PUD or ulcerative colitis.   Review of Systems   Constitutional:  Positive for weight loss (eating less due to symptoms). Negative for appetite change, fever and unexpected weight change.   HENT:  Negative for trouble swallowing.    Respiratory:  Negative for cough and shortness of breath.    Cardiovascular:  Negative for chest pain.   Gastrointestinal:  Positive for abdominal pain, diarrhea (chronic problem; bowel movements are several times per day; describes stool as watery), nausea and vomiting (denies bright red blood or coffee ground emesis).  Negative for abdominal distention, anal bleeding, anorexia, blood in stool, constipation, flatus, hematochezia, melena and rectal pain.   Genitourinary:  Negative for difficulty urinating, dysuria and frequency.   Musculoskeletal:  Negative for gait problem.   Skin:  Negative for rash.   Neurological:  Negative for speech difficulty.   Psychiatric/Behavioral:  Negative for confusion.      Past Medical History:   Diagnosis Date    Anticoagulant long-term use     CHF (congestive heart failure)     Chronic systolic congestive heart failure 3/7/2017    Diabetes mellitus     Diabetes mellitus type 2 in obese 1/6/2016    Hypertension     Primary hypertension 1/6/2016      Past Surgical History:   Procedure Laterality Date    BREAST SURGERY Bilateral     reduction    CARDIAC DEFIBRILLATOR PLACEMENT  2018    CHOLECYSTECTOMY      INSERTION OF PACEMAKER  2018    LAPAROSCOPIC CHOLECYSTECTOMY N/A 06/08/2022    Procedure: CHOLECYSTECTOMY, LAPAROSCOPIC;  Surgeon: Sonido Kennedy MD;  Location: [facility];  Service: General;  Laterality: N/A;    TUBAL LIGATION        Family History   Problem Relation Age of Onset    Heart disease Mother     Arthritis Mother     Heart disease Father     Hypertension Father     Heart disease Paternal Grandmother     Asthma Son     Pulmonary embolism Other     Glaucoma Neg Hx     Macular degeneration Neg Hx     Retinal detachment Neg Hx     Colon cancer Neg Hx     Esophageal cancer Neg Hx     Stomach cancer Neg Hx       Wt Readings from Last 10 Encounters:   10/28/22 82.1 kg (181 lb)   10/10/22 83.9 kg (184 lb 15.5 oz)   07/31/22 83 kg (182 lb 15.7 oz)   07/28/22 84 kg (185 lb 3 oz)   07/25/22 85.3 kg (188 lb)   07/14/22 85.3 kg (188 lb 0.8 oz)   06/30/22 87.5 kg (193 lb 0.2 oz)   06/27/22 85.7 kg (188 lb 15 oz)   06/23/22 86.2 kg (190 lb)   06/17/22 86.4 kg (190 lb 7.6 oz)     Lab Results   Component Value Date    WBC 8.83 08/02/2022    HGB 12.2 08/02/2022    HCT 38.7 08/02/2022    MCV 84 08/02/2022      08/02/2022     CMP  Sodium   Date Value Ref Range Status   08/02/2022 141 136 - 145 mmol/L Final     Potassium   Date Value Ref Range Status   10/10/2022 3.5 3.5 - 5.1 mmol/L Final     Chloride   Date Value Ref Range Status   08/02/2022 100 95 - 110 mmol/L Final     CO2   Date Value Ref Range Status   08/02/2022 28 22 - 31 mmol/L Final     Glucose   Date Value Ref Range Status   08/02/2022 152 (H) 70 - 110 mg/dL Final     Comment:     The ADA recommends the following guidelines for fasting glucose:    Normal:       less than 100 mg/dL    Prediabetes:  100 mg/dL to 125 mg/dL    Diabetes:     126 mg/dL or higher       BUN   Date Value Ref Range Status   08/02/2022 10 7 - 18 mg/dL Final     Creatinine   Date Value Ref Range Status   08/02/2022 0.54 0.50 - 1.40 mg/dL Final     Calcium   Date Value Ref Range Status   08/02/2022 8.8 8.4 - 10.2 mg/dL Final     Total Protein   Date Value Ref Range Status   08/02/2022 7.2 6.0 - 8.4 g/dL Final     Albumin   Date Value Ref Range Status   08/02/2022 3.9 3.5 - 5.2 g/dL Final     Total Bilirubin   Date Value Ref Range Status   08/02/2022 0.4 0.2 - 1.3 mg/dL Final     Alkaline Phosphatase   Date Value Ref Range Status   08/02/2022 87 38 - 145 U/L Final     AST ([place])   Date Value Ref Range Status   01/06/2016 55 (H) 14 - 36 U/L Final     AST   Date Value Ref Range Status   08/02/2022 25 14 - 36 U/L Final     ALT   Date Value Ref Range Status   08/02/2022 22 0 - 35 U/L Final     Anion Gap   Date Value Ref Range Status   08/02/2022 13 8 - 16 mmol/L Final     eGFR if    Date Value Ref Range Status   07/31/2022 >60 >60 mL/min/1.73 m^2 Final     eGFR if non    Date Value Ref Range Status   07/31/2022 >60 >60 mL/min/1.73 m^2 Final     Comment:     Calculation used to obtain the estimated glomerular filtration  rate (eGFR) is the CKD-EPI equation.        Lab Results   Component Value Date    AMYLASE 70 01/06/2016     Lab Results    Component Value Date    LIPASE 149 01/06/2016     Lab Results   Component Value Date    LIPASERES 76 06/16/2022             Reviewed prior medical records including radiology report of US of abdomen 6/16/22 and CT of abdomen and pelvis 6/16/22 & endoscopy history (see surgical history).     Objective:      Physical Exam  Constitutional:       General: She is not in acute distress.     Appearance: She is well-developed.   HENT:      Head: Normocephalic.      Right Ear: Hearing normal.      Left Ear: Hearing normal.      Nose: Nose normal.      Mouth/Throat:      Mouth: No oral lesions.      Pharynx: Uvula midline.   Eyes:      General: Lids are normal.      Conjunctiva/sclera: Conjunctivae normal.      Pupils: Pupils are equal, round, and reactive to light.   Neck:      Trachea: Trachea normal.   Cardiovascular:      Rate and Rhythm: Regular rhythm.      Heart sounds: Normal heart sounds. No murmur heard.  Pulmonary:      Effort: Pulmonary effort is normal. No respiratory distress.      Breath sounds: Normal breath sounds. No stridor. No wheezing.   Abdominal:      General: Bowel sounds are normal. There is no distension.      Palpations: Abdomen is soft. There is no mass.      Tenderness: There is abdominal tenderness in the right upper quadrant and epigastric area. There is no guarding or rebound.      Comments: Well healed surgical scars noted   Musculoskeletal:         General: Normal range of motion.      Cervical back: Normal range of motion.   Skin:     General: Skin is warm and dry.      Findings: No rash.      Comments: Non jaundiced   Neurological:      Mental Status: She is alert and oriented to person, place, and time.   Psychiatric:         Speech: Speech normal.         Behavior: Behavior normal. Behavior is cooperative.         Assessment:       1. Epigastric pain    2. RUQ abdominal pain    3. Nausea and vomiting, unspecified vomiting type    4. Chronic diarrhea    5. Irregular bowel habits    6.  S/P cholecystectomy    7. Current use of anticoagulant therapy           Plan:   All diagnoses and orders for this visit:    Epigastric pain, RUQ abdominal pain & Nausea and vomiting, unspecified vomiting type        - Start: omeprazole (PRILOSEC) 40 MG capsule; Take 1 capsule (40 mg total) by mouth once daily.  Dispense: 30 capsule; Refill: 2  - Start: ondansetron (ZOFRAN-ODT) 8 MG TbDL; Take 1 tablet (8 mg total) by mouth every 12 (twelve) hours as needed (nausea).  Dispense: 30 tablet; Refill: 1  - Hepatic Function Panel; Future; Expected date: 10/28/2022  - Lipase; Future; Expected date: 10/28/2022  - Schedule EGD   - Take PPI in the morning 30 minutes before breakfast  - Recommend to avoid large meals, avoid eating within 3 hours of bedtime, elevate head of bed if nocturnal symptoms are present, smoking cessation (if current smoker), & weight loss (if overweight).   - Recommend minimize/avoid high-fat foods, chocolate, caffeine, citrus, alcohol, & tomato products.  - Advised to avoid/limit use of NSAID's, since they can cause GI upset, bleeding, and/or ulcers. If needed, take with food.      Chronic diarrhea & Irregular bowel habits  - TISSUE TRANSGLUTAMINASE (TTG), IGA; Future; Expected date: 10/28/2022  - IGA; Future; Expected date: 10/28/2022  - Stool Exam-Ova,Cysts,Parasites; Future; Expected date: 10/28/2022  - Giardia / Cryptosporidum, EIA; Future; Expected date: 10/28/2022  - Stool culture; Future; Expected date: 10/28/2022  - WBC, Stool; Future; Expected date: 10/28/2022  - Occult blood x 1, stool; Future; Expected date: 10/28/2022  - pH, stool; Future; Expected date: 10/28/2022  - Pancreatic elastase, fecal; Future; Expected date: 10/28/2022  - Clostridium difficile EIA; Future; Expected date: 10/28/2022  - Recommended increase fiber in diet, especially soluble fiber since this can help bulk up the stool consistency and may help to slow down how fast the stool goes through the colon and can prevent  diarrhea   - Schedule Colonoscopy     S/P cholecystectomy    Current use of anticoagulant therapy   - Informed patient that the anticoagulant(s) will likely need to be held for endoscopy, nurse will confirm with cardiologist/PCP.     If no improvement in symptoms or symptoms worsen, call/follow-up at clinic or go to ER

## 2022-11-04 ENCOUNTER — LAB VISIT (OUTPATIENT)
Dept: LAB | Facility: HOSPITAL | Age: 47
End: 2022-11-04
Payer: MEDICARE

## 2022-11-04 ENCOUNTER — PATIENT MESSAGE (OUTPATIENT)
Dept: PSYCHIATRY | Facility: CLINIC | Age: 47
End: 2022-11-04
Payer: MEDICARE

## 2022-11-04 DIAGNOSIS — R11.2 NAUSEA AND VOMITING, UNSPECIFIED VOMITING TYPE: ICD-10-CM

## 2022-11-04 DIAGNOSIS — K52.9 CHRONIC DIARRHEA: ICD-10-CM

## 2022-11-04 DIAGNOSIS — R10.13 EPIGASTRIC PAIN: ICD-10-CM

## 2022-11-04 DIAGNOSIS — R10.11 RUQ ABDOMINAL PAIN: ICD-10-CM

## 2022-11-04 LAB
ALBUMIN SERPL BCP-MCNC: 3.9 G/DL (ref 3.5–5.2)
ALP SERPL-CCNC: 105 U/L (ref 55–135)
ALT SERPL W/O P-5'-P-CCNC: 26 U/L (ref 10–44)
AST SERPL-CCNC: 23 U/L (ref 10–40)
BILIRUB DIRECT SERPL-MCNC: <0.1 MG/DL (ref 0.1–0.3)
BILIRUB SERPL-MCNC: 0.3 MG/DL (ref 0.1–1)
IGA SERPL-MCNC: 413 MG/DL (ref 40–350)
LIPASE SERPL-CCNC: 40 U/L (ref 4–60)
PROT SERPL-MCNC: 7.4 G/DL (ref 6–8.4)

## 2022-11-04 PROCEDURE — 83690 ASSAY OF LIPASE: CPT | Performed by: NURSE PRACTITIONER

## 2022-11-04 PROCEDURE — 86364 TISS TRNSGLTMNASE EA IG CLAS: CPT | Performed by: NURSE PRACTITIONER

## 2022-11-04 PROCEDURE — 80076 HEPATIC FUNCTION PANEL: CPT | Performed by: NURSE PRACTITIONER

## 2022-11-04 PROCEDURE — 82784 ASSAY IGA/IGD/IGG/IGM EACH: CPT | Performed by: NURSE PRACTITIONER

## 2022-11-04 PROCEDURE — 36415 COLL VENOUS BLD VENIPUNCTURE: CPT | Mod: PO | Performed by: NURSE PRACTITIONER

## 2022-11-08 LAB — TTG IGA SER-ACNC: 10 UNITS

## 2022-11-10 ENCOUNTER — PATIENT MESSAGE (OUTPATIENT)
Dept: FAMILY MEDICINE | Facility: CLINIC | Age: 47
End: 2022-11-10
Payer: MEDICARE

## 2022-11-11 RX ORDER — BUPROPION HYDROCHLORIDE 150 MG/1
150 TABLET ORAL DAILY
Qty: 90 TABLET | Refills: 1 | Status: SHIPPED | OUTPATIENT
Start: 2022-11-11 | End: 2023-03-24 | Stop reason: SDUPTHER

## 2022-11-11 NOTE — TELEPHONE ENCOUNTER
No new care gaps identified.  [facility] Embedded Care Gaps. Reference number: [id number]. 11/11/2022   8:29:16 AM CST

## 2022-11-14 ENCOUNTER — PATIENT MESSAGE (OUTPATIENT)
Dept: GASTROENTEROLOGY | Facility: CLINIC | Age: 47
End: 2022-11-14
Payer: MEDICARE

## 2022-11-15 ENCOUNTER — PATIENT MESSAGE (OUTPATIENT)
Dept: GASTROENTEROLOGY | Facility: CLINIC | Age: 47
End: 2022-11-15
Payer: MEDICARE

## 2022-11-15 DIAGNOSIS — R10.13 EPIGASTRIC PAIN: Primary | ICD-10-CM

## 2022-11-15 DIAGNOSIS — R10.11 RUQ ABDOMINAL PAIN: ICD-10-CM

## 2022-11-15 RX ORDER — DICYCLOMINE HYDROCHLORIDE 10 MG/1
10 CAPSULE ORAL
Qty: 120 CAPSULE | Refills: 0 | Status: SHIPPED | OUTPATIENT
Start: 2022-11-15 | End: 2022-12-28 | Stop reason: SDUPTHER

## 2022-11-15 NOTE — TELEPHONE ENCOUNTER
Let her know I sent a Rx for Bentyl to her pharmacy to try for the abdominal pain and continue Omeprazole. Notify office if symptoms do not improve.

## 2022-11-18 ENCOUNTER — CLINICAL SUPPORT (OUTPATIENT)
Dept: CARDIOLOGY | Facility: HOSPITAL | Age: 47
End: 2022-11-18
Payer: MEDICARE

## 2022-11-18 DIAGNOSIS — Z95.810 PRESENCE OF AUTOMATIC (IMPLANTABLE) CARDIAC DEFIBRILLATOR: ICD-10-CM

## 2022-11-22 ENCOUNTER — PATIENT MESSAGE (OUTPATIENT)
Dept: ADMINISTRATIVE | Facility: HOSPITAL | Age: 47
End: 2022-11-22
Payer: MEDICARE

## 2022-11-22 ENCOUNTER — PATIENT OUTREACH (OUTPATIENT)
Dept: ADMINISTRATIVE | Facility: HOSPITAL | Age: 47
End: 2022-11-22
Payer: MEDICARE

## 2022-12-05 ENCOUNTER — PATIENT MESSAGE (OUTPATIENT)
Dept: PSYCHIATRY | Facility: CLINIC | Age: 47
End: 2022-12-05
Payer: MEDICARE

## 2022-12-13 ENCOUNTER — PATIENT OUTREACH (OUTPATIENT)
Dept: ADMINISTRATIVE | Facility: HOSPITAL | Age: 47
End: 2022-12-13
Payer: MEDICARE

## 2022-12-13 ENCOUNTER — PATIENT MESSAGE (OUTPATIENT)
Dept: ADMINISTRATIVE | Facility: HOSPITAL | Age: 47
End: 2022-12-13
Payer: MEDICARE

## 2022-12-13 NOTE — LETTER
December 21, 2022    Samaria Verma  [address] [facility] [place] [address]             [facility]  [address]  [place] [address]  Phone: [phone] Dear Mrs. Verma:    We have tried to reach you by mychart unsuccessfully.    Your [facility] care is dedicated to helping you stay healthy with regular scheduled recommended screenings.  Scheduling routine screenings is important to maintaining good health. Our records indicate that you may be overdue for your screening pap smear.  Pap smear screening can help identify patients at risk for developing cervical cancer at an early stage when it is most likely to be successfully treated.     The current recommendation for a Pap smear screening is every 3-5 years.  We encourage you to schedule your appointment with your women's health provider.   Many women see a Gynecologist for this screening but some primary care providers also provide a Pap smear screening     If you recently had your Pap smear screening outside of [facility], please let your primary care team know so that they can update your health record.  Please send a message to your primary care physician via [email or web address] or contact his/her office at [phone].     If you have questions or want to schedule your screening, please call [phone] or send a message via [email or web address].        Sincerely,        Dylan Martinez DO and your [facility] Team          No

## 2022-12-28 DIAGNOSIS — R10.11 RUQ ABDOMINAL PAIN: ICD-10-CM

## 2022-12-28 DIAGNOSIS — M54.16 LUMBAR RADICULOPATHY: ICD-10-CM

## 2022-12-28 DIAGNOSIS — R10.13 EPIGASTRIC PAIN: ICD-10-CM

## 2022-12-29 RX ORDER — DICYCLOMINE HYDROCHLORIDE 10 MG/1
10 CAPSULE ORAL
Qty: 120 CAPSULE | Refills: 0 | Status: SHIPPED | OUTPATIENT
Start: 2022-12-29 | End: 2023-02-07 | Stop reason: SDUPTHER

## 2022-12-29 RX ORDER — GABAPENTIN 300 MG/1
600 CAPSULE ORAL 2 TIMES DAILY
Qty: 120 CAPSULE | Refills: 3 | Status: SHIPPED | OUTPATIENT
Start: 2022-12-29 | End: 2023-12-29

## 2023-01-06 ENCOUNTER — TELEPHONE (OUTPATIENT)
Dept: GASTROENTEROLOGY | Facility: CLINIC | Age: 48
End: 2023-01-06
Payer: MEDICARE

## 2023-01-06 NOTE — TELEPHONE ENCOUNTER
----- Message from Dejuan Baumann sent at 1/6/2023  2:19 PM CST -----  Type: Needs Medical Advice  Who Called:  Patient    Pharmacy name and phone #:    [facility] [address] [facility] [address] [place] [address]  Phone: [phone] Fax: [phone]      Best Call Back Number: [phone]  Additional Information: Patient states that her procedure is on 01/09/23 and she hasn't received her prep kit.    Please call STAT  .

## 2023-01-17 ENCOUNTER — PATIENT MESSAGE (OUTPATIENT)
Dept: ADMINISTRATIVE | Facility: HOSPITAL | Age: 48
End: 2023-01-17
Payer: MEDICARE

## 2023-01-18 DIAGNOSIS — E11.9 TYPE 2 DIABETES MELLITUS WITHOUT COMPLICATION: ICD-10-CM

## 2023-01-25 ENCOUNTER — PATIENT MESSAGE (OUTPATIENT)
Dept: ADMINISTRATIVE | Facility: HOSPITAL | Age: 48
End: 2023-01-25
Payer: MEDICARE

## 2023-02-07 ENCOUNTER — TELEPHONE (OUTPATIENT)
Dept: CARDIOLOGY | Facility: HOSPITAL | Age: 48
End: 2023-02-07
Payer: MEDICARE

## 2023-02-07 DIAGNOSIS — R10.13 EPIGASTRIC PAIN: ICD-10-CM

## 2023-02-07 DIAGNOSIS — R10.11 RUQ ABDOMINAL PAIN: ICD-10-CM

## 2023-02-07 DIAGNOSIS — R11.2 NAUSEA AND VOMITING, UNSPECIFIED VOMITING TYPE: ICD-10-CM

## 2023-02-07 RX ORDER — ONDANSETRON 8 MG/1
8 TABLET, ORALLY DISINTEGRATING ORAL EVERY 12 HOURS PRN
Qty: 30 TABLET | Refills: 1 | Status: SHIPPED | OUTPATIENT
Start: 2023-02-07 | End: 2023-09-26 | Stop reason: SDUPTHER

## 2023-02-07 RX ORDER — DICYCLOMINE HYDROCHLORIDE 10 MG/1
10 CAPSULE ORAL
Qty: 120 CAPSULE | Refills: 0 | Status: SHIPPED | OUTPATIENT
Start: 2023-02-07 | End: 2023-02-15

## 2023-02-15 DIAGNOSIS — R10.9 ABDOMINAL PAIN, UNSPECIFIED ABDOMINAL LOCATION: Primary | ICD-10-CM

## 2023-02-15 RX ORDER — DICYCLOMINE HYDROCHLORIDE 20 MG/1
10 TABLET ORAL 4 TIMES DAILY PRN
Qty: 60 TABLET | Refills: 1 | Status: SHIPPED | OUTPATIENT
Start: 2023-02-15 | End: 2023-04-16

## 2023-03-08 ENCOUNTER — PATIENT MESSAGE (OUTPATIENT)
Dept: ENDOCRINOLOGY | Facility: CLINIC | Age: 48
End: 2023-03-08

## 2023-03-08 ENCOUNTER — LAB VISIT (OUTPATIENT)
Dept: LAB | Facility: HOSPITAL | Age: 48
End: 2023-03-08
Payer: MEDICARE

## 2023-03-08 ENCOUNTER — OFFICE VISIT (OUTPATIENT)
Dept: ENDOCRINOLOGY | Facility: CLINIC | Age: 48
End: 2023-03-08
Payer: MEDICARE

## 2023-03-08 ENCOUNTER — PATIENT MESSAGE (OUTPATIENT)
Dept: ENDOCRINOLOGY | Facility: CLINIC | Age: 48
End: 2023-03-08
Payer: MEDICARE

## 2023-03-08 VITALS
WEIGHT: 175.81 LBS | SYSTOLIC BLOOD PRESSURE: 154 MMHG | HEART RATE: 92 BPM | HEIGHT: 66 IN | DIASTOLIC BLOOD PRESSURE: 80 MMHG | BODY MASS INDEX: 28.26 KG/M2 | OXYGEN SATURATION: 97 %

## 2023-03-08 DIAGNOSIS — Z79.4 TYPE 2 DIABETES MELLITUS WITH COMPLICATION, WITH LONG-TERM CURRENT USE OF INSULIN: Primary | ICD-10-CM

## 2023-03-08 DIAGNOSIS — I10 PRIMARY HYPERTENSION: ICD-10-CM

## 2023-03-08 DIAGNOSIS — E78.2 MIXED HYPERLIPIDEMIA: ICD-10-CM

## 2023-03-08 DIAGNOSIS — E11.8 TYPE 2 DIABETES MELLITUS WITH COMPLICATION, WITH LONG-TERM CURRENT USE OF INSULIN: Primary | ICD-10-CM

## 2023-03-08 DIAGNOSIS — R55 SYNCOPE AND COLLAPSE: ICD-10-CM

## 2023-03-08 DIAGNOSIS — F43.9 SITUATIONAL STRESS: ICD-10-CM

## 2023-03-08 DIAGNOSIS — Z79.4 TYPE 2 DIABETES MELLITUS WITH COMPLICATION, WITH LONG-TERM CURRENT USE OF INSULIN: ICD-10-CM

## 2023-03-08 DIAGNOSIS — E11.8 TYPE 2 DIABETES MELLITUS WITH COMPLICATION, WITH LONG-TERM CURRENT USE OF INSULIN: ICD-10-CM

## 2023-03-08 PROCEDURE — 36415 COLL VENOUS BLD VENIPUNCTURE: CPT | Mod: PO | Performed by: NURSE PRACTITIONER

## 2023-03-08 PROCEDURE — 80053 COMPREHEN METABOLIC PANEL: CPT | Performed by: NURSE PRACTITIONER

## 2023-03-08 PROCEDURE — 99214 PR OFFICE/OUTPT VISIT, EST, LEVL IV, 30-39 MIN: ICD-10-PCS | Mod: S$PBB,,, | Performed by: NURSE PRACTITIONER

## 2023-03-08 PROCEDURE — 99999 PR PBB SHADOW E&M-EST. PATIENT-LVL V: ICD-10-PCS | Mod: PBBFAC,,, | Performed by: NURSE PRACTITIONER

## 2023-03-08 PROCEDURE — 99999 PR PBB SHADOW E&M-EST. PATIENT-LVL V: CPT | Mod: PBBFAC,,, | Performed by: NURSE PRACTITIONER

## 2023-03-08 PROCEDURE — 99214 OFFICE O/P EST MOD 30 MIN: CPT | Mod: S$PBB,,, | Performed by: NURSE PRACTITIONER

## 2023-03-08 PROCEDURE — 99215 OFFICE O/P EST HI 40 MIN: CPT | Mod: PBBFAC,PO | Performed by: NURSE PRACTITIONER

## 2023-03-08 PROCEDURE — 83036 HEMOGLOBIN GLYCOSYLATED A1C: CPT | Performed by: NURSE PRACTITIONER

## 2023-03-08 PROCEDURE — 85007 BL SMEAR W/DIFF WBC COUNT: CPT | Performed by: NURSE PRACTITIONER

## 2023-03-08 PROCEDURE — 85027 COMPLETE CBC AUTOMATED: CPT | Performed by: NURSE PRACTITIONER

## 2023-03-08 RX ORDER — BLOOD-GLUCOSE TRANSMITTER
1 EACH MISCELLANEOUS CONTINUOUS
Qty: 1 EACH | Refills: 3 | Status: SHIPPED | OUTPATIENT
Start: 2023-03-08 | End: 2023-06-13 | Stop reason: SDUPTHER

## 2023-03-08 RX ORDER — BLOOD-GLUCOSE SENSOR
1 EACH MISCELLANEOUS
Qty: 3 EACH | Refills: 12 | Status: SHIPPED | OUTPATIENT
Start: 2023-03-08 | End: 2023-06-13 | Stop reason: SDUPTHER

## 2023-03-08 RX ORDER — INSULIN DEGLUDEC 200 U/ML
80 INJECTION, SOLUTION SUBCUTANEOUS NIGHTLY
Qty: 4 PEN | Refills: 12 | Status: SHIPPED | OUTPATIENT
Start: 2023-03-08 | End: 2023-06-13 | Stop reason: SDUPTHER

## 2023-03-08 RX ORDER — INSULIN ASPART 100 [IU]/ML
INJECTION, SOLUTION INTRAVENOUS; SUBCUTANEOUS
Qty: 30 ML | Refills: 6 | Status: SHIPPED | OUTPATIENT
Start: 2023-03-08 | End: 2023-06-13 | Stop reason: SDUPTHER

## 2023-03-08 NOTE — PATIENT INSTRUCTIONS
Start with 50 units of tresiba---not 80 as on rx     Start with 10 novolog with meals plus ss.       If not eating, can take SS part of novolog to get glucoses beat back down.     Novolog/o  USE ONLY    Dose is based on level of glucose before meals only (meaning no food or drink for 4 hours). This dose may be added to a standard meal dose if ordered    150-200=+2  201-250=+4  251-300=+6  301-350=+8  351-400=+10

## 2023-03-08 NOTE — PROGRESS NOTES
Subjective:       Patient ID: Samaria Verma is a 47 y.o. female.    Chief Complaint: No chief complaint on file.    HPI Pt is a 47 y.o. wf  with a diagnosis of Type 2 diabetes mellitus diagnosed approximately  March 2022 (feeling poorly, went to ER glucose >800), as well as chronic conditions pending review including NICM, chronic systolic HF, HTN, HLP.  Other pertinent medical and social information noted includes, but not limited to: Pt admitted to hospital in June with abdominal pain--cholecystectomy--with return for abdominal pain and concerns of bile leak--w/u negative.   Abdominal pain continued, and pt started c/o r leg numbness, weakness and further w/u revealed degenerative changes in lumbar spine along with some mild bulging discs.  However, A1C >14% negated steroid injections.     Interim Events:  march 8, 2023:  Pt  just put on hospice--he is 4 yrs s/p LVAD.  She is tearful.  Has not taken any of her own insulin or checked glucoses since likely November when he started to deteriorate.  However, she also realizes she needs to take care of self. She has also been having freq syncope type events for which she just passes out for no reason.  2x yesterday.     June 30, 2022L  Pt is currently taking 63 of lantus qsh, Novolog 15 units  Plus ss 4 times a day with no regard to if she is eating meals.    Sensor downloaded.  No hypoglycemia--indicating marked insulin resistance.     Review of Systems   Constitutional:  Positive for fatigue. Negative for activity change.   HENT:  Negative for hearing loss and trouble swallowing.    Eyes:  Positive for visual disturbance. Negative for photophobia.        Last Eye Exam:  May 2022    Respiratory:  Negative for cough and shortness of breath.    Cardiovascular:  Negative for chest pain and palpitations.   Gastrointestinal:  Negative for constipation and diarrhea.   Endocrine: Negative for polyuria.   Genitourinary:  Negative for urgency.   Musculoskeletal:  Positive  "for back pain. Negative for arthralgias and myalgias.   Integumentary:  Negative for rash and wound.   Neurological:  Positive for syncope and numbness. Negative for weakness.   Psychiatric/Behavioral:  Negative for sleep disturbance. The patient is not nervous/anxious.        Objective:      Physical Exam  Vitals reviewed.   Constitutional:       Appearance: Normal appearance. She is obese. She is ill-appearing.   HENT:      Head: Normocephalic and atraumatic.      Nose: Nose normal.      Mouth/Throat:      Mouth: Mucous membranes are moist.      Pharynx: Oropharynx is clear.   Eyes:      Extraocular Movements: Extraocular movements intact.      Conjunctiva/sclera: Conjunctivae normal.      Pupils: Pupils are equal, round, and reactive to light.   Cardiovascular:      Rate and Rhythm: Normal rate and regular rhythm.      Pulses: Normal pulses.      Heart sounds: Normal heart sounds.   Pulmonary:      Effort: Pulmonary effort is normal.      Breath sounds: Normal breath sounds.   Musculoskeletal:         General: Normal range of motion.      Cervical back: Normal range of motion and neck supple.      Right lower leg: No edema.      Left lower leg: No edema.      Comments: Deferred Feet: no open  Wounds or calluses.   Pedal pulses +2 bilaterally  Sensation via monofilament intact 5/5 bilaterally   Skin:     General: Skin is warm and dry.      Comments: Turgor good.    Neurological:      General: No focal deficit present.      Mental Status: She is alert and oriented to person, place, and time.   Psychiatric:         Mood and Affect: Mood normal.         Behavior: Behavior normal.         Thought Content: Thought content normal.         Judgment: Judgment normal.         BP (!) 154/80 (BP Location: Right arm, Patient Position: Sitting, BP Method: Medium (Manual))   Pulse 92   Ht 5' 6" (1.676 m)   Wt 79.8 kg (175 lb 13.1 oz)   LMP  (LMP Unknown)   SpO2 97%   BMI 28.38 kg/m²     Hemoglobin A1C   Date Value Ref " Range Status   10/10/2022 8.7 (H) 4.0 - 5.6 % Final     Comment:     ADA Screening Guidelines:  5.7-6.4%  Consistent with prediabetes  >or=6.5%  Consistent with diabetes    High levels of fetal hemoglobin interfere with the HbA1C  assay. Heterozygous hemoglobin variants (HbS, HgC, etc)do  not significantly interfere with this assay.   However, presence of multiple variants may affect accuracy.     07/31/2022 8.7 (H) 0.0 - 5.6 % Final     Comment:     Reference Interval:  5.0 - 5.6 Normal   5.7 - 6.4 High Risk   > 6.5 Diabetic      Hgb A1c results are standardized based on the (NGSP) National   Glycohemoglobin Standardization Program.      Hemoglobin A1C levels are related to mean serum/plasma glucose   during the preceding 2-3 months.        06/27/2022 9.5 (H) 4.0 - 5.6 % Final     Comment:     ADA Screening Guidelines:  5.7-6.4%  Consistent with prediabetes  >or=6.5%  Consistent with diabetes    High levels of fetal hemoglobin interfere with the HbA1C  assay. Heterozygous hemoglobin variants (HbS, HgC, etc)do  not significantly interfere with this assay.   However, presence of multiple variants may affect accuracy.         Chemistry        Component Value Date/Time     08/02/2022 0704    K 3.5 10/10/2022 1408     08/02/2022 0704    CO2 28 08/02/2022 0704    BUN 10 08/02/2022 0704    CREATININE 0.54 08/02/2022 0704     (H) 08/02/2022 0704        Component Value Date/Time    CALCIUM 8.8 08/02/2022 0704    ALKPHOS 105 11/04/2022 1325    AST 23 11/04/2022 1325    AST 55 (H) 01/06/2016 1710    ALT 26 11/04/2022 1325    BILITOT 0.3 11/04/2022 1325    ESTGFRAFRICA >60 07/31/2022 2045    EGFRNONAA >60 07/31/2022 2045          Lab Results   Component Value Date    CHOL 148 08/01/2022     Lab Results   Component Value Date    HDL 39 (L) 08/01/2022     Lab Results   Component Value Date    LDLCALC 46.6 (L) 08/01/2022     Lab Results   Component Value Date    TRIG 312 (H) 08/01/2022     Lab Results    Component Value Date    CHOLHDL 26.4 08/01/2022     Lab Results   Component Value Date    MICALBCREAT 115.4 (H) 03/28/2022     Lab Results   Component Value Date    TSH 1.530 07/31/2022     No results found for: [id number]    Assessment:      1. Type 2 diabetes mellitus with complication, with long-term current use of insulin  insulin degludec (TRESIBA FLEXTOUCH U-200) 200 unit/mL (3 mL) insulin pen    insulin aspart U-100 (NOVOLOG FLEXPEN U-100 INSULIN) 100 unit/mL (3 mL) InPn pen    blood-glucose transmitter (DEXCOM G6 TRANSMITTER) Bhumika    blood-glucose sensor (DEXCOM G6 SENSOR) Bhumika    Comprehensive Metabolic Panel    Hemoglobin A1C    CBC Auto Differential      2. Primary hypertension        3. Mixed hyperlipidemia        4. Situational stress        5. Syncope and collapse            Plan:    Restart Tresiba---at 50 units----was 80 before  Restart novolog 10 plus ss----was 15.   Reinforced at least getting Tresiba in, I realized this is a stressful period.   Increase water intake.     Regarding syncope--has cardiac hx---needs to f/u them.   Could be dehydration but symptoms not consistent.   ? stress reaction--  Will try and improve glucose status.     ORDERS 03/08/2023   Virtual visit in 2 weeks---see if Dexcom in cloud--if not--send pt code to share. Day before visit.

## 2023-03-09 LAB
ALBUMIN SERPL BCP-MCNC: 4.1 G/DL (ref 3.5–5.2)
ALP SERPL-CCNC: 128 U/L (ref 55–135)
ALT SERPL W/O P-5'-P-CCNC: 36 U/L (ref 10–44)
ANION GAP SERPL CALC-SCNC: 12 MMOL/L (ref 8–16)
ANISOCYTOSIS BLD QL SMEAR: SLIGHT
AST SERPL-CCNC: 28 U/L (ref 10–40)
BASOPHILS # BLD AUTO: ABNORMAL K/UL (ref 0–0.2)
BASOPHILS NFR BLD: 0 % (ref 0–1.9)
BILIRUB SERPL-MCNC: 0.3 MG/DL (ref 0.1–1)
BUN SERPL-MCNC: 13 MG/DL (ref 6–20)
CALCIUM SERPL-MCNC: 10.7 MG/DL (ref 8.7–10.5)
CHLORIDE SERPL-SCNC: 95 MMOL/L (ref 95–110)
CO2 SERPL-SCNC: 27 MMOL/L (ref 23–29)
CREAT SERPL-MCNC: 0.9 MG/DL (ref 0.5–1.4)
DACRYOCYTES BLD QL SMEAR: ABNORMAL
DIFFERENTIAL METHOD: ABNORMAL
EOSINOPHIL # BLD AUTO: ABNORMAL K/UL (ref 0–0.5)
EOSINOPHIL NFR BLD: 3 % (ref 0–8)
ERYTHROCYTE [DISTWIDTH] IN BLOOD BY AUTOMATED COUNT: 14.2 % (ref 11.5–14.5)
EST. GFR  (NO RACE VARIABLE): >60 ML/MIN/1.73 M^2
ESTIMATED AVG GLUCOSE: 312 MG/DL (ref 68–131)
GIANT PLATELETS BLD QL SMEAR: PRESENT
GLUCOSE SERPL-MCNC: 345 MG/DL (ref 70–110)
HBA1C MFR BLD: 12.5 % (ref 4–5.6)
HCT VFR BLD AUTO: 45.6 % (ref 37–48.5)
HGB BLD-MCNC: 14 G/DL (ref 12–16)
HYPOCHROMIA BLD QL SMEAR: ABNORMAL
IMM GRANULOCYTES # BLD AUTO: ABNORMAL K/UL (ref 0–0.04)
IMM GRANULOCYTES NFR BLD AUTO: ABNORMAL % (ref 0–0.5)
LYMPHOCYTES # BLD AUTO: ABNORMAL K/UL (ref 1–4.8)
LYMPHOCYTES NFR BLD: 41 % (ref 18–48)
MCH RBC QN AUTO: 25.6 PG (ref 27–31)
MCHC RBC AUTO-ENTMCNC: 30.7 G/DL (ref 32–36)
MCV RBC AUTO: 84 FL (ref 82–98)
MONOCYTES # BLD AUTO: ABNORMAL K/UL (ref 0.3–1)
MONOCYTES NFR BLD: 4 % (ref 4–15)
NEUTROPHILS NFR BLD: 49 % (ref 38–73)
NEUTS BAND NFR BLD MANUAL: 3 %
NRBC BLD-RTO: 0 /100 WBC
OVALOCYTES BLD QL SMEAR: ABNORMAL
PLATELET # BLD AUTO: 339 K/UL (ref 150–450)
PLATELET BLD QL SMEAR: ABNORMAL
PMV BLD AUTO: 10.8 FL (ref 9.2–12.9)
POIKILOCYTOSIS BLD QL SMEAR: SLIGHT
POLYCHROMASIA BLD QL SMEAR: ABNORMAL
POTASSIUM SERPL-SCNC: 4.1 MMOL/L (ref 3.5–5.1)
PROT SERPL-MCNC: 8.4 G/DL (ref 6–8.4)
RBC # BLD AUTO: 5.46 M/UL (ref 4–5.4)
SODIUM SERPL-SCNC: 134 MMOL/L (ref 136–145)
SPHEROCYTES BLD QL SMEAR: ABNORMAL
TOXIC GRANULES BLD QL SMEAR: PRESENT
WBC # BLD AUTO: 11.42 K/UL (ref 3.9–12.7)
WBC TOXIC VACUOLES BLD QL SMEAR: PRESENT

## 2023-03-10 ENCOUNTER — PATIENT MESSAGE (OUTPATIENT)
Dept: ENDOCRINOLOGY | Facility: CLINIC | Age: 48
End: 2023-03-10
Payer: MEDICARE

## 2023-03-14 ENCOUNTER — TELEPHONE (OUTPATIENT)
Dept: CARDIOLOGY | Facility: HOSPITAL | Age: 48
End: 2023-03-14
Payer: MEDICARE

## 2023-03-15 ENCOUNTER — PATIENT OUTREACH (OUTPATIENT)
Dept: ADMINISTRATIVE | Facility: HOSPITAL | Age: 48
End: 2023-03-15
Payer: MEDICARE

## 2023-03-15 ENCOUNTER — PATIENT MESSAGE (OUTPATIENT)
Dept: ADMINISTRATIVE | Facility: HOSPITAL | Age: 48
End: 2023-03-15
Payer: MEDICARE

## 2023-03-23 ENCOUNTER — PATIENT MESSAGE (OUTPATIENT)
Dept: ENDOCRINOLOGY | Facility: CLINIC | Age: 48
End: 2023-03-23
Payer: MEDICARE

## 2023-03-24 DIAGNOSIS — G47.00 INSOMNIA, UNSPECIFIED TYPE: ICD-10-CM

## 2023-03-24 RX ORDER — MIRTAZAPINE 15 MG/1
15 TABLET, FILM COATED ORAL NIGHTLY
Qty: 90 TABLET | Refills: 1 | Status: SHIPPED | OUTPATIENT
Start: 2023-03-24 | End: 2024-03-23

## 2023-03-25 NOTE — TELEPHONE ENCOUNTER
No new care gaps identified.  [facility] Embedded Care Gaps. Reference number: [id number]. 3/24/2023   9:07:41 PM CDT

## 2023-03-25 NOTE — TELEPHONE ENCOUNTER
Refill Routing Note   Medication(s) are not appropriate for processing by [facility] for the following reason(s):      Required vitals abnormal    [facility] action(s):  Defer  Approve              Appointments  past 12m or future 3m with PCP    Date Provider   Last Visit   10/10/2022 Dylan Martinez, DO   Next Visit   Visit date not found Dylan Martinez, DO   ED visits in past 90 days: 0        Note composed:10:09 PM 03/24/2023

## 2023-03-26 RX ORDER — BUPROPION HYDROCHLORIDE 150 MG/1
150 TABLET ORAL DAILY
Qty: 90 TABLET | Refills: 1 | Status: SHIPPED | OUTPATIENT
Start: 2023-03-26 | End: 2024-03-25

## 2023-03-28 ENCOUNTER — TELEPHONE (OUTPATIENT)
Dept: CARDIOLOGY | Facility: HOSPITAL | Age: 48
End: 2023-03-28
Payer: MEDICARE

## 2023-03-28 ENCOUNTER — PATIENT MESSAGE (OUTPATIENT)
Dept: CARDIOLOGY | Facility: HOSPITAL | Age: 48
End: 2023-03-28
Payer: MEDICARE

## 2023-03-31 ENCOUNTER — PATIENT OUTREACH (OUTPATIENT)
Dept: ADMINISTRATIVE | Facility: HOSPITAL | Age: 48
End: 2023-03-31
Payer: MEDICARE

## 2023-04-25 ENCOUNTER — TELEPHONE (OUTPATIENT)
Dept: CARDIOLOGY | Facility: HOSPITAL | Age: 48
End: 2023-04-25
Payer: MEDICARE

## 2023-05-02 ENCOUNTER — TELEPHONE (OUTPATIENT)
Dept: CARDIOLOGY | Facility: HOSPITAL | Age: 48
End: 2023-05-02
Payer: MEDICAID

## 2023-05-23 ENCOUNTER — TELEPHONE (OUTPATIENT)
Dept: CARDIOLOGY | Facility: HOSPITAL | Age: 48
End: 2023-05-23
Payer: MEDICAID

## 2023-05-23 NOTE — TELEPHONE ENCOUNTER
Call placed to patient in regards to home monitor and need to bring to appt this afternoon  Left  requesting return call back  MyChart message sent

## 2023-06-07 DIAGNOSIS — E11.9 TYPE 2 DIABETES MELLITUS WITHOUT COMPLICATION: ICD-10-CM

## 2023-06-12 ENCOUNTER — PATIENT MESSAGE (OUTPATIENT)
Dept: ENDOCRINOLOGY | Facility: CLINIC | Age: 48
End: 2023-06-12
Payer: MEDICAID

## 2023-06-12 DIAGNOSIS — Z79.4 TYPE 2 DIABETES MELLITUS WITH COMPLICATION, WITH LONG-TERM CURRENT USE OF INSULIN: ICD-10-CM

## 2023-06-12 DIAGNOSIS — E11.8 TYPE 2 DIABETES MELLITUS WITH COMPLICATION, WITH LONG-TERM CURRENT USE OF INSULIN: ICD-10-CM

## 2023-06-13 RX ORDER — INSULIN ASPART 100 [IU]/ML
INJECTION, SOLUTION INTRAVENOUS; SUBCUTANEOUS
Qty: 30 ML | Refills: 6 | Status: SHIPPED | OUTPATIENT
Start: 2023-06-13

## 2023-06-13 RX ORDER — BLOOD-GLUCOSE SENSOR
1 EACH MISCELLANEOUS
Qty: 3 EACH | Refills: 12 | Status: SHIPPED | OUTPATIENT
Start: 2023-06-13 | End: 2024-06-12

## 2023-06-13 RX ORDER — INSULIN DEGLUDEC 200 U/ML
80 INJECTION, SOLUTION SUBCUTANEOUS NIGHTLY
Qty: 4 PEN | Refills: 12 | Status: SHIPPED | OUTPATIENT
Start: 2023-06-13 | End: 2023-07-19 | Stop reason: SDUPTHER

## 2023-06-13 RX ORDER — BLOOD-GLUCOSE TRANSMITTER
1 EACH MISCELLANEOUS CONTINUOUS
Qty: 1 EACH | Refills: 3 | Status: SHIPPED | OUTPATIENT
Start: 2023-06-13 | End: 2024-06-12

## 2023-06-21 ENCOUNTER — TELEPHONE (OUTPATIENT)
Dept: CARDIOLOGY | Facility: HOSPITAL | Age: 48
End: 2023-06-21
Payer: MEDICAID

## 2023-06-21 NOTE — TELEPHONE ENCOUNTER
Call placed to patient in regards to home monitor and need for clinic check  Patient states she is following with Dr. Regan  Notified we would contact clinic and transfer home monitor

## 2023-06-22 ENCOUNTER — PATIENT MESSAGE (OUTPATIENT)
Dept: ADMINISTRATIVE | Facility: OTHER | Age: 48
End: 2023-06-22
Payer: MEDICAID

## 2023-06-23 ENCOUNTER — TELEPHONE (OUTPATIENT)
Dept: CARDIOLOGY | Facility: HOSPITAL | Age: 48
End: 2023-06-23
Payer: MEDICAID

## 2023-06-23 NOTE — TELEPHONE ENCOUNTER
LM on Dr. Regan's nurse's VM ([phone]) re: need to transfer pt in [place] to their office.      BIO ICD    Model:  Iperia 7 HF-T  SN: [id number]  PID: [id number]

## 2023-06-27 RX ORDER — INSULIN GLARGINE 300 U/ML
INJECTION, SOLUTION SUBCUTANEOUS
Qty: 4 PEN | Refills: 11 | Status: SHIPPED | OUTPATIENT
Start: 2023-06-27

## 2023-07-03 ENCOUNTER — TELEPHONE (OUTPATIENT)
Dept: CARDIOLOGY | Facility: HOSPITAL | Age: 48
End: 2023-07-03
Payer: MEDICAID

## 2023-07-03 NOTE — TELEPHONE ENCOUNTER
LM on Dr. Regan's nurse's VM ([phone] ext [phone]) re: need to transfer pt in [place] to their office.       BIO ICD     Model:  Iperia 7 HF-T  SN: [id number]  PID: [id number]

## 2023-07-11 ENCOUNTER — TELEPHONE (OUTPATIENT)
Dept: CARDIOLOGY | Facility: HOSPITAL | Age: 48
End: 2023-07-11
Payer: MEDICAID

## 2023-07-11 NOTE — TELEPHONE ENCOUNTER
at [place] took message for Dr Regan's nurse to call back re: transferring pt's HM to their clinic.  They are at [facility] today

## 2023-07-12 ENCOUNTER — TELEPHONE (OUTPATIENT)
Dept: CARDIOLOGY | Facility: HOSPITAL | Age: 48
End: 2023-07-12
Payer: MEDICAID

## 2023-07-19 DIAGNOSIS — Z79.4 TYPE 2 DIABETES MELLITUS WITH COMPLICATION, WITH LONG-TERM CURRENT USE OF INSULIN: ICD-10-CM

## 2023-07-19 DIAGNOSIS — E11.8 TYPE 2 DIABETES MELLITUS WITH COMPLICATION, WITH LONG-TERM CURRENT USE OF INSULIN: ICD-10-CM

## 2023-07-20 RX ORDER — INSULIN DEGLUDEC 200 U/ML
80 INJECTION, SOLUTION SUBCUTANEOUS NIGHTLY
Qty: 4 PEN | Refills: 12 | Status: SHIPPED | OUTPATIENT
Start: 2023-07-20

## 2023-09-06 ENCOUNTER — PATIENT MESSAGE (OUTPATIENT)
Dept: ADMINISTRATIVE | Facility: HOSPITAL | Age: 48
End: 2023-09-06
Payer: MEDICAID

## 2023-09-26 DIAGNOSIS — R11.2 NAUSEA AND VOMITING, UNSPECIFIED VOMITING TYPE: ICD-10-CM

## 2023-09-26 RX ORDER — ONDANSETRON 8 MG/1
8 TABLET, ORALLY DISINTEGRATING ORAL EVERY 12 HOURS PRN
Qty: 30 TABLET | Refills: 1 | Status: SHIPPED | OUTPATIENT
Start: 2023-09-26

## 2023-10-04 RX ORDER — CARVEDILOL 6.25 MG/1
6.25 TABLET ORAL 2 TIMES DAILY
Qty: 60 TABLET | Refills: 11 | Status: SHIPPED | OUTPATIENT
Start: 2023-10-04 | End: 2024-10-03

## 2024-02-27 DIAGNOSIS — Z00.00 ENCOUNTER FOR MEDICARE ANNUAL WELLNESS EXAM: ICD-10-CM

## 2024-03-27 DIAGNOSIS — E11.40: ICD-10-CM

## 2024-03-27 DIAGNOSIS — E11.9 TYPE 2 DIABETES MELLITUS WITHOUT COMPLICATION: ICD-10-CM

## 2024-04-03 DIAGNOSIS — E11.9 TYPE 2 DIABETES MELLITUS WITHOUT COMPLICATION: ICD-10-CM

## 2024-04-11 ENCOUNTER — PATIENT MESSAGE (OUTPATIENT)
Dept: ADMINISTRATIVE | Facility: HOSPITAL | Age: 49
End: 2024-04-11
Payer: MEDICAID

## 2025-01-29 DIAGNOSIS — Z79.4 TYPE 2 DIABETES MELLITUS WITH COMPLICATION, WITH LONG-TERM CURRENT USE OF INSULIN: ICD-10-CM

## 2025-01-29 DIAGNOSIS — E11.8 TYPE 2 DIABETES MELLITUS WITH COMPLICATION, WITH LONG-TERM CURRENT USE OF INSULIN: ICD-10-CM

## 2025-01-30 RX ORDER — INSULIN ASPART 100 [IU]/ML
INJECTION, SOLUTION INTRAVENOUS; SUBCUTANEOUS
Qty: 30 ML | Refills: 6 | Status: SHIPPED | OUTPATIENT
Start: 2025-01-30

## 2025-03-13 ENCOUNTER — TELEPHONE (OUTPATIENT)
Dept: GASTROENTEROLOGY | Facility: CLINIC | Age: 50
End: 2025-03-13
Payer: MEDICARE

## 2025-03-13 NOTE — TELEPHONE ENCOUNTER
Returned call to the patient, patient requested to schedule a hospital f/u for abd pain and abnormal Ct scan per patient, patient offered first available with Ethel Herman which was 05/20/2025 and patient declined asking for a sooner available appointment with any provider and found 03/20/2025 with Gosia Vaca NP and patient accepted that appointment.

## 2025-03-13 NOTE — TELEPHONE ENCOUNTER
----- Message from RT Pippa sent at 3/13/2025  8:04 AM CDT -----  Regarding: [facility] ER Visit f/u 3/12/2025  Please call patient to schedule appointment for further evaluation/treatment for enteritis.Pippa Coles, [facility] Nav[facility]/Case Management [phone]

## 2025-03-13 NOTE — TELEPHONE ENCOUNTER
Attempted to reach the patient [facility] f/u, left message asking for a call back to the clinic, clinic number provided.

## 2025-03-13 NOTE — TELEPHONE ENCOUNTER
----- Message from Karin sent at 3/13/2025  9:23 AM CDT -----  Type:  Patient Returning CallWho Called:pt Who Left Message for Patient:rolando Does the patient know what this is regarding?:appt Would the patient rather a call back or a response via [facility]? Call back Best Call Back Number:[phone] Additional Information:   Please call back to advise. Thank you.

## 2025-03-18 ENCOUNTER — OFFICE VISIT (OUTPATIENT)
Dept: GASTROENTEROLOGY | Facility: CLINIC | Age: 50
End: 2025-03-18
Payer: MEDICARE

## 2025-03-18 VITALS — HEIGHT: 65 IN | WEIGHT: 172.19 LBS | BODY MASS INDEX: 28.69 KG/M2 | RESPIRATION RATE: 19 BRPM

## 2025-03-18 DIAGNOSIS — R11.0 NAUSEA: ICD-10-CM

## 2025-03-18 DIAGNOSIS — R93.5 ABNORMAL CT OF THE ABDOMEN: Primary | ICD-10-CM

## 2025-03-18 DIAGNOSIS — Z90.49 S/P CHOLECYSTECTOMY: ICD-10-CM

## 2025-03-18 DIAGNOSIS — R07.9 CHEST PAIN IN ADULT: ICD-10-CM

## 2025-03-18 DIAGNOSIS — K63.9 THICKENED SMALL BOWEL: ICD-10-CM

## 2025-03-18 DIAGNOSIS — R06.02 SHORTNESS OF BREATH: ICD-10-CM

## 2025-03-18 DIAGNOSIS — K52.9 CHRONIC DIARRHEA: ICD-10-CM

## 2025-03-18 DIAGNOSIS — Z79.01 CURRENT USE OF ANTICOAGULANT THERAPY: ICD-10-CM

## 2025-03-18 DIAGNOSIS — R10.32 LLQ ABDOMINAL PAIN: ICD-10-CM

## 2025-03-18 DIAGNOSIS — I50.22 CHRONIC SYSTOLIC CONGESTIVE HEART FAILURE: Chronic | ICD-10-CM

## 2025-03-18 DIAGNOSIS — K62.5 RECTAL BLEEDING: ICD-10-CM

## 2025-03-18 PROCEDURE — 3008F BODY MASS INDEX DOCD: CPT | Mod: CPTII,S$GLB,, | Performed by: NURSE PRACTITIONER

## 2025-03-18 PROCEDURE — 99214 OFFICE O/P EST MOD 30 MIN: CPT | Mod: S$GLB,,, | Performed by: NURSE PRACTITIONER

## 2025-03-18 PROCEDURE — 99999 PR PBB SHADOW E&M-EST. PATIENT-LVL IV: CPT | Mod: PBBFAC,,, | Performed by: NURSE PRACTITIONER

## 2025-03-18 PROCEDURE — 1159F MED LIST DOCD IN RCRD: CPT | Mod: CPTII,S$GLB,, | Performed by: NURSE PRACTITIONER

## 2025-03-18 PROCEDURE — 1160F RVW MEDS BY RX/DR IN RCRD: CPT | Mod: CPTII,S$GLB,, | Performed by: NURSE PRACTITIONER

## 2025-03-18 NOTE — PROGRESS NOTES
Subjective:       Patient ID: Samaria Verma is a 49 y.o. female, Body mass index is 28.65 kg/m².    Chief Complaint: Abdominal Pain      Established patient of Dr. Cárdenas & myself.    Abdominal Pain  This is a new problem. The current episode started 1 to 4 weeks ago. The onset quality is gradual. The problem occurs constantly. The problem has been gradually worsening. The pain is located in the LLQ. The pain is at a severity of 9/10. The pain is severe. The quality of the pain is aching. The abdominal pain radiates to the left flank. Associated symptoms include anorexia, diarrhea (bowel movements are 5-10 times per day; describes stool as type 7 on bristol scale; associated with fecal incontinence), hematochezia (reports spots of bright red blood on tissue paper and in stool after bowel movements) and nausea (Zofran ineffective). Pertinent negatives include no belching, constipation, dysuria, fever, flatus, frequency, melena, vomiting or weight loss. The pain is aggravated by eating, movement and palpation. The pain is relieved by Nothing. She has tried antibiotics (Past: Bentyl 10 mg TID; Currently: Augmentin BID) for the symptoms. The treatment provided no relief. Prior diagnostic workup includes CT scan and GI consult (ED visit). Her past medical history is significant for abdominal surgery, gallstones (Hx of cholecystectomy) and GERD. There is no history of colon cancer, Crohn's disease, irritable bowel syndrome, pancreatitis, PUD or ulcerative colitis.     Review of Systems   Constitutional:  Positive for appetite change, chills and diaphoresis. Negative for fever, unexpected weight change and weight loss.   HENT:  Negative for trouble swallowing.    Respiratory:  Positive for shortness of breath. Negative for cough.    Cardiovascular:  Positive for chest pain.   Gastrointestinal:  Positive for abdominal pain, anal bleeding, anorexia, blood in stool, diarrhea (bowel movements are 5-10 times per day; describes  stool as type 7 on bristol scale; associated with fecal incontinence), hematochezia (reports spots of bright red blood on tissue paper and in stool after bowel movements) and nausea (Zofran ineffective). Negative for abdominal distention, constipation, flatus, melena, rectal pain and vomiting.   Genitourinary:  Negative for difficulty urinating, dysuria and frequency.   Musculoskeletal:  Negative for gait problem.   Skin:  Negative for rash.   Neurological:  Positive for weakness. Negative for speech difficulty.   Psychiatric/Behavioral:  Negative for confusion.        Past Medical History:   Diagnosis Date    Anticoagulant long-term use     Anxiety     CHF (congestive heart failure)     Chronic systolic congestive heart failure 03/07/2017    Coronary artery disease     Depression     Diabetes mellitus     Diabetes mellitus type 2 in obese 01/06/2016    GERD (gastroesophageal reflux disease)     Hyperlipidemia     Hypertension     Primary hypertension 01/06/2016      Past Surgical History:   Procedure Laterality Date    BREAST SURGERY Bilateral     reduction    CARDIAC DEFIBRILLATOR PLACEMENT  2018    CHOLECYSTECTOMY      COLONOSCOPY N/A 01/09/2023    Procedure: COLONOSCOPY;  Surgeon: aStnam Cárdenas MD;  Location: [facility] ENDO;  Service: Endoscopy;  Laterality: N/A;    ESOPHAGOGASTRODUODENOSCOPY N/A 01/09/2023    Procedure: EGD (ESOPHAGOGASTRODUODENOSCOPY);  Surgeon: Satnam Cárdenas MD;  Location: [facility] ENDO;  Service: Endoscopy;  Laterality: N/A;    INSERTION OF PACEMAKER  2018    LAPAROSCOPIC CHOLECYSTECTOMY N/A 06/08/2022    Procedure: CHOLECYSTECTOMY, LAPAROSCOPIC;  Surgeon: Sonido Kennedy MD;  Location: [facility];  Service: General;  Laterality: N/A;    STENT, CORONARY, MULTI VESSEL      x 4    TUBAL LIGATION        Family History   Problem Relation Name Age of Onset    Heart disease Mother Nathaly     Arthritis Mother Nathaly     Lupus Mother Nahtaly     Heart disease Father Marquis     Hypertension Father Marquis      Asthma Son Jacobo     Heart disease Paternal Grandmother Cecilia     Pulmonary embolism Other      Glaucoma Neg Hx      Macular degeneration Neg Hx      Retinal detachment Neg Hx      Colon cancer Neg Hx      Esophageal cancer Neg Hx      Stomach cancer Neg Hx        Wt Readings from Last 10 Encounters:   03/18/25 78.1 kg (172 lb 2.9 oz)   03/12/25 76.9 kg (169 lb 8.5 oz)   03/12/25 77.2 kg (170 lb 1.6 oz)   03/08/23 79.8 kg (175 lb 13.1 oz)   01/09/23 80.6 kg (177 lb 11.1 oz)   10/28/22 82.1 kg (181 lb)   10/10/22 83.9 kg (184 lb 15.5 oz)   07/31/22 83 kg (182 lb 15.7 oz)   07/28/22 84 kg (185 lb 3 oz)   07/25/22 85.3 kg (188 lb)     Lab Results   Component Value Date    WBC 11.42 03/08/2023    HGB 14.0 03/08/2023    HCT 45.6 03/08/2023    MCV 84 03/08/2023     03/08/2023     CMP  Sodium   Date Value Ref Range Status   11/15/2024 139 136 - 145 mmol/L Final   03/08/2023 134 (L) 136 - 145 mmol/L Final     Potassium   Date Value Ref Range Status   11/15/2024 3.2 (L) 3.5 - 5.1 mmol/L Final     Comment:     Specimen is Slightly Hemolyzed, Assay Result may be  Affected   03/08/2023 4.1 3.5 - 5.1 mmol/L Final     Chloride   Date Value Ref Range Status   03/08/2023 95 95 - 110 mmol/L Final     CO2   Date Value Ref Range Status   03/08/2023 27 23 - 29 mmol/L Final     Carbon Dioxide   Date Value Ref Range Status   11/15/2024 27 21 - 32 mmol/L Final     Glucose   Date Value Ref Range Status   03/08/2023 345 (H) 70 - 110 mg/dL Final     BUN   Date Value Ref Range Status   11/15/2024 15.0 7.0 - 18.0 mg/dL Final   03/08/2023 13 6 - 20 mg/dL Final     Creatinine   Date Value Ref Range Status   11/15/2024 0.77 0.51 - 0.95 mg/dL Final   03/08/2023 0.9 0.5 - 1.4 mg/dL Final     Calcium   Date Value Ref Range Status   11/15/2024 9.2 8.5 - 10.1 mg/dL Final   03/08/2023 10.7 (H) 8.7 - 10.5 mg/dL Final     Total Protein   Date Value Ref Range Status   03/08/2023 8.4 6.0 - 8.4 g/dL Final     Albumin   Date Value Ref Range Status    11/15/2024 3.6 3.4 - 5.0 g/dL Final   03/08/2023 4.1 3.5 - 5.2 g/dL Final     Total Bilirubin   Date Value Ref Range Status   11/15/2024 0.3 0.2 - 1.3 mg/dL Final   03/08/2023 0.3 0.1 - 1.0 mg/dL Final     Comment:     For infants and newborns, interpretation of results should be based  on gestational age, weight and in agreement with clinical  observations.    Premature Infant recommended reference ranges:  Up to 24 hours.............<8.0 mg/dL  Up to 48 hours............<12.0 mg/dL  3-5 days..................<15.0 mg/dL  6-29 days.................<15.0 mg/dL       Alkaline Phosphatase   Date Value Ref Range Status   03/08/2023 128 55 - 135 U/L Final     AST ([place])   Date Value Ref Range Status   01/06/2016 55 (H) 14 - 36 U/L Final     AST   Date Value Ref Range Status   11/15/2024 15 15 - 37 U/L Final   03/08/2023 28 10 - 40 U/L Final     ALT   Date Value Ref Range Status   11/15/2024 20 13 - 61 U/L Final   03/08/2023 36 10 - 44 U/L Final     Anion Gap   Date Value Ref Range Status   03/08/2023 12 8 - 16 mmol/L Final     eGFR if    Date Value Ref Range Status   07/31/2022 >60 >60 mL/min/1.73 m^2 Final     eGFR if non    Date Value Ref Range Status   07/31/2022 >60 >60 mL/min/1.73 m^2 Final     Comment:     Calculation used to obtain the estimated glomerular filtration  rate (eGFR) is the CKD-EPI equation.        Lab Results   Component Value Date    AMYLASE 70 01/06/2016     Lab Results   Component Value Date    LIPASE 40 11/04/2022     Lab Results   Component Value Date    LIPASERES 76 06/16/2022             Reviewed prior medical records including radiology report of CT of abdomen and pelvis 3/12/25 and X-Ray of abdomen 3/12/25 & endoscopy history (see surgical history).     Objective:      Physical Exam  Constitutional:       General: She is not in acute distress.     Appearance: She is well-developed.   HENT:      Head: Normocephalic.      Right Ear: Hearing normal.       Left Ear: Hearing normal.      Nose: Nose normal.      Mouth/Throat:      Mouth: No oral lesions.      Pharynx: Uvula midline.   Eyes:      General: Lids are normal.      Conjunctiva/sclera: Conjunctivae normal.      Pupils: Pupils are equal, round, and reactive to light.   Neck:      Trachea: Trachea normal.   Cardiovascular:      Rate and Rhythm: Normal rate and regular rhythm.      Heart sounds: Normal heart sounds. No murmur heard.  Pulmonary:      Effort: Pulmonary effort is normal. No respiratory distress.      Breath sounds: Normal breath sounds. No stridor. No wheezing.   Abdominal:      General: Bowel sounds are normal. There is no distension.      Palpations: Abdomen is soft. There is no mass.      Tenderness: There is generalized abdominal tenderness. There is guarding. There is no rebound.   Musculoskeletal:         General: Normal range of motion.      Cervical back: Normal range of motion.   Skin:     General: Skin is warm and dry.      Findings: No rash.      Comments: Non jaundiced   Neurological:      Mental Status: She is alert and oriented to person, place, and time.   Psychiatric:         Speech: Speech normal.         Behavior: Behavior normal. Behavior is cooperative.           Assessment:       1. Abnormal CT of the abdomen    2. Thickened small bowel    3. LLQ abdominal pain    4. Chronic diarrhea    5. Rectal bleeding    6. Nausea    7. Chronic systolic congestive heart failure    8. Shortness of breath    9. Chest pain in adult    10. S/P cholecystectomy    11. Current use of anticoagulant therapy           Plan:   All diagnoses and orders for this visit:    Abnormal CT of the abdomen    Thickened small bowel    LLQ abdominal pain    Chronic diarrhea    Rectal bleeding    Nausea    Chronic systolic congestive heart failure    Shortness of breath    Chest pain in adult    S/P cholecystectomy    Current use of anticoagulant therapy    Instructed patient to go to ER for further  evaluation, patient verbalized understanding.  Offered patient ambulance but patient declined.    If no improvement in symptoms or symptoms worsen, call/follow-up at clinic or go to ER